# Patient Record
Sex: MALE | URBAN - METROPOLITAN AREA
[De-identification: names, ages, dates, MRNs, and addresses within clinical notes are randomized per-mention and may not be internally consistent; named-entity substitution may affect disease eponyms.]

---

## 2020-04-10 ENCOUNTER — EMERGENCY (EMERGENCY)
Facility: HOSPITAL | Age: 70
LOS: 1 days | Discharge: HOME | End: 2020-04-10
Attending: EMERGENCY MEDICINE
Payer: MEDICARE

## 2020-04-10 VITALS
TEMPERATURE: 100 F | HEART RATE: 115 BPM | OXYGEN SATURATION: 100 % | RESPIRATION RATE: 18 BRPM | SYSTOLIC BLOOD PRESSURE: 134 MMHG | DIASTOLIC BLOOD PRESSURE: 89 MMHG

## 2020-04-10 VITALS
DIASTOLIC BLOOD PRESSURE: 82 MMHG | HEART RATE: 81 BPM | RESPIRATION RATE: 18 BRPM | SYSTOLIC BLOOD PRESSURE: 122 MMHG | OXYGEN SATURATION: 99 % | WEIGHT: 201.94 LBS | HEIGHT: 69 IN | TEMPERATURE: 98 F

## 2020-04-10 LAB
ALBUMIN SERPL ELPH-MCNC: 4.2 G/DL — SIGNIFICANT CHANGE UP (ref 3.3–5)
ALP SERPL-CCNC: 119 U/L — SIGNIFICANT CHANGE UP (ref 40–120)
ALT FLD-CCNC: 70 U/L — HIGH (ref 10–45)
ANION GAP SERPL CALC-SCNC: 17 MMOL/L — SIGNIFICANT CHANGE UP (ref 5–17)
APPEARANCE UR: CLEAR — SIGNIFICANT CHANGE UP
APTT BLD: 35.1 SEC — SIGNIFICANT CHANGE UP (ref 27.5–36.3)
AST SERPL-CCNC: 78 U/L — HIGH (ref 10–40)
BACTERIA # UR AUTO: NEGATIVE — SIGNIFICANT CHANGE UP
BASOPHILS # BLD AUTO: 0.05 K/UL — SIGNIFICANT CHANGE UP (ref 0–0.2)
BASOPHILS NFR BLD AUTO: 0.3 % — SIGNIFICANT CHANGE UP (ref 0–2)
BILIRUB SERPL-MCNC: 0.6 MG/DL — SIGNIFICANT CHANGE UP (ref 0.2–1.2)
BILIRUB UR-MCNC: NEGATIVE — SIGNIFICANT CHANGE UP
BUN SERPL-MCNC: 36 MG/DL — HIGH (ref 7–23)
CALCIUM SERPL-MCNC: 9.4 MG/DL — SIGNIFICANT CHANGE UP (ref 8.4–10.5)
CHLORIDE SERPL-SCNC: 95 MMOL/L — LOW (ref 96–108)
CO2 SERPL-SCNC: 24 MMOL/L — SIGNIFICANT CHANGE UP (ref 22–31)
COLOR SPEC: SIGNIFICANT CHANGE UP
CREAT SERPL-MCNC: 1.5 MG/DL — HIGH (ref 0.5–1.3)
DIFF PNL FLD: ABNORMAL
EOSINOPHIL # BLD AUTO: 0.02 K/UL — SIGNIFICANT CHANGE UP (ref 0–0.5)
EOSINOPHIL NFR BLD AUTO: 0.1 % — SIGNIFICANT CHANGE UP (ref 0–6)
EPI CELLS # UR: 0 /HPF — SIGNIFICANT CHANGE UP
GLUCOSE SERPL-MCNC: 112 MG/DL — HIGH (ref 70–99)
GLUCOSE UR QL: NEGATIVE — SIGNIFICANT CHANGE UP
HCT VFR BLD CALC: 44.7 % — SIGNIFICANT CHANGE UP (ref 39–50)
HGB BLD-MCNC: 15.1 G/DL — SIGNIFICANT CHANGE UP (ref 13–17)
HYALINE CASTS # UR AUTO: 2 /LPF — SIGNIFICANT CHANGE UP (ref 0–2)
IMM GRANULOCYTES NFR BLD AUTO: 0.5 % — SIGNIFICANT CHANGE UP (ref 0–1.5)
INR BLD: 1.18 RATIO — HIGH (ref 0.88–1.16)
KETONES UR-MCNC: SIGNIFICANT CHANGE UP
LEUKOCYTE ESTERASE UR-ACNC: NEGATIVE — SIGNIFICANT CHANGE UP
LYMPHOCYTES # BLD AUTO: 1.13 K/UL — SIGNIFICANT CHANGE UP (ref 1–3.3)
LYMPHOCYTES # BLD AUTO: 5.7 % — LOW (ref 13–44)
MCHC RBC-ENTMCNC: 29 PG — SIGNIFICANT CHANGE UP (ref 27–34)
MCHC RBC-ENTMCNC: 33.8 GM/DL — SIGNIFICANT CHANGE UP (ref 32–36)
MCV RBC AUTO: 86 FL — SIGNIFICANT CHANGE UP (ref 80–100)
MONOCYTES # BLD AUTO: 0.98 K/UL — HIGH (ref 0–0.9)
MONOCYTES NFR BLD AUTO: 5 % — SIGNIFICANT CHANGE UP (ref 2–14)
NEUTROPHILS # BLD AUTO: 17.52 K/UL — HIGH (ref 1.8–7.4)
NEUTROPHILS NFR BLD AUTO: 88.4 % — HIGH (ref 43–77)
NITRITE UR-MCNC: NEGATIVE — SIGNIFICANT CHANGE UP
NRBC # BLD: 0 /100 WBCS — SIGNIFICANT CHANGE UP (ref 0–0)
PH UR: 6 — SIGNIFICANT CHANGE UP (ref 5–8)
PLATELET # BLD AUTO: 552 K/UL — HIGH (ref 150–400)
POTASSIUM SERPL-MCNC: 3.2 MMOL/L — LOW (ref 3.5–5.3)
POTASSIUM SERPL-SCNC: 3.2 MMOL/L — LOW (ref 3.5–5.3)
PROT SERPL-MCNC: 7.6 G/DL — SIGNIFICANT CHANGE UP (ref 6–8.3)
PROT UR-MCNC: ABNORMAL
PROTHROM AB SERPL-ACNC: 13.6 SEC — HIGH (ref 10–12.9)
RBC # BLD: 5.2 M/UL — SIGNIFICANT CHANGE UP (ref 4.2–5.8)
RBC # FLD: 13.5 % — SIGNIFICANT CHANGE UP (ref 10.3–14.5)
RBC CASTS # UR COMP ASSIST: 4 /HPF — SIGNIFICANT CHANGE UP (ref 0–4)
SODIUM SERPL-SCNC: 136 MMOL/L — SIGNIFICANT CHANGE UP (ref 135–145)
SP GR SPEC: 1.02 — SIGNIFICANT CHANGE UP (ref 1.01–1.02)
UROBILINOGEN FLD QL: NEGATIVE — SIGNIFICANT CHANGE UP
WBC # BLD: 19.79 K/UL — HIGH (ref 3.8–10.5)
WBC # FLD AUTO: 19.79 K/UL — HIGH (ref 3.8–10.5)
WBC UR QL: 1 /HPF — SIGNIFICANT CHANGE UP (ref 0–5)

## 2020-04-10 PROCEDURE — 93971 EXTREMITY STUDY: CPT | Mod: 26,RT

## 2020-04-10 PROCEDURE — 85730 THROMBOPLASTIN TIME PARTIAL: CPT

## 2020-04-10 PROCEDURE — 80053 COMPREHEN METABOLIC PANEL: CPT

## 2020-04-10 PROCEDURE — 73630 X-RAY EXAM OF FOOT: CPT

## 2020-04-10 PROCEDURE — 71045 X-RAY EXAM CHEST 1 VIEW: CPT

## 2020-04-10 PROCEDURE — 71045 X-RAY EXAM CHEST 1 VIEW: CPT | Mod: 26

## 2020-04-10 PROCEDURE — 81001 URINALYSIS AUTO W/SCOPE: CPT

## 2020-04-10 PROCEDURE — 85027 COMPLETE CBC AUTOMATED: CPT

## 2020-04-10 PROCEDURE — 85610 PROTHROMBIN TIME: CPT

## 2020-04-10 PROCEDURE — 73630 X-RAY EXAM OF FOOT: CPT | Mod: 26,RT

## 2020-04-10 PROCEDURE — 93971 EXTREMITY STUDY: CPT

## 2020-04-10 PROCEDURE — 99283 EMERGENCY DEPT VISIT LOW MDM: CPT | Mod: 25

## 2020-04-10 PROCEDURE — 99284 EMERGENCY DEPT VISIT MOD MDM: CPT

## 2020-04-10 RX ORDER — SODIUM CHLORIDE 9 MG/ML
500 INJECTION INTRAMUSCULAR; INTRAVENOUS; SUBCUTANEOUS ONCE
Refills: 0 | Status: COMPLETED | OUTPATIENT
Start: 2020-04-10 | End: 2020-04-10

## 2020-04-10 RX ORDER — POTASSIUM CHLORIDE 20 MEQ
40 PACKET (EA) ORAL ONCE
Refills: 0 | Status: COMPLETED | OUTPATIENT
Start: 2020-04-10 | End: 2020-04-10

## 2020-04-10 RX ORDER — ACETAMINOPHEN 500 MG
650 TABLET ORAL ONCE
Refills: 0 | Status: COMPLETED | OUTPATIENT
Start: 2020-04-10 | End: 2020-04-10

## 2020-04-10 RX ADMIN — SODIUM CHLORIDE 500 MILLILITER(S): 9 INJECTION INTRAMUSCULAR; INTRAVENOUS; SUBCUTANEOUS at 20:41

## 2020-04-10 RX ADMIN — Medication 40 MILLIEQUIVALENT(S): at 20:41

## 2020-04-10 RX ADMIN — Medication 650 MILLIGRAM(S): at 22:13

## 2020-04-10 NOTE — ED ADULT NURSE NOTE - OBJECTIVE STATEMENT
69y M w/ PMHx  HTN p/w R foot numbness that started this morning after walking down the stairs. Endorses he had severe pain in both feet so he called EMS, who said he had nml vitals. Endorses he still had pain, so pt went to  and had D-dimer elevated to 10, and found blood clots in leg. Currently only has numbness in the located in R toes. Denies pain at this time. Pt has full ROM in all extremities and able to bear weight on the affected foot.

## 2020-04-10 NOTE — ED PROVIDER NOTE - OBJECTIVE STATEMENT
69y M w/ PMHx of costochondritises (Diclofenac) and HTN p/w R foot numbness that started this morning after walking down the stairs. Endorses he had severe pain in both feet so he called EMS, who said he had nml vitals. Endorses he still had pain, so pt went to  and had D-dimer elevated to 10, and found blood clots in leg. Currently only has numbness in the located in R toes. Pt had CP a week ago and was Rx Diclofenac, although the CP has since resolved. Normally does not have any issues walking and does not have numbness in any other part of the body. Denies any injury or fall, CP, SOB. Has not had any previous clots or recent surgery. No cough, fever, N/V/D, back pain and no Hx of immobilization.     ID: [id number] 69y M PMHx HTN p/w R foot numbness that started this morning after walking down the stairs. Endorses he had severe pain in both feet so he called EMS, who said he had nml vitals. Endorses he still had pain, so pt went to  and had D-dimer elevated to 10, and was told to come to ED to eval for DVT. Currently only has numbness in the located in R toes. Pt had CP a week ago and was Rx Diclofenac, although the CP has since resolved. Normally does not have any issues walking and does not have numbness in any other part of the body. Denies any injury or fall, CP, SOB. Has not had any previous clots or recent surgery. No cough, fever, N/V/D, back pain and no Hx of immobilization.     ID: [id number]

## 2020-04-10 NOTE — ED ADULT NURSE NOTE - CHPI ED NUR SYMPTOMS NEG
right foot/no abrasion/no bruising/no difficulty bearing weight/no tingling/no fever/no deformity/no stiffness/no weakness

## 2020-04-10 NOTE — ED PROVIDER NOTE - CLINICAL SUMMARY MEDICAL DECISION MAKING FREE TEXT BOX
Leeroy Lazaro MD. 68 yo M presents for R leg numbness/pain since this AM. pt went to  and found to have elevated d-dimer and thus sent to ED here. pt neurologically intact except for subjective decreased sensation to his distal right toes vs left. low suspicion for acute neurologic pathology. will obtain labs, US to eval for DVT, and xray of foot. will reassess. Leeroy Lazaro MD. 70 yo M presents for R leg numbness/pain since this AM. pt went to  and found to have elevated d-dimer and thus sent to ED here. pt neurologically intact except for subjective decreased sensation to his distal right toes vs left. low suspicion for acute neurologic pathology. will obtain labs, US to eval for DVT, and xray of foot. will reassess.    ARGELIA Mcdaniel MD: Pt is a 70 y/o male with a PMH of costochondritis and HTN who p/w c/o R toe numbness (1-3) that started this morning after walking down the stairs. Endorses he had severe pain in both feet so he called EMS, who said he had nml vitals. Endorses he still had pain, so pt went to  and had D-dimer elevated (reportedly to 10), and found blood clots in leg. Currently only has numbness in the located in R toes. Pt had CP a week ago and was Rx Diclofenac, although the CP has since resolved. Normally does not have any issues walking and does not have numbness in any other part of the body. Denies any injury or fall, CP, SOB. Has not had any previous clots or recent surgery. No cough, fever, N/V/D, back pain and no Hx of immobilization. Leeroy Lazaro MD. 70 yo M presents for R leg numbness/pain since this AM. pt went to  and found to have elevated d-dimer and thus sent to ED here. pt neurologically intact except for subjective decreased sensation to his distal right toes vs left. low suspicion for acute neurologic pathology. will obtain labs, US to eval for DVT, and xray of foot. will reassess.    ARGELIA Mcdaniel MD: Pt is a 68 y/o male with a PMH of costochondritis and HTN who p/w c/o R toe numbness (1-3) that started this morning after walking down the stairs. Endorses he had severe pain in both feet so he called EMS, who evaluated him, performed VS, FS and felt pt did not need to go to ED. PT still had pain, so pt went to  who performed D-dimer which was elevated (reportedly to 10) and sent pt to ED for w/u for possible blood clot. Pt denies leg/calf pain/swelling. Pt denies: chest pain, SOB, cough, fevers, chills, pleuritic chest pain, abdominal pain, n/v/d, back pain, neck pain, HA, neck stiffness, focal numbness or weakness, visual changes, dizziness, lightheadedness, leg pain/swelling, recent travel, recent trauma, recent immobilization, dysuria, hematuria, rash. Pt treated last week with nsaids for costocondritis. Exam: A & O x 3, NAD, HEENT WNL and no facial asymmetry; lungs CTAB, cor +S1/S2, RRR, no murmurs; abdomen soft NTND; extremities with no edema; skin with no rashes, neuro exam non focal with no motor or sensory deficits. DP pulses intact to feet b/l, feet warm b/l, no cyanosis, cap refill <2 sec. Ddx includes, however, is not limited to: msk d/o, neuropathy. Less likely DVT given that pt has paresthesias/pain to the toes of his R foot with no inciting factors or risk factors for DVT. Plan: basic labs, LE doppler, reassess for possible outpt f/u

## 2020-04-10 NOTE — ED PROVIDER NOTE - PROGRESS NOTE DETAILS
Leeroy Lazaro MD. explained blood work and xrays and US findings to patient. advised to f/u with PMD regarding elevated WBC. strict return precautions provided. all questions answered. pt to be discharged. ARGELIA Mcdaniel MD: Pt appeared to have elevated WBC, Cr 1.5, elevated LFTs and we have no previous labs to compare to. Given elevated WBC, CXR and u/a performed without obvious abnormality or signs of infection. Here pt with low-grade temperature, also noted to be tachycardic. Tylenol given. Suspect viral syndrome or covid infection. Will continue to follow. No obvious signs/sx of serious bacterial infection at this time and pt appears to feel well and want to go home. Pt will require outpt f/u with PCP with strict return precautions.

## 2020-04-10 NOTE — ED PROVIDER NOTE - PHYSICAL EXAMINATION
GEN: NAD, awake, eyes open spontaneously  HEENT: NCAT, MMM, Trachea midline, normal conjunctiva, perrl  CHEST/LUNGS: Non-tachypneic, CTAB, bilateral breath sounds  CARDIAC: Non-tachycardic, normal perfusion, palpable DP/PT pulses bilat  ABDOMEN: Soft, NTND, No rebound/guarding  MSK: No edema, no gross deformity of extremities  SKIN: No rashes, no petechiae, no vesicles  NEURO: Subjective decrease sensation to distal aspect of R toes vs L, o/w motor funtion nml  PSYCH: Alert, appropriate, cooperative, with capacity and insight GEN: NAD, awake, eyes open spontaneously  HEENT: NCAT, MMM, Trachea midline, normal conjunctiva, perrl  CHEST/LUNGS: Non-tachypneic, CTAB, bilateral breath sounds  CARDIAC: Non-tachycardic, normal perfusion, palpable DP/PT pulses bilat  ABDOMEN: Soft, NTND, No rebound/guarding  MSK: No edema, no gross deformity of extremities; no tenerness to calves  SKIN: No rashes, no petechiae, no vesicles  NEURO: Subjective decrease sensation to distal aspect of R toes vs L, b/l motor funtion nml  PSYCH: Alert, appropriate, cooperative, with capacity and insight

## 2020-04-10 NOTE — ED PROVIDER NOTE - ATTENDING CONTRIBUTION TO CARE
I saw and evaluated patient with resident. I discussed H+P and MDM with resident. I agree with the statements made by the resident unless otherwise noted.    The care of this patient was in support of the [facility] countermeasures to Covid-19.

## 2020-04-10 NOTE — ED PROVIDER NOTE - NSFOLLOWUPINSTRUCTIONS_ED_ALL_ED_FT
Por favor tome acetaminofén sin receta médica. Use según las indicaciones y mamadou las advertencias de medicamentos.  Por favor tome Ibuprofeno sin receta médica. Use según las indicaciones y mamadou las advertencias de medicamentos.  Alejo un seguimiento con guevara médico de atención primaria dentro de 2-3 días con respecto a guevara recuento elevado de glóbulos blancos.  Por favor traiga tanya copia de juan resultados con usted.  Regrese al departamento de emergencias para empeorar juan síntomas.

## 2020-06-08 PROBLEM — I10 ESSENTIAL (PRIMARY) HYPERTENSION: Chronic | Status: ACTIVE | Noted: 2020-04-10

## 2020-06-08 PROBLEM — M94.0 CHONDROCOSTAL JUNCTION SYNDROME [TIETZE]: Chronic | Status: ACTIVE | Noted: 2020-04-10

## 2020-06-17 ENCOUNTER — INPATIENT (INPATIENT)
Facility: HOSPITAL | Age: 70
LOS: 4 days | Discharge: HOME | DRG: 246 | End: 2020-06-22
Attending: INTERNAL MEDICINE | Admitting: INTERNAL MEDICINE
Payer: MEDICARE

## 2020-06-17 ENCOUNTER — APPOINTMENT (OUTPATIENT)
Dept: HEART AND VASCULAR | Facility: CLINIC | Age: 70
End: 2020-06-17
Payer: MEDICARE

## 2020-06-17 VITALS
BODY MASS INDEX: 29.52 KG/M2 | DIASTOLIC BLOOD PRESSURE: 86 MMHG | HEART RATE: 95 BPM | SYSTOLIC BLOOD PRESSURE: 126 MMHG | TEMPERATURE: 98.1 F | WEIGHT: 197 LBS | OXYGEN SATURATION: 97 % | HEIGHT: 68.5 IN | RESPIRATION RATE: 14 BRPM

## 2020-06-17 VITALS
WEIGHT: 197.98 LBS | HEART RATE: 95 BPM | TEMPERATURE: 98 F | RESPIRATION RATE: 20 BRPM | HEIGHT: 69 IN | SYSTOLIC BLOOD PRESSURE: 130 MMHG | OXYGEN SATURATION: 98 % | DIASTOLIC BLOOD PRESSURE: 84 MMHG

## 2020-06-17 DIAGNOSIS — I21.4 NON-ST ELEVATION (NSTEMI) MYOCARDIAL INFARCTION: ICD-10-CM

## 2020-06-17 DIAGNOSIS — Z82.49 FAMILY HISTORY OF ISCHEMIC HEART DISEASE AND OTHER DISEASES OF THE CIRCULATORY SYSTEM: ICD-10-CM

## 2020-06-17 DIAGNOSIS — I10 ESSENTIAL (PRIMARY) HYPERTENSION: ICD-10-CM

## 2020-06-17 DIAGNOSIS — I50.9 HEART FAILURE, UNSPECIFIED: ICD-10-CM

## 2020-06-17 DIAGNOSIS — D72.829 ELEVATED WHITE BLOOD CELL COUNT, UNSPECIFIED: ICD-10-CM

## 2020-06-17 DIAGNOSIS — I51.3 INTRACARDIAC THROMBOSIS, NOT ELSEWHERE CLASSIFIED: ICD-10-CM

## 2020-06-17 LAB
ALBUMIN SERPL ELPH-MCNC: 4.1 G/DL — SIGNIFICANT CHANGE UP (ref 3.3–5)
ALP SERPL-CCNC: 67 U/L — SIGNIFICANT CHANGE UP (ref 40–120)
ALT FLD-CCNC: 24 U/L — SIGNIFICANT CHANGE UP (ref 10–45)
ANION GAP SERPL CALC-SCNC: 15 MMOL/L — SIGNIFICANT CHANGE UP (ref 5–17)
APTT BLD: 32.1 SEC — SIGNIFICANT CHANGE UP (ref 27.5–36.3)
AST SERPL-CCNC: 20 U/L — SIGNIFICANT CHANGE UP (ref 10–40)
BASOPHILS # BLD AUTO: 0.1 K/UL — SIGNIFICANT CHANGE UP (ref 0–0.2)
BASOPHILS NFR BLD AUTO: 0.6 % — SIGNIFICANT CHANGE UP (ref 0–2)
BILIRUB SERPL-MCNC: 0.3 MG/DL — SIGNIFICANT CHANGE UP (ref 0.2–1.2)
BUN SERPL-MCNC: 28 MG/DL — HIGH (ref 7–23)
CALCIUM SERPL-MCNC: 9.3 MG/DL — SIGNIFICANT CHANGE UP (ref 8.4–10.5)
CHLORIDE SERPL-SCNC: 102 MMOL/L — SIGNIFICANT CHANGE UP (ref 96–108)
CK MB CFR SERPL CALC: 5.9 NG/ML — SIGNIFICANT CHANGE UP (ref 0–6.7)
CK SERPL-CCNC: 101 U/L — SIGNIFICANT CHANGE UP (ref 30–200)
CO2 SERPL-SCNC: 24 MMOL/L — SIGNIFICANT CHANGE UP (ref 22–31)
CREAT SERPL-MCNC: 1.27 MG/DL — SIGNIFICANT CHANGE UP (ref 0.5–1.3)
EOSINOPHIL # BLD AUTO: 0.35 K/UL — SIGNIFICANT CHANGE UP (ref 0–0.5)
EOSINOPHIL NFR BLD AUTO: 2.3 % — SIGNIFICANT CHANGE UP (ref 0–6)
GLUCOSE SERPL-MCNC: 86 MG/DL — SIGNIFICANT CHANGE UP (ref 70–99)
HCT VFR BLD CALC: 44 % — SIGNIFICANT CHANGE UP (ref 39–50)
HGB BLD-MCNC: 14.6 G/DL — SIGNIFICANT CHANGE UP (ref 13–17)
IMM GRANULOCYTES NFR BLD AUTO: 0.6 % — SIGNIFICANT CHANGE UP (ref 0–1.5)
INR BLD: 1.07 — SIGNIFICANT CHANGE UP (ref 0.88–1.16)
LIDOCAIN IGE QN: 38 U/L — SIGNIFICANT CHANGE UP (ref 7–60)
LYMPHOCYTES # BLD AUTO: 16.7 % — SIGNIFICANT CHANGE UP (ref 13–44)
LYMPHOCYTES # BLD AUTO: 2.58 K/UL — SIGNIFICANT CHANGE UP (ref 1–3.3)
MAGNESIUM SERPL-MCNC: 2.1 MG/DL — SIGNIFICANT CHANGE UP (ref 1.6–2.6)
MCHC RBC-ENTMCNC: 29.6 PG — SIGNIFICANT CHANGE UP (ref 27–34)
MCHC RBC-ENTMCNC: 33.2 GM/DL — SIGNIFICANT CHANGE UP (ref 32–36)
MCV RBC AUTO: 89.1 FL — SIGNIFICANT CHANGE UP (ref 80–100)
MONOCYTES # BLD AUTO: 1.36 K/UL — HIGH (ref 0–0.9)
MONOCYTES NFR BLD AUTO: 8.8 % — SIGNIFICANT CHANGE UP (ref 2–14)
NEUTROPHILS # BLD AUTO: 10.95 K/UL — HIGH (ref 1.8–7.4)
NEUTROPHILS NFR BLD AUTO: 71 % — SIGNIFICANT CHANGE UP (ref 43–77)
NRBC # BLD: 0 /100 WBCS — SIGNIFICANT CHANGE UP (ref 0–0)
NT-PROBNP SERPL-SCNC: 2928 PG/ML — HIGH (ref 0–300)
PLATELET # BLD AUTO: 635 K/UL — HIGH (ref 150–400)
POTASSIUM SERPL-MCNC: 4.3 MMOL/L — SIGNIFICANT CHANGE UP (ref 3.5–5.3)
POTASSIUM SERPL-SCNC: 4.3 MMOL/L — SIGNIFICANT CHANGE UP (ref 3.5–5.3)
PROT SERPL-MCNC: 7.1 G/DL — SIGNIFICANT CHANGE UP (ref 6–8.3)
PROTHROM AB SERPL-ACNC: 12.2 SEC — SIGNIFICANT CHANGE UP (ref 10–12.9)
RBC # BLD: 4.94 M/UL — SIGNIFICANT CHANGE UP (ref 4.2–5.8)
RBC # FLD: 14.6 % — HIGH (ref 10.3–14.5)
SARS-COV-2 RNA SPEC QL NAA+PROBE: SIGNIFICANT CHANGE UP
SODIUM SERPL-SCNC: 141 MMOL/L — SIGNIFICANT CHANGE UP (ref 135–145)
TROPONIN T SERPL-MCNC: 0.06 NG/ML — CRITICAL HIGH (ref 0–0.01)
TROPONIN T SERPL-MCNC: 0.1 NG/ML — CRITICAL HIGH (ref 0–0.01)
WBC # BLD: 15.43 K/UL — HIGH (ref 3.8–10.5)
WBC # FLD AUTO: 15.43 K/UL — HIGH (ref 3.8–10.5)

## 2020-06-17 PROCEDURE — 99222 1ST HOSP IP/OBS MODERATE 55: CPT

## 2020-06-17 PROCEDURE — 93000 ELECTROCARDIOGRAM COMPLETE: CPT

## 2020-06-17 PROCEDURE — 99291 CRITICAL CARE FIRST HOUR: CPT

## 2020-06-17 PROCEDURE — 71045 X-RAY EXAM CHEST 1 VIEW: CPT | Mod: 26

## 2020-06-17 PROCEDURE — 99204 OFFICE O/P NEW MOD 45 MIN: CPT | Mod: 25

## 2020-06-17 PROCEDURE — 93306 TTE W/DOPPLER COMPLETE: CPT

## 2020-06-17 PROCEDURE — 93010 ELECTROCARDIOGRAM REPORT: CPT

## 2020-06-17 RX ORDER — ASPIRIN/CALCIUM CARB/MAGNESIUM 324 MG
81 TABLET ORAL DAILY
Refills: 0 | Status: DISCONTINUED | OUTPATIENT
Start: 2020-06-18 | End: 2020-06-22

## 2020-06-17 RX ORDER — METOPROLOL TARTRATE 50 MG
12.5 TABLET ORAL
Refills: 0 | Status: DISCONTINUED | OUTPATIENT
Start: 2020-06-17 | End: 2020-06-19

## 2020-06-17 RX ORDER — MONTELUKAST 4 MG/1
1 TABLET, CHEWABLE ORAL
Qty: 0 | Refills: 0 | DISCHARGE

## 2020-06-17 RX ORDER — CLOPIDOGREL BISULFATE 75 MG/1
75 TABLET, FILM COATED ORAL DAILY
Refills: 0 | Status: DISCONTINUED | OUTPATIENT
Start: 2020-06-18 | End: 2020-06-22

## 2020-06-17 RX ORDER — TRAZODONE HCL 50 MG
1 TABLET ORAL
Qty: 0 | Refills: 0 | DISCHARGE

## 2020-06-17 RX ORDER — LOSARTAN/HYDROCHLOROTHIAZIDE 100MG-25MG
1 TABLET ORAL
Qty: 0 | Refills: 0 | DISCHARGE

## 2020-06-17 RX ORDER — HEPARIN SODIUM 5000 [USP'U]/ML
5000 INJECTION INTRAVENOUS; SUBCUTANEOUS ONCE
Refills: 0 | Status: COMPLETED | OUTPATIENT
Start: 2020-06-17 | End: 2020-06-17

## 2020-06-17 RX ORDER — ATORVASTATIN CALCIUM 80 MG/1
80 TABLET, FILM COATED ORAL AT BEDTIME
Refills: 0 | Status: DISCONTINUED | OUTPATIENT
Start: 2020-06-17 | End: 2020-06-22

## 2020-06-17 RX ORDER — HEPARIN SODIUM 5000 [USP'U]/ML
5300 INJECTION INTRAVENOUS; SUBCUTANEOUS EVERY 6 HOURS
Refills: 0 | Status: DISCONTINUED | OUTPATIENT
Start: 2020-06-17 | End: 2020-06-18

## 2020-06-17 RX ORDER — CLOPIDOGREL BISULFATE 75 MG/1
600 TABLET, FILM COATED ORAL ONCE
Refills: 0 | Status: COMPLETED | OUTPATIENT
Start: 2020-06-17 | End: 2020-06-17

## 2020-06-17 RX ORDER — HEPARIN SODIUM 5000 [USP'U]/ML
INJECTION INTRAVENOUS; SUBCUTANEOUS
Qty: 25000 | Refills: 0 | Status: DISCONTINUED | OUTPATIENT
Start: 2020-06-17 | End: 2020-06-18

## 2020-06-17 RX ADMIN — CLOPIDOGREL BISULFATE 600 MILLIGRAM(S): 75 TABLET, FILM COATED ORAL at 19:51

## 2020-06-17 RX ADMIN — ATORVASTATIN CALCIUM 80 MILLIGRAM(S): 80 TABLET, FILM COATED ORAL at 23:53

## 2020-06-17 RX ADMIN — HEPARIN SODIUM 5000 UNIT(S): 5000 INJECTION INTRAVENOUS; SUBCUTANEOUS at 19:52

## 2020-06-17 RX ADMIN — Medication 12.5 MILLIGRAM(S): at 23:53

## 2020-06-17 RX ADMIN — HEPARIN SODIUM 1000 UNIT(S)/HR: 5000 INJECTION INTRAVENOUS; SUBCUTANEOUS at 19:52

## 2020-06-17 NOTE — H&P ADULT - PROBLEM SELECTOR PLAN 3
EF:35-40% by outpatient Echo 6/17/20.  --clinically euvolemic, continue ARB, strict I/Os and daily weights. EF:35-40% by outpatient Echo 6/17/20.  --clinically euvolemic, continue strict I/Os and daily weights.  --holding ARB in anticipation of cardiac catheterization.

## 2020-06-17 NOTE — H&P ADULT - NECK DETAILS
TRANSFER - IN REPORT:    Verbal report received from Nathan Dukes, RN(name) on Agustin Harada  being received from Emergency Room(unit) for routine progression of care      Report consisted of patients Situation, Background, Assessment and   Recommendations(SBAR). Information from the following report(s) SBAR, Kardex, ED Summary, MAR, Accordion, Recent Results and Cardiac Rhythm normal sinus rhythm was reviewed with the receiving nurse. Opportunity for questions and clarification was provided. Assessment completed upon patients arrival to unit and care assumed. 0200 Patient arrives to the unit. In no acute distress. Denies pain. Fiancee at bedside. Patient states more comfortable sitting at side of bed. Bed converted to recliner. 0720 Bedside and Verbal shift change report given to Estela Crespo (oncoming nurse) by Lam Priest (offgoing nurse). Report included the following information SBAR, Kardex, ED Summary, Intake/Output, MAR, Accordion, Recent Results and Cardiac Rhythm normal sinus rhythm. normal/supple/no JVD

## 2020-06-17 NOTE — ED PROVIDER NOTE - OBJECTIVE STATEMENT
69 y/o M pt with PMHx of HTN on Losartan, and no significant PSHx presents to ED from Dr. Woods's office (cardiology) for admission for abnormal EKG and echo. Pt complains of intermittent chest pain since April (about 3 months), but did not seek any medical care due to the covid pandemic. Pt's pain increases with activity, such as walking, has marked dyspnea on exertion when climbing a flight of stairs, and has associated dizziness and palpitations. Pt states he's seen a PCP over this crisis, was diagnosed with costochondritis, and was prescribed diclofenac for 2 weeks without relief. Pt also has gone to [facility] where he was found with elevated d-dimer and had a venous doppler study of his legs that were negative for DVT. Today, pt relates visiting Dr. Woods's office where he had an EKG done which was concerning for old resolving MI, and an echo done which was suspicious for anterior apical MI, large mobile apical thrombus, and LVEF reduced to 35-40%, along with mild pulmonary HTN and mild MR. Pt was then referred to ED for admission and possible cardiac cath. Pt reports having intermittent bilateral calf pain, but only when walking. Pt denies any current leg pain, leg swelling, fever, chills, cough, shortness of breath, abdominal pain, nausea, vomiting, diarrhea, or any other acute complaints. 71 y/o M pt with PMHx of HTN on Losartan, and no significant PSHx presents to ED from Dr. Woods's office (cardiology) for admission for abnormal EKG and outpatient cardiac echo. Pt complains of intermittent chest pain since April (about 3 months), but did not seek any medical care due to the covid 19 pandemic. Pt's pain increases with activity, such as walking, has marked dyspnea on exertion when climbing a flight of stairs, and has associated dizziness and palpitations. Pt states he's seen a PCP over this crisis, was diagnosed with costochondritis, and was prescribed diclofenac for 2 weeks without relief. Pt also has gone to [facility] where he was found with elevated d-dimer and had a venous doppler study of his legs that were negative for DVT. Today, pt relates visiting Dr. Woods's office where he had an EKG done which was concerning for acute resolving MI, and an echo which was suspicious for anterior apical MI with wall motion abnormalities, large mobile apical thrombus, and LVEF reduced to 35-40%, along with mild pulmonary HTN and mild MR. Pt was then referred to ED for admission and cardiac cath. Pt reports having intermittent bilateral calf pain, but only when walking. Pt denies any current leg pain, leg swelling, fever, chills, cough, shortness of breath, abdominal pain, nausea, vomiting, diarrhea, or any other acute complaints. No current CP in ED.

## 2020-06-17 NOTE — CHART NOTE - NSCHARTNOTEFT_GEN_A_CORE
Cardiology Fellow On-Call Chart Note    Cardiology fellow on-call paged at ~19:30 by Ed for admission to Cardiac Telemetry unit.    Hx reviewed as per ED documentation. EKG reviewed during admission sign out, and found to have pathologic Q waves and ALEX in anteroseptal precordium. Given lack of active CP, and history of CP for several months w/ an o/p TTE demonstrating anterior and apical hypo/akinesis (reported by ED) with mobile LV thrombus, this was deemed indicative of a non-acute MI w/o revascularization; and Cardiology fellow recommended initiation of Hep gtt (both fro ACS and LV thrombus), and ASA/Clopidogrel load. Still, case reviewed with interventionalist on call, who agreed that this was likely a non-acute MI that will need admission and further ischemic assessment; but given lack of cardiac sx and hemodynamic stability, will not need urgent revascularization unless sx or dynamic EKG changes present.    Case and plan discussed with admitting Cardiac Telemetry PA.

## 2020-06-17 NOTE — ED PROVIDER NOTE - CARE PLAN
Principal Discharge DX:	Unstable angina  Secondary Diagnosis:	NSTEMI (non-ST elevation myocardial infarction)

## 2020-06-17 NOTE — HISTORY OF PRESENT ILLNESS
[FreeTextEntry1] : EKG today shows NSR 90 bpm with QS pattern V1-V4 and coving ST segments c/w recent infarct possible LV aneurysm . No ectopy. Normal axis and intervals  and no LVH .  There are inverted T waves throughout the precordium and lead I and AVL suggestive of a mLAD stenosis \par \par he has not been taking aspirin\par \par Denies DM, never smoked\par Does not know his lipid status \par \par His mother  of acute MI age 57

## 2020-06-17 NOTE — ED ADULT TRIAGE NOTE - NS ED TRIAGE CLINICAL UPGRADE PROVIDER FT
1. Have you been to the ER, urgent care clinic since your last visit? Hospitalized since your last visit? 2 weeks s/p surgery [facility]    2. Have you seen or consulted any other health care providers outside of the [address] [place] [address] since your last visit? Include any pap smears or colon screening.    no Franki

## 2020-06-17 NOTE — ED ADULT TRIAGE NOTE - CHIEF COMPLAINT QUOTE
pt referred to ED by MD Woods for admission to CCU, pt had and abnormal EKG and echo done this morning concerned for acute MI, given 325 mg of aspirin.

## 2020-06-17 NOTE — ED ADULT NURSE NOTE - NSIMPLEMENTINTERV_GEN_ALL_ED
Implemented All Universal Safety Interventions:  [place] to call system. Call bell, personal items and telephone within reach. Instruct patient to call for assistance. Room bathroom lighting operational. Non-slip footwear when patient is off stretcher. Physically safe environment: no spills, clutter or unnecessary equipment. Stretcher in lowest position, wheels locked, appropriate side rails in place.

## 2020-06-17 NOTE — H&P ADULT - SPO2 (%)
In the past he admitted to intermittently taking too much carbadopa/levadopa or not taking it at all when he was at home. At his PCP appointment 4 days prior to admission he reported confusion about his med list and this was addressed at that appointment.   98

## 2020-06-17 NOTE — H&P ADULT - ASSESSMENT
70 yr old M with PMHx of HTN presents to [facility] 6/17/20 with c/o intermittent exertional midsternal chest pressure, associated with SOB/dizziness/palpitations x 3 months. Outpatient Echo suspicious for anterior apical MI, large mobile apical thrombus, and LVEF reduced to 35-40%, EKG NSR@94BPM with TWI lateral leads, pathologic Q waves and ALEX in anteroseptal precordium.    Patient currently CP free, admitted to cardiac telemetry for management of NSTEMI/apical thrombus while awaiting further ischemic evaluation.

## 2020-06-17 NOTE — H&P ADULT - HISTORY OF PRESENT ILLNESS
70 yr old M with PMHx of HTN who was referred to [facility] ED on 6/17/20 by cardiologist Dr. Woods for evaluation of abnormal EKG and Echo done this morning.  Patient reports he has been experiencing intermittent chest pain for ~3 months, however but did not seek any medical care due to the COVID pandemic.  Patient describes it as being exertional nonradiating midsternal chest pressure, 6/10 in severity, associated with SOB, dizziness and palpitations. Symptoms brought on by walking >2 city blocks or climbing >1 flight of stairs. Pt states he's seen a PCP via telehealth over this crisis, was diagnosed with costochondritis, and was prescribed diclofenac for 2 weeks without relief.   Pt also has gone to [facility] for evaluation of LE pain, found with elevated d-dimer and had a venous doppler study of his legs that were negative for DVT.   Patient was seen his cardiologist Dr. Woods's office today where he had an EKG done which was concerning for old resolving MI, and an echo done which was suspicious for anterior apical MI, large mobile apical thrombus, and LVEF reduced to 35-40%, along with mild pulmonary HTN and mild MR.  Patient denies any fever, chills, cough, abdominal pain, N/V, diaphoresis, orthopnea, LE edema, recent travel or sick contacts.  In ED, BP: 130/84, HR:95, RR:20, Temp: 98.2F, O2 sat: 98% RA.  EKG reviewed by CCU fellow revealed NSR with pathologic Q waves and ALEX in anteroseptal precodium. CXR unremarkable.  Labs notable for: WBC 15.43, Troponin T 0.06, BNP 2928, COVID PCR negative.  Case reviewed with interventionalist on call, given lack of active CP, outpatient Echo findings, indicative of a non-acute MI requiring further ischemic assessment.   Patient given ASA 325mg PO x 1 dose, Plavix 600mg PO x 1 dose and Heparin gtt with bolus for anticoagulation. 70 yr old M with PMHx of HTN who was referred to [facility] ED on 6/17/20 by cardiologist Dr. Woods for evaluation of abnormal EKG and Echo performed today. Patient reports he has been experiencing intermittent chest pain for ~3 months, however but did not seek any medical care due to the COVID pandemic.  Patient describes it as being exertional nonradiating midsternal chest pressure, 6/10 in severity, associated with SOB, dizziness and palpitations. Symptoms brought on by walking >2 city blocks or climbing >1 flight of stairs. Pt states he's seen a PCP via telehealth over this crisis, diagnosed with costochondritis and was prescribed diclofenac for 2 weeks without relief.   Pt also has gone to [facility] for evaluation of LE pain, found with elevated d-dimer and had a venous doppler study of his legs that were negative for DVT.   Patient was seen his cardiologist Dr. Woods's office today where he had an EKG done which was concerning for old resolving MI, and an echo done which was suspicious for anterior apical MI, large mobile apical thrombus, and LVEF reduced to 35-40%, along with mild pulmonary HTN and mild MR.  Patient denies any fever, chills, cough, abdominal pain, N/V, diaphoresis, orthopnea, LE edema, recent travel or sick contacts. Patient currently chest pain free.  In ED, BP: 130/84, HR:95, RR:20, Temp: 98.2F, O2 sat: 98% RA.  EKG reviewed by CCU fellow revealed NSR@94BPM with TWI lateral leads, pathologic Q waves and ALEX in anteroseptal precordium. CXR unremarkable.  Labs notable for: WBC 15.43, Troponin T 0.06, BNP 2928, COVID PCR negative.  Case reviewed with interventionalist on call, given lack of active CP, outpatient Echo findings, indicative of a non-acute MI requiring further ischemic assessment.   Patient given ASA 325mg PO x 1 dose, Plavix 600mg PO x 1 dose and Heparin gtt with bolus for anticoagulation.   Patient currently stable, admitted to cardiac telemetry for management of NSTEMI/apical thrombus while awaiting further ischemic evaluation. 70 yr old Lithuanian speaking M with PMHx of HTN who was referred to [facility] ED on 6/17/20 by cardiologist Dr. Woods for evaluation of abnormal EKG and Echo performed today. Patient reports he has been experiencing intermittent chest pain for ~3 months, however but did not seek any medical care due to the COVID pandemic.  Patient describes it as being exertional nonradiating midsternal chest pressure, 6/10 in severity, associated with SOB, dizziness and palpitations. Symptoms brought on by walking >2 city blocks or climbing >1 flight of stairs. Pt states he's seen a PCP via telehealth over this crisis, diagnosed with costochondritis and was prescribed diclofenac for 2 weeks without relief.   Pt also has gone to [facility] for evaluation of LE pain, found with elevated d-dimer and had a venous doppler study of his legs that were negative for DVT.   Patient was seen his cardiologist Dr. Woods's office today where he had an EKG done which was concerning for old resolving MI, and an echo done which was suspicious for anterior apical MI, large mobile apical thrombus, and LVEF reduced to 35-40%, along with mild pulmonary HTN and mild MR.  Patient denies any fever, chills, cough, abdominal pain, N/V, diaphoresis, orthopnea, LE edema, recent travel or sick contacts. Patient currently chest pain free.  In ED, BP: 130/84, HR:95, RR:20, Temp: 98.2F, O2 sat: 98% RA.  EKG reviewed by CCU fellow revealed NSR@94BPM with TWI lateral leads, pathologic Q waves and ALEX in anteroseptal precordium. CXR unremarkable.  Labs notable for: WBC 15.43, Troponin T 0.06, BNP 2928, COVID PCR negative.  Case reviewed with interventionalist on call, given lack of active CP, outpatient Echo findings, indicative of a non-acute MI requiring further ischemic assessment.   Patient given ASA 325mg PO x 1 dose, Plavix 600mg PO x 1 dose and Heparin gtt with bolus for anticoagulation.   Patient currently stable, admitted to cardiac telemetry for management of NSTEMI/apical thrombus while awaiting further ischemic evaluation.

## 2020-06-17 NOTE — H&P ADULT - ATTENDING COMMENTS
I have participated in the care of this patient. I have reviewed all pertinent clinical information, including history, physical exam, plan, and the PA's note and agree with the above.

## 2020-06-17 NOTE — ED PROVIDER NOTE - NS ED ATTENDING STATEMENT MOD
Attending Only Tazorac Pregnancy And Lactation Text: This medication is not safe during pregnancy. It is unknown if this medication is excreted in breast milk.

## 2020-06-17 NOTE — REVIEW OF SYSTEMS
[Dyspnea on exertion] : dyspnea during exertion [Feeling Fatigued] : feeling fatigued [Palpitations] : palpitations [Negative] : Heme/Lymph

## 2020-06-17 NOTE — REASON FOR VISIT
[Initial Evaluation] : an initial evaluation of [Abnormal ECG] : an abnormal ECG [Chest Pain] : chest pain [Family Member] : family member [Prior Myocardial Infarction] : a prior myocardial infarction [FreeTextEntry1] : 70 year old Richard male with hypertension  is   referred by a friend (also my patient)  for evaluation of chest pain. \par \par Back in April he was experiencing chest pain. He could not get in to see a doctor because of Covid 19 shutdown .  He called a physician who diagnosed  him with costochondritis and told him to stop aspirin and take diclofenac  which he did for two weeks.   He was exhausted at the time.  \par \par Now he has marked dyspnea on exertion climbing a flight of stairs . He has palpitations and intermittent dizziness. No chest pain, syncope, orthopnea. \par \par Went to a [facility] MD and was found to have elevated D-dimer and [facility] did venous duplex scan which was negative but there was no repeat of D-dimers nor CTA of chest performed to rule out pulmonary embolism . \par \par

## 2020-06-17 NOTE — PHYSICAL EXAM
[General Appearance - Well Developed] : well developed [Normal Appearance] : normal appearance [Well Groomed] : well groomed [General Appearance - Well Nourished] : well nourished [No Deformities] : no deformities [Normal Conjunctiva] : the conjunctiva exhibited no abnormalities [General Appearance - In No Acute Distress] : no acute distress [Eyelids - No Xanthelasma] : the eyelids demonstrated no xanthelasmas [No Oral Cyanosis] : no oral cyanosis [Normal Jugular Venous A Waves Present] : normal jugular venous A waves present [Normal Jugular Venous V Waves Present] : normal jugular venous V waves present [No Jugular Venous Knight A Waves] : no jugular venous knight A waves [Heart Rate And Rhythm] : heart rate and rhythm were normal [Heart Sounds] : normal S1 and S2 [Murmurs] : no murmurs present [Edema] : no peripheral edema present [Respiration, Rhythm And Depth] : normal respiratory rhythm and effort [Exaggerated Use Of Accessory Muscles For Inspiration] : no accessory muscle use [Bowel Sounds] : normal bowel sounds [Auscultation Breath Sounds / Voice Sounds] : lungs were clear to auscultation bilaterally [Abdomen Tenderness] : non-tender [Abdomen Soft] : soft [Abdomen Mass (___ Cm)] : no abdominal mass palpated [Gait - Sufficient For Exercise Testing] : the gait was sufficient for exercise testing [Nail Clubbing] : no clubbing of the fingernails [Abnormal Walk] : normal gait [Petechial Hemorrhages (___cm)] : no petechial hemorrhages [Cyanosis, Localized] : no localized cyanosis [Skin Turgor] : normal skin turgor [Skin Color & Pigmentation] : normal skin color and pigmentation [No Venous Stasis] : no venous stasis [] : no rash [Skin Lesions] : no skin lesions [No Xanthoma] : no  xanthoma was observed [No Skin Ulcers] : no skin ulcer [Oriented To Time, Place, And Person] : oriented to person, place, and time [Impaired Insight] : insight and judgment were intact [Mood] : the mood was normal [No Anxiety] : not feeling anxious [Affect] : the affect was normal [FreeTextEntry1] : protuberant abdomen

## 2020-06-17 NOTE — H&P ADULT - PROBLEM SELECTOR PLAN 5
WBC 15.43, afebrile, CXR unremarkable, COVID PCR negative.  --will F/U UA and blood cultures.    VTE PPx: on Heparin gtt

## 2020-06-17 NOTE — DISCUSSION/SUMMARY
[Stable] : stable [Acute Myocardial Infarction] : acute myocardial infarction [Outpatient Evaluation] : outpatient evaluation [Echocardiogram] : echocardiogram [Cardiac Catheterization] : cardiac catheterization [FreeTextEntry1] : Patient given 325mg aspirin \par \par echocardiogram verifies my suspicion of anterior apical MI. There is large mobile apical thrombus and LVEF reduced ~35-40%, mild pulm HTN, mild MR \par \par He is instructed to go to [facility] ER immediately for admission and iv heparin , cardiac catheterization (without lv gram)  \par \par Admit to CCU

## 2020-06-17 NOTE — ED PROVIDER NOTE - CONSTITUTIONAL, MLM
normal... Overweight appearing, awake, alert, oriented to person, place, time/situation and in no apparent distress. Overweight appearing, awake, alert, oriented and in no apparent distress.

## 2020-06-17 NOTE — H&P ADULT - PROBLEM SELECTOR PLAN 4
stable, continue Losartan 100mg PO daily and Metoprolol 12.5mg PO BID stable, started Metoprolol 12.5mg PO BID. Holding Losartan in anticipation of cardiac cath.

## 2020-06-17 NOTE — ED PROVIDER NOTE - MUSCULOSKELETAL, MLM
Spine appears normal, range of motion is not limited, no muscle or joint tenderness, no calf tenderness, no pedal edema. Spine appears normal, range of motion is not limited, no leg pain or swelling or calf TTP.

## 2020-06-17 NOTE — H&P ADULT - PROBLEM SELECTOR PLAN 1
currently chest pain free, EKG NSR@94BPM with TWI lateral leads, pathologic Q waves and ALEX in anteroseptal precordium. Initial Troponin T 0.06.  --outpatient Echo 6/17 suspicious for anterior apical MI, large mobile apical thrombus, and LVEF reduced to 35-40%.  --loaded with ASA 325mg PO x 1 dose/Plavix 600mg x 1 dose and started on Heparin gtt in ED.  --will continue ASA/Plavix, initiate BB/Statin.  --will F/U cardiac enzymes@9PM and 5AM.  --obtain official Echo and discuss further ischemic eval in AM.

## 2020-06-18 ENCOUNTER — TRANSCRIPTION ENCOUNTER (OUTPATIENT)
Age: 70
End: 2020-06-18

## 2020-06-18 LAB
A1C WITH ESTIMATED AVERAGE GLUCOSE RESULT: 5.8 % — HIGH (ref 4–5.6)
ANION GAP SERPL CALC-SCNC: 11 MMOL/L — SIGNIFICANT CHANGE UP (ref 5–17)
APTT BLD: 58.2 SEC — HIGH (ref 27.5–36.3)
BASOPHILS # BLD AUTO: 0.1 K/UL — SIGNIFICANT CHANGE UP (ref 0–0.2)
BASOPHILS NFR BLD AUTO: 0.8 % — SIGNIFICANT CHANGE UP (ref 0–2)
BUN SERPL-MCNC: 29 MG/DL — HIGH (ref 7–23)
CALCIUM SERPL-MCNC: 9.4 MG/DL — SIGNIFICANT CHANGE UP (ref 8.4–10.5)
CHLORIDE SERPL-SCNC: 103 MMOL/L — SIGNIFICANT CHANGE UP (ref 96–108)
CHOLEST SERPL-MCNC: 149 MG/DL — SIGNIFICANT CHANGE UP (ref 10–199)
CK MB CFR SERPL CALC: 6.8 NG/ML — HIGH (ref 0–6.7)
CK SERPL-CCNC: 104 U/L — SIGNIFICANT CHANGE UP (ref 30–200)
CO2 SERPL-SCNC: 27 MMOL/L — SIGNIFICANT CHANGE UP (ref 22–31)
CREAT SERPL-MCNC: 1.29 MG/DL — SIGNIFICANT CHANGE UP (ref 0.5–1.3)
EOSINOPHIL # BLD AUTO: 0.24 K/UL — SIGNIFICANT CHANGE UP (ref 0–0.5)
EOSINOPHIL NFR BLD AUTO: 1.9 % — SIGNIFICANT CHANGE UP (ref 0–6)
ESTIMATED AVERAGE GLUCOSE: 120 MG/DL — HIGH (ref 68–114)
GLUCOSE SERPL-MCNC: 104 MG/DL — HIGH (ref 70–99)
HCT VFR BLD CALC: 42.6 % — SIGNIFICANT CHANGE UP (ref 39–50)
HCT VFR BLD CALC: 44.2 % — SIGNIFICANT CHANGE UP (ref 39–50)
HCV AB S/CO SERPL IA: 0.1 S/CO — SIGNIFICANT CHANGE UP
HCV AB SERPL-IMP: SIGNIFICANT CHANGE UP
HDLC SERPL-MCNC: 31 MG/DL — LOW
HGB BLD-MCNC: 14 G/DL — SIGNIFICANT CHANGE UP (ref 13–17)
HGB BLD-MCNC: 14.3 G/DL — SIGNIFICANT CHANGE UP (ref 13–17)
IMM GRANULOCYTES NFR BLD AUTO: 0.6 % — SIGNIFICANT CHANGE UP (ref 0–1.5)
LIPID PNL WITH DIRECT LDL SERPL: 80 MG/DL — SIGNIFICANT CHANGE UP
LYMPHOCYTES # BLD AUTO: 16.4 % — SIGNIFICANT CHANGE UP (ref 13–44)
LYMPHOCYTES # BLD AUTO: 2.06 K/UL — SIGNIFICANT CHANGE UP (ref 1–3.3)
MAGNESIUM SERPL-MCNC: 2.2 MG/DL — SIGNIFICANT CHANGE UP (ref 1.6–2.6)
MCHC RBC-ENTMCNC: 28.7 PG — SIGNIFICANT CHANGE UP (ref 27–34)
MCHC RBC-ENTMCNC: 29.2 PG — SIGNIFICANT CHANGE UP (ref 27–34)
MCHC RBC-ENTMCNC: 32.4 GM/DL — SIGNIFICANT CHANGE UP (ref 32–36)
MCHC RBC-ENTMCNC: 32.9 GM/DL — SIGNIFICANT CHANGE UP (ref 32–36)
MCV RBC AUTO: 87.5 FL — SIGNIFICANT CHANGE UP (ref 80–100)
MCV RBC AUTO: 90.4 FL — SIGNIFICANT CHANGE UP (ref 80–100)
MONOCYTES # BLD AUTO: 0.93 K/UL — HIGH (ref 0–0.9)
MONOCYTES NFR BLD AUTO: 7.4 % — SIGNIFICANT CHANGE UP (ref 2–14)
NEUTROPHILS # BLD AUTO: 9.16 K/UL — HIGH (ref 1.8–7.4)
NEUTROPHILS NFR BLD AUTO: 72.9 % — SIGNIFICANT CHANGE UP (ref 43–77)
NRBC # BLD: 0 /100 WBCS — SIGNIFICANT CHANGE UP (ref 0–0)
NRBC # BLD: 0 /100 WBCS — SIGNIFICANT CHANGE UP (ref 0–0)
PLATELET # BLD AUTO: 602 K/UL — HIGH (ref 150–400)
PLATELET # BLD AUTO: 653 K/UL — HIGH (ref 150–400)
POTASSIUM SERPL-MCNC: 4.4 MMOL/L — SIGNIFICANT CHANGE UP (ref 3.5–5.3)
POTASSIUM SERPL-SCNC: 4.4 MMOL/L — SIGNIFICANT CHANGE UP (ref 3.5–5.3)
RBC # BLD: 4.87 M/UL — SIGNIFICANT CHANGE UP (ref 4.2–5.8)
RBC # BLD: 4.89 M/UL — SIGNIFICANT CHANGE UP (ref 4.2–5.8)
RBC # FLD: 14.9 % — HIGH (ref 10.3–14.5)
RBC # FLD: 15 % — HIGH (ref 10.3–14.5)
SODIUM SERPL-SCNC: 141 MMOL/L — SIGNIFICANT CHANGE UP (ref 135–145)
TOTAL CHOLESTEROL/HDL RATIO MEASUREMENT: 4.8 RATIO — SIGNIFICANT CHANGE UP (ref 3.4–9.6)
TRIGL SERPL-MCNC: 190 MG/DL — HIGH (ref 10–149)
TROPONIN T SERPL-MCNC: 0.15 NG/ML — CRITICAL HIGH (ref 0–0.01)
WBC # BLD: 12.17 K/UL — HIGH (ref 3.8–10.5)
WBC # BLD: 14.18 K/UL — HIGH (ref 3.8–10.5)
WBC # FLD AUTO: 12.17 K/UL — HIGH (ref 3.8–10.5)
WBC # FLD AUTO: 14.18 K/UL — HIGH (ref 3.8–10.5)

## 2020-06-18 PROCEDURE — 93454 CORONARY ARTERY ANGIO S&I: CPT | Mod: 26,59

## 2020-06-18 PROCEDURE — 92928 PRQ TCAT PLMT NTRAC ST 1 LES: CPT | Mod: RC

## 2020-06-18 PROCEDURE — 93306 TTE W/DOPPLER COMPLETE: CPT | Mod: 26

## 2020-06-18 PROCEDURE — 99232 SBSQ HOSP IP/OBS MODERATE 35: CPT

## 2020-06-18 RX ORDER — WARFARIN SODIUM 2.5 MG/1
5 TABLET ORAL ONCE
Refills: 0 | Status: COMPLETED | OUTPATIENT
Start: 2020-06-18 | End: 2020-06-18

## 2020-06-18 RX ORDER — HEPARIN SODIUM 5000 [USP'U]/ML
7000 INJECTION INTRAVENOUS; SUBCUTANEOUS EVERY 6 HOURS
Refills: 0 | Status: DISCONTINUED | OUTPATIENT
Start: 2020-06-18 | End: 2020-06-20

## 2020-06-18 RX ORDER — HEPARIN SODIUM 5000 [USP'U]/ML
3500 INJECTION INTRAVENOUS; SUBCUTANEOUS EVERY 6 HOURS
Refills: 0 | Status: DISCONTINUED | OUTPATIENT
Start: 2020-06-18 | End: 2020-06-20

## 2020-06-18 RX ORDER — HEPARIN SODIUM 5000 [USP'U]/ML
1000 INJECTION INTRAVENOUS; SUBCUTANEOUS
Qty: 25000 | Refills: 0 | Status: DISCONTINUED | OUTPATIENT
Start: 2020-06-19 | End: 2020-06-20

## 2020-06-18 RX ORDER — SODIUM CHLORIDE 9 MG/ML
500 INJECTION INTRAMUSCULAR; INTRAVENOUS; SUBCUTANEOUS
Refills: 0 | Status: DISCONTINUED | OUTPATIENT
Start: 2020-06-18 | End: 2020-06-22

## 2020-06-18 RX ORDER — SODIUM CHLORIDE 9 MG/ML
1000 INJECTION INTRAMUSCULAR; INTRAVENOUS; SUBCUTANEOUS
Refills: 0 | Status: DISCONTINUED | OUTPATIENT
Start: 2020-06-18 | End: 2020-06-18

## 2020-06-18 RX ADMIN — Medication 12.5 MILLIGRAM(S): at 18:26

## 2020-06-18 RX ADMIN — WARFARIN SODIUM 5 MILLIGRAM(S): 2.5 TABLET ORAL at 22:29

## 2020-06-18 RX ADMIN — ATORVASTATIN CALCIUM 80 MILLIGRAM(S): 80 TABLET, FILM COATED ORAL at 22:29

## 2020-06-18 RX ADMIN — Medication 81 MILLIGRAM(S): at 10:26

## 2020-06-18 RX ADMIN — Medication 12.5 MILLIGRAM(S): at 06:54

## 2020-06-18 RX ADMIN — SODIUM CHLORIDE 50 MILLILITER(S): 9 INJECTION INTRAMUSCULAR; INTRAVENOUS; SUBCUTANEOUS at 14:11

## 2020-06-18 RX ADMIN — CLOPIDOGREL BISULFATE 75 MILLIGRAM(S): 75 TABLET, FILM COATED ORAL at 10:26

## 2020-06-18 NOTE — PROGRESS NOTE ADULT - PROBLEM SELECTOR PLAN 3
EF:35-40% by outpatient Echo 6/17/20.  --clinically euvolemic, continue strict I/Os and daily weights.  --holding ARB in anticipation of cardiac catheterization. EF:35-40% by outpatient Echo 6/17/20.  -clinically euvolemic, continue strict I/Os and daily weights.  -holding ARB for cardiac catheterization.

## 2020-06-18 NOTE — PROVIDER CONTACT NOTE (CRITICAL VALUE NOTIFICATION) - SITUATION
patient complain of pain in his arm where cath is done. per PA another hour the band has to be on for and cannot be released

## 2020-06-18 NOTE — DISCHARGE NOTE PROVIDER - NSDCCPCAREPLAN_GEN_ALL_CORE_FT
PRINCIPAL DISCHARGE DIAGNOSIS  Diagnosis: NSTEMI (non-ST elevated myocardial infarction)  Assessment and Plan of Treatment: You were found to be having a mild heart attack for which you underwent a cardiac catheterization received a stent to your coronary artery.  You have been started on Aspirin 81mg daily and Plavix (Clopidogrel) 75mg daily. The procedure was done through the wrist. Please avoid any heavy lifting  (no more than 3 to 5 lbs) or strenuous activity for five days. If you develop any swelling, bleeding, hardening of the skin (hematoma formation), acute pain, numbness/tingling  in your arm  please contact your doctor immediately or call our 24/7 line: [phone].   NEVER MISS A DOSE OF ASPIRIN OR PLAVIX; IF YOU DO, YOU ARE AT RISK OF YOUR STENT CLOSING AND HAVING A HEART ATTACK. DO NOT STOP THESE TWO MEDICATIONS UNLESS INSTRUCTED TO DO SO BY YOUR CARDIOLOGIST.    PLEASE STOP ASPIRIN 81 MG DAILY AFTER 30 DAYS.  Please make a follow up appointment with your cardiologist within 1-2 weeks of your discharge. All of your prescriptions have been sent electronically to your pharmacy.        SECONDARY DISCHARGE DIAGNOSES  Diagnosis: Hyperlipidemia  Assessment and Plan of Treatment: Please continue atorvastatin 80 mg daily    Diagnosis: Hypertension  Assessment and Plan of Treatment: Please continue losartan/hydrochlorothiazide 100-25 mg daily. Please start metoprolol tartarte 12.5 mg twice daily.    Diagnosis: Left ventricular thrombus  Assessment and Plan of Treatment: You have a blot clot attached to one of the walls of your heart. You were started on the blood thinning medication coumadin (warfarin)    Diagnosis: NSTEMI (non-ST elevation myocardial infarction)  Assessment and Plan of Treatment: PRINCIPAL DISCHARGE DIAGNOSIS  Diagnosis: NSTEMI (non-ST elevated myocardial infarction)  Assessment and Plan of Treatment: You were found to be having a mild heart attack for which you underwent a cardiac catheterization received a stent to your coronary artery.  You have been started on Aspirin 81mg daily and Plavix (Clopidogrel) 75mg daily. The procedure was done through the wrist. Please avoid any heavy lifting  (no more than 3 to 5 lbs) or strenuous activity for five days. If you develop any swelling, bleeding, hardening of the skin (hematoma formation), acute pain, numbness/tingling  in your arm  please contact your doctor immediately or call our 24/7 line: [phone].   NEVER MISS A DOSE OF ASPIRIN OR PLAVIX; IF YOU DO, YOU ARE AT RISK OF YOUR STENT CLOSING AND HAVING A HEART ATTACK. DO NOT STOP THESE TWO MEDICATIONS UNLESS INSTRUCTED TO DO SO BY YOUR CARDIOLOGIST.    PLEASE STOP ASPIRIN 81 MG DAILY ON 6/25/20.   Please make a follow up appointment with your cardiologist Dr Woods on 7/6/2020 @10:40am. All of your prescriptions have been sent electronically to your pharmacy.        SECONDARY DISCHARGE DIAGNOSES  Diagnosis: New onset of congestive heart failure  Assessment and Plan of Treatment: You have a history of weakened heart muscle called heart failure  in the setting of your recent heart attack. Please take lasix 20 mg oral daily AS NEEDED if you experience 2 pounds of weight gain in 1 day OR 5 pounds of weight gain in 1 week.  You should weigh yourself daily and if you notice a weight gain of more than 2-3 pounds in 2 days or 5 pounds in 1 week contact your doctor immediately as you may be retaining water weight. In addition, restrict your salt intake to less than 2 grams a day. If you develop worsening shortness of breath, leg swelling, fatigue, chest pain, difficulty sleeping at night due to shortness of breath, contact your cardiologist immediately.  Please follow up with Dr Woods on 7/6/2020 @10:40 am      Diagnosis: Left ventricular thrombus  Assessment and Plan of Treatment: You have a blot clot attached to one of the walls of your heart. You were started on the blood thinning medication coumadin (warfarin) to break up the blood clot. Please continue coumadin 5 mg once at bedtime. You need to get the levels (INR) routinely checked. You have an appointment with your PCP Dr Seth Woods to check your level on 6/29/20 @ noon.    Diagnosis: Hyperlipidemia  Assessment and Plan of Treatment: Please continue atorvastatin 80 mg daily    Diagnosis: Hypertension  Assessment and Plan of Treatment: Please STOP losartan/hydrochlorothiazide 100-25 mg daily. Please start metoprolol succinate 25 mg daily and lisinopril 2.5 mg daily. PRINCIPAL DISCHARGE DIAGNOSIS  Diagnosis: NSTEMI (non-ST elevated myocardial infarction)  Assessment and Plan of Treatment: You were found to be having a mild heart attack for which you underwent a cardiac catheterization received a stent to your coronary artery.  You have been started on Aspirin 81mg daily and Plavix (Clopidogrel) 75mg daily. The procedure was done through the wrist.  If you develop any swelling, bleeding, hardening of the skin (hematoma formation), acute pain, numbness/tingling  in your arm  please contact your doctor immediately or call our 24/7 line: [phone].   NEVER MISS A DOSE OF ASPIRIN OR PLAVIX; IF YOU DO, YOU ARE AT RISK OF YOUR STENT CLOSING AND HAVING A HEART ATTACK. DO NOT STOP THESE TWO MEDICATIONS UNLESS INSTRUCTED TO DO SO BY YOUR CARDIOLOGIST.    PLEASE STOP ASPIRIN 81 MG DAILY ON 6/25/20.   Please make a follow up appointment with your cardiologist Dr Woods on 7/6/2020 @10:40am. All of your prescriptions have been sent electronically to your pharmacy.        SECONDARY DISCHARGE DIAGNOSES  Diagnosis: New onset of congestive heart failure  Assessment and Plan of Treatment: You have a history of weakened heart muscle called heart failure  in the setting of your recent heart attack. You should weigh yourself daily and if you notice a weight gain of more than 2 or more pounds in 1 day or 5 or more pounds in 1 week contact your doctor immediately as you may be retaining water weight. Please take Lasix (furosemide) 20 mg oral daily AS NEEDED if you experience 2 or more pounds of weight gain in 1 day OR 5 or more pounds of weight gain in 1 week.  In addition, restrict your salt intake to less than 2 grams a day. If you develop worsening shortness of breath, leg swelling, fatigue, chest pain, difficulty sleeping at night due to shortness of breath, contact your cardiologist immediately.  Please follow up with Dr Woods on 7/6/2020 @10:40 am      Diagnosis: Left ventricular thrombus  Assessment and Plan of Treatment: You have a blot clot attached to one of the walls of your heart. You were started on the blood thinning medication coumadin (warfarin) to break up the blood clot. Please continue coumadin 5 mg once at bedtime. You need to get the levels (INR) routinely checked. Please avoid eating leafy green vegetables since they are high in vitamin K which can alter your coumadin levels. You have an appointment with your PCP Dr Ann to check your INR level on 6/29/20 @ noon.    Diagnosis: Hyperlipidemia  Assessment and Plan of Treatment: Please continue atorvastatin 80 mg daily    Diagnosis: Hypertension  Assessment and Plan of Treatment: Please STOP losartan/hydrochlorothiazide 100-25 mg daily. Please start metoprolol succinate 25 mg daily and lisinopril 2.5 mg daily.

## 2020-06-18 NOTE — PROGRESS NOTE ADULT - ASSESSMENT
70 yr old M with PMHx of HTN presents to [facility] 6/17/20 with c/o intermittent exertional midsternal chest pressure, associated with SOB/dizziness/palpitations x 3 months. Outpatient Echo suspicious for anterior apical MI, large mobile apical thrombus, and LVEF reduced to 35-40%, EKG NSR@94BPM with TWI lateral leads, pathologic Q waves and ALEX in anteroseptal precordium. Patient currently CP free, admitted to cardiac telemetry for management of NSTEMI/apical thrombus with plan for cardiac catheterization today 6/18 with Dr Lazaro           Risks & benefits of procedure and alternative therapy have been explained to the patient including but not limited to: allergic reaction, bleeding w/possible need for blood transfusion, infection, renal and vascular compromise, limb damage, arrhythmia, stroke, vessel dissection/perforation, Myocardial infarction, emergent CABG. Informed consent obtained and in chart.

## 2020-06-18 NOTE — PROGRESS NOTE ADULT - PROBLEM SELECTOR PLAN 4
stable, started Metoprolol 12.5mg PO BID. Holding Losartan in anticipation of cardiac cath. stable, started Metoprolol 12.5mg PO BID.  - Holding Losartan for cardiac cath.

## 2020-06-18 NOTE — PROGRESS NOTE ADULT - SUBJECTIVE AND OBJECTIVE BOX
Interventional Cardiology PA Adult Progress Note    Subjective Assessment: pt seen and examined at bedside this am. Pt denies CP, SOB, N/V, dizziness.    ROS negative except for subjective and HPI   	  MEDICATIONS:  metoprolol tartrate 12.5 milliGRAM(s) Oral two times a day            atorvastatin 80 milliGRAM(s) Oral at bedtime    aspirin enteric coated 81 milliGRAM(s) Oral daily  clopidogrel Tablet 75 milliGRAM(s) Oral daily  heparin   Injectable 5300 Unit(s) IV Push every 6 hours PRN  heparin  Infusion.  Unit(s)/Hr IV Continuous <Continuous>  sodium chloride 0.9%. 1000 milliLiter(s) IV Continuous <Continuous>      	    [PHYSICAL EXAM:  TELEMETRY:  T(C): 36.4 (06-18-20 @ 10:24), Max: 37.2 (06-17-20 @ 22:36)  HR: 86 (06-18-20 @ 09:15) (78 - 95)  BP: 120/80 (06-18-20 @ 09:15) (109/71 - 142/92)  RR: 18 (06-18-20 @ 09:15) (18 - 20)  SpO2: 97% (06-18-20 @ 09:15) (95% - 98%)  Wt(kg): --  I&O's Summary    17 Jun 2020 07:01  -  18 Jun 2020 07:00  --------------------------------------------------------  IN: 0 mL / OUT: 150 mL / NET: -150 mL    18 Jun 2020 07:01  -  18 Jun 2020 11:15  --------------------------------------------------------  IN: 40 mL / OUT: 0 mL / NET: 40 mL      Height (cm): 175.3 (06-17 @ 22:36)  Weight (kg): 88.9 (06-17 @ 22:36)  BMI (kg/m2): 28.9 (06-17 @ 22:36)  BSA (m2): 2.05 (06-17 @ 22:36)  Larson:  Central/PICC/Mid Line:                                         Appearance: Normal	  HEENT:   Normal oral mucosa, PERRL, EOMI	  Neck: Supple,  - JVD  Cardiovascular: Normal S1 S2, No JVD, No murmurs  Respiratory: Lungs clear to auscultation, No Rales, Rhonchi, Wheezing	  Gastrointestinal:  Soft, Non-tender, + BS	  Skin: No rashes, No ecchymoses, No cyanosis  Extremities: Normal range of motion, No clubbing, cyanosis or edema  Vascular: Peripheral pulses palpable 2+ bilaterally  Neurologic: Non-focal  Psychiatry: A & O x 3, Mood & affect appropriate      	    ECG:  	  RADIOLOGY:   DIAGNOSTIC TESTING:  [ ] Echocardiogram:  [ ]  Catheterization:  [ ] Stress Test:    [ ] KEVIN  OTHER: 	    LABS:	 	  CARDIAC MARKERS:                                  14.3   12.17 )-----------( 653      ( 18 Jun 2020 06:27 )             44.2     06-18    141  |  103  |  29<H>  ----------------------------<  104<H>  4.4   |  27  |  1.29    Ca    9.4      18 Jun 2020 06:27  Mg     2.2     06-18    TPro  7.1  /  Alb  4.1  /  TBili  0.3  /  DBili  x   /  AST  20  /  ALT  24  /  AlkPhos  67  06-17    proBNP: Serum Pro-Brain Natriuretic Peptide: 2928 pg/mL (06-17 @ 17:08)    Lipid Profile:   HgA1c:   TSH:   PT/INR - ( 17 Jun 2020 17:07 )   PT: 12.2 sec;   INR: 1.07          PTT - ( 18 Jun 2020 02:06 )  PTT:58.2 sec    ASSESSMENT/PLAN: 	        DVT ppx:  Dispo: Interventional Cardiology PA Adult Progress Note    Subjective Assessment: pt seen and examined at bedside this am. Pt denies CP, SOB, N/V, dizziness. Pt consented using [facility]  [place] ID [id number]    ROS negative except for subjective and HPI   	  MEDICATIONS:  metoprolol tartrate 12.5 milliGRAM(s) Oral two times a day            atorvastatin 80 milliGRAM(s) Oral at bedtime    aspirin enteric coated 81 milliGRAM(s) Oral daily  clopidogrel Tablet 75 milliGRAM(s) Oral daily  heparin   Injectable 5300 Unit(s) IV Push every 6 hours PRN  heparin  Infusion.  Unit(s)/Hr IV Continuous <Continuous>  sodium chloride 0.9%. 1000 milliLiter(s) IV Continuous <Continuous>      	    [PHYSICAL EXAM:  TELEMETRY:  T(C): 36.4 (06-18-20 @ 10:24), Max: 37.2 (06-17-20 @ 22:36)  HR: 86 (06-18-20 @ 09:15) (78 - 95)  BP: 120/80 (06-18-20 @ 09:15) (109/71 - 142/92)  RR: 18 (06-18-20 @ 09:15) (18 - 20)  SpO2: 97% (06-18-20 @ 09:15) (95% - 98%)  Wt(kg): --  I&O's Summary    17 Jun 2020 07:01  -  18 Jun 2020 07:00  --------------------------------------------------------  IN: 0 mL / OUT: 150 mL / NET: -150 mL    18 Jun 2020 07:01  -  18 Jun 2020 11:15  --------------------------------------------------------  IN: 40 mL / OUT: 0 mL / NET: 40 mL      Height (cm): 175.3 (06-17 @ 22:36)  Weight (kg): 88.9 (06-17 @ 22:36)  BMI (kg/m2): 28.9 (06-17 @ 22:36)  BSA (m2): 2.05 (06-17 @ 22:36)  Larson:  Central/PICC/Mid Line:                                         Appearance: Normal	  HEENT:   Normal oral mucosa, PERRL, EOMI	  Neck: Supple,  - JVD  Cardiovascular: Normal S1 S2, No JVD, No murmurs  Respiratory: Lungs clear to auscultation, No Rales, Rhonchi, Wheezing	  Gastrointestinal:  Soft, Non-tender, + BS	  Skin: No rashes, No ecchymoses, No cyanosis  Extremities: Normal range of motion, No clubbing, cyanosis or edema  Vascular: Peripheral pulses palpable 2+ bilaterally  Neurologic: Non-focal  Psychiatry: A & O x 3, Mood & affect appropriate      	    ECG:  	  RADIOLOGY:   DIAGNOSTIC TESTING:  [ ] Echocardiogram:  [ ]  Catheterization:  [ ] Stress Test:    [ ] KEVIN  OTHER: 	    LABS:	 	  CARDIAC MARKERS:                                  14.3   12.17 )-----------( 653      ( 18 Jun 2020 06:27 )             44.2     06-18    141  |  103  |  29<H>  ----------------------------<  104<H>  4.4   |  27  |  1.29    Ca    9.4      18 Jun 2020 06:27  Mg     2.2     06-18    TPro  7.1  /  Alb  4.1  /  TBili  0.3  /  DBili  x   /  AST  20  /  ALT  24  /  AlkPhos  67  06-17    proBNP: Serum Pro-Brain Natriuretic Peptide: 2928 pg/mL (06-17 @ 17:08)    Lipid Profile:   HgA1c:   TSH:   PT/INR - ( 17 Jun 2020 17:07 )   PT: 12.2 sec;   INR: 1.07          PTT - ( 18 Jun 2020 02:06 )  PTT:58.2 sec    ASSESSMENT/PLAN: 	        DVT ppx:  Dispo:

## 2020-06-18 NOTE — DISCHARGE NOTE PROVIDER - PROVIDER TOKENS
PROVIDER:[TOKEN:[[id number]],FOLLOWUP:[1 week]] PROVIDER:[TOKEN:[[id number]],SCHEDULEDAPPT:[07/06/2020],SCHEDULEDAPPTTIME:[10:40 AM],ESTABLISHEDPATIENT:[T]],PROVIDER:[TOKEN:[[id number]],SCHEDULEDAPPT:[06/29/2020],SCHEDULEDAPPTTIME:[12:00 PM],ESTABLISHEDPATIENT:[T]]

## 2020-06-18 NOTE — PROGRESS NOTE ADULT - PROBLEM SELECTOR PLAN 2
f/u offical echo  -continue Heparin gtt for anticoagulation f/u official echo  -continue Heparin gtt for anticoagulation  -will likely need to be a/c with coumadin 5 mg post cath if thrombus confirmed  -will order PT/PTT/INR post cath

## 2020-06-18 NOTE — PROGRESS NOTE ADULT - PROBLEM SELECTOR PLAN 5
WBC 15.43, afebrile, CXR unremarkable, COVID PCR negative.  --will F/U UA and blood cultures.    VTE PPx: on Heparin gtt WBC 15.43 on admit, downtrended to 13 without L shift, afebrile, CXR unremarkable, COVID PCR negative.  -UA negative  -f/u blood cultures    VTE PPx: on Heparin gtt  Dispo: pending cath WBC 15.43 on admit, downtrended to 13 without L shift, afebrile, CXR unremarkable, COVID PCR negative.  -UA negative  -f/u blood cultures    VTE PPx: on Heparin gtt with plan to start coumadin tonight 6/18  Dispo: pending cath

## 2020-06-18 NOTE — DISCHARGE NOTE PROVIDER - HOSPITAL COURSE
INCOMPLETE         70 yr old Polish speaking M with PMHx of HTN who was referred to [facility] ED on 6/17/20 by cardiologist Dr. Woods for evaluation of abnormal EKG and Echo performed today. Patient reports he has been experiencing intermittent chest pain for ~3 months, however but did not seek any medical care due to the COVID pandemic. Patient describes it as being exertional nonradiating midsternal chest pressure, 6/10 in severity, associated with SOB, dizziness and palpitations. Symptoms brought on by walking >2 city blocks or climbing >1 flight of stairs. Pt states he's seen a PCP via telehealth over this crisis, diagnosed with costochondritis and was prescribed diclofenac for 2 weeks without relief. Pt also has gone to [facility] for evaluation of LE pain, found with elevated d-dimer and had a venous doppler study of his legs that were negative for DVT.  Patient was seen his cardiologist Dr. Woods's office today where he had an EKG done which was concerning for old resolving MI, and an echo done which was suspicious for anterior apical MI, large mobile apical thrombus, and LVEF reduced to 35-40%, along with mild pulmonary HTN and mild MR. Patient denies any fever, chills, cough, abdominal pain, N/V, diaphoresis, orthopnea, LE edema, recent travel or sick contacts. Patient currently chest pain free. In ED, BP: 130/84, HR:95, RR:20, Temp: 98.2F, O2 sat: 98% RA.  EKG reviewed by CCU fellow revealed NSR@94BPM with TWI lateral leads, pathologic Q waves and ALEX in anteroseptal precordium. CXR unremarkable. Labs notable for: WBC 15.43, Troponin T 0.06, BNP 2928, COVID PCR negative. Case reviewed with interventionalist on call, given lack of active CP, outpatient Echo findings, indicative of a non-acute MI requiring further ischemic assessment.  Patient given ASA 325mg PO x 1 dose, Plavix 600mg PO x 1 dose and Heparin gtt with bolus for anticoagulation.  Patient currently stable, admitted to cardiac telemetry for management of NSTEMI/apical thrombus. Pt s/p cardiac cath 6/18/20 with VENESSA to proxLAD (99% heavily Calicified), VENESSA to proxRCA (70%), VENESSA to midRCA (80%). distalLAD 50%, D2 ostial 60%, LCx OM1 40%, RCA dominant. Right radial access. Pt will continue with triple therapy (ASA, Plavix, coumadin) with AC x 1 month, then discontinue Aspirin. Echo 6/18 No significant valvular disease. RV size and function normal. Mild LVH, EF 30-35% with regional wall motion abnormalities. There is mid anteroseptal, mid inferoseptal, apical septal, apical lateral, apical inferior and apical anterior wall akinesis. There is a large 3.9 x 2 cm echogenic density adherent to the mid septal, apical septal and the apical wall. In the setting of regional wall motion this likely represents a thrombus. Apical portion of the thrombus appears mobile. No pericardial effusion. Pt was heparin to coumadin bridged inpatient with INR of _________ on day of discharge. 70 yr old [place] citizen/English speaking M with PMHx of HTN who was referred to [facility] ED on 6/17/20 by cardiologist Dr. Woods for evaluation of abnormal EKG and Echo performed today. Patient reports he has been experiencing intermittent chest pain for ~3 months, however but did not seek any medical care due to the COVID pandemic. Patient describes it as being exertional nonradiating midsternal chest pressure, 6/10 in severity, associated with SOB, dizziness and palpitations. Symptoms brought on by walking >2 city blocks or climbing >1 flight of stairs. Pt states he's seen a PCP via telehealth over this crisis, diagnosed with costochondritis and was prescribed diclofenac for 2 weeks without relief. Pt also has gone to [facility] for evaluation of LE pain, found with elevated d-dimer and had a venous doppler study of his legs that were negative for DVT.  Patient was seen his cardiologist Dr. Woods's office today where he had an EKG done which was concerning for old resolving MI, and an echo done which was suspicious for anterior apical MI, large mobile apical thrombus, and LVEF reduced to 35-40%, along with mild pulmonary HTN and mild MR. Patient denies any fever, chills, cough, abdominal pain, N/V, diaphoresis, orthopnea, LE edema, recent travel or sick contacts. Patient currently chest pain free. In ED, BP: 130/84, HR:95, RR:20, Temp: 98.2F, O2 sat: 98% RA.  EKG reviewed by CCU fellow revealed NSR@94BPM with TWI lateral leads, pathologic Q waves and ALEX in anteroseptal precordium. CXR unremarkable. Labs notable for: WBC 15.43, Troponin T 0.06, BNP 2928, COVID PCR negative. Case reviewed with interventionalist on call, given lack of active CP, outpatient Echo findings, indicative of a non-acute MI requiring further ischemic assessment.  Patient given ASA 325mg PO x 1 dose, Plavix 600mg PO x 1 dose and Heparin gtt with bolus for anticoagulation.  Patient currently stable, admitted to cardiac telemetry for management of NSTEMI/apical thrombus. Pt s/p cardiac cath 6/18/20 with VENESSA to proxLAD (99% heavily Calicified), VENESSA to proxRCA (70%), VENESSA to midRCA (80%). distalLAD 50%, D2 ostial 60%, LCx OM1 40%, RCA dominant. Right radial access. Pt will continue with triple therapy (ASA, Plavix, coumadin) with AC x 1 month, then discontinue Aspirin on 6/25/20 . Echo 6/18 No significant valvular disease. RV size and function normal. Mild LVH, EF 30-35% with regional wall motion abnormalities. There is mid anteroseptal, mid inferoseptal, apical septal, apical lateral, apical inferior and apical anterior wall akinesis. There is a large 3.9 x 2 cm echogenic density adherent to the mid septal, apical septal and the apical wall. In the setting of regional wall motion this likely represents a thrombus. Apical portion of the thrombus appears mobile. No pericardial effusion. Pt was heparin to coumadin bridged inpatient with INR of 2.3 on day of discharge.  Pt discharged on coumadin 5 mg, atorvastatin 80 mg daily, and lisinopril 2.5 mg daily, protonix 40 mg daily, and lasix 20 mg PO QD PRN for 2 lb weight gain in 1 day and 5 lb weight gain in 1 week. Home losartan-HCTZ discontinued. Pt also given script for cardiac rehabilitation. Pt deemed stable for discharge per Dr Medina and will follow up with Dr Seth Gerard for an INR check on 6/29/20 @12 pm and with Dr Woods on 7/6/20 @10:40am.

## 2020-06-18 NOTE — DISCHARGE NOTE PROVIDER - NSDCMRMEDTOKEN_GEN_ALL_CORE_FT
Aspirin Enteric Coated 81 mg oral delayed release tablet: 1 tab(s) orally once a day  losartan-hydrochlorothiazide 100 mg-25 mg oral tablet: 1 tab(s) orally once a day Aspirin Enteric Coated 81 mg oral delayed release tablet: 1 tab(s) orally once a day  atorvastatin 80 mg oral tablet: 1 tab(s) orally once a day (at bedtime)  cardiac rehabiltation : please bring to your cardiologist for a referral for cardiac rehabilitation   clopidogrel 75 mg oral tablet: 1 tab(s) orally once a day  Coumadin 5 mg oral tablet: 1 tab(s) orally once a day   furosemide 20 mg oral tablet: 1 tab(s) orally prn. PLEASE TAKE AS NEEDED, 1 TAB DAILY FOR WEIGHT GAIN OF 2 OR MORE POUNDS IN 1 DAY OR 5 OR MORE POUNDS IN 1 WEEK.   lisinopril 2.5 mg oral tablet: 1 tab(s) orally once a day  metoprolol succinate 25 mg oral tablet, extended release: 1 tab(s) orally once a day  pantoprazole 40 mg oral delayed release tablet: 1 tab(s) orally once a day (before a meal)

## 2020-06-18 NOTE — DISCHARGE NOTE PROVIDER - CARE PROVIDERS DIRECT ADDRESSES
,[email or web address]

## 2020-06-18 NOTE — PROGRESS NOTE ADULT - PROBLEM SELECTOR PLAN 1
currently chest pain free, EKG NSR@94BPM with TWI lateral leads, pathologic Q waves and ALEX in anteroseptal precordium.   -Troponin T 0.06->0.10->0.15  -chest pain free  -outpatient Echo 6/17 @Dr Woods suspicious for anterior apical MI, large mobile apical thrombus, and LVEF reduced to 35-40%.  - F/U repeat ECHO   -loaded with ASA 325mg PO x 1 dose/Plavix 600mg x 1 dose and started on Heparin gtt in ED.  -will continue ASA/Plavix  -initiated lopressor 12.5 mg BID and atorvastatin 80 mg   -precath/consented for cath w/ Dr Lazaro today 6/18 currently chest pain free, EKG NSR@94BPM with TWI lateral leads, pathologic Q waves and ALEX in anteroseptal precordium.   -Troponin T 0.06->0.10->0.15  -chest pain free  -outpatient Echo 6/17 @Dr Woods suspicious for anterior apical MI, large mobile apical thrombus, and LVEF reduced to 35-40%.  - F/U repeat ECHO   -loaded with ASA 325mg PO x 1 dose/Plavix 600mg x 1 dose and started on Heparin gtt in ED.  -will continue ASA/Plavix  -initiated lopressor 12.5 mg BID and atorvastatin 80 mg   -s/p cardiac cath 6/18 VENESSA to proxLAD (99% heavily Calicified), VENESSA to proxRCA (70%), VENESSA to midRCA (80%). distalLAD 50%, D2 ostial 60%, LCx OM1 40%, RCA dominant. R radial access  -Triple therapy (ASA, Plavix, coumadin) with AC x 1 month, then discontinue Aspirin.  can start coumadin tonight

## 2020-06-18 NOTE — DISCHARGE NOTE PROVIDER - CARE PROVIDER_API CALL
Tyler Woods  CARDIOVASCULAR DISEASE  [address] [place] [address]  Phone: [phone]  Fax: [phone]  Follow Up Time: 1 week Tyler Woods  CARDIOVASCULAR DISEASE  [address] [place] [id number]  Phone: [phone]  Fax: [phone]  Established Patient  Scheduled Appointment: 07/06/2020 10:40 AM    Moustapha Ann  INTERNAL MEDICINE  [address] [place] [address]  Phone: [phone]  Fax: [phone]  Established Patient  Scheduled Appointment: 06/29/2020 12:00 PM

## 2020-06-19 LAB
ANION GAP SERPL CALC-SCNC: 11 MMOL/L — SIGNIFICANT CHANGE UP (ref 5–17)
APTT BLD: 48.2 SEC — HIGH (ref 27.5–36.3)
APTT BLD: 55.1 SEC — HIGH (ref 27.5–36.3)
APTT BLD: 61.3 SEC — HIGH (ref 27.5–36.3)
BUN SERPL-MCNC: 22 MG/DL — SIGNIFICANT CHANGE UP (ref 7–23)
CALCIUM SERPL-MCNC: 8.8 MG/DL — SIGNIFICANT CHANGE UP (ref 8.4–10.5)
CHLORIDE SERPL-SCNC: 103 MMOL/L — SIGNIFICANT CHANGE UP (ref 96–108)
CO2 SERPL-SCNC: 26 MMOL/L — SIGNIFICANT CHANGE UP (ref 22–31)
CREAT SERPL-MCNC: 1.15 MG/DL — SIGNIFICANT CHANGE UP (ref 0.5–1.3)
GLUCOSE SERPL-MCNC: 107 MG/DL — HIGH (ref 70–99)
HCT VFR BLD CALC: 43.8 % — SIGNIFICANT CHANGE UP (ref 39–50)
HGB BLD-MCNC: 14 G/DL — SIGNIFICANT CHANGE UP (ref 13–17)
INR BLD: 1.09 — SIGNIFICANT CHANGE UP (ref 0.88–1.16)
INR BLD: 1.12 — SIGNIFICANT CHANGE UP (ref 0.88–1.16)
MAGNESIUM SERPL-MCNC: 2 MG/DL — SIGNIFICANT CHANGE UP (ref 1.6–2.6)
MCHC RBC-ENTMCNC: 28.7 PG — SIGNIFICANT CHANGE UP (ref 27–34)
MCHC RBC-ENTMCNC: 32 GM/DL — SIGNIFICANT CHANGE UP (ref 32–36)
MCV RBC AUTO: 89.9 FL — SIGNIFICANT CHANGE UP (ref 80–100)
NRBC # BLD: 0 /100 WBCS — SIGNIFICANT CHANGE UP (ref 0–0)
PLATELET # BLD AUTO: 697 K/UL — HIGH (ref 150–400)
POTASSIUM SERPL-MCNC: 4.2 MMOL/L — SIGNIFICANT CHANGE UP (ref 3.5–5.3)
POTASSIUM SERPL-SCNC: 4.2 MMOL/L — SIGNIFICANT CHANGE UP (ref 3.5–5.3)
PROTHROM AB SERPL-ACNC: 12.5 SEC — SIGNIFICANT CHANGE UP (ref 10–12.9)
PROTHROM AB SERPL-ACNC: 12.8 SEC — SIGNIFICANT CHANGE UP (ref 10–12.9)
RBC # BLD: 4.87 M/UL — SIGNIFICANT CHANGE UP (ref 4.2–5.8)
RBC # FLD: 14.8 % — HIGH (ref 10.3–14.5)
SODIUM SERPL-SCNC: 140 MMOL/L — SIGNIFICANT CHANGE UP (ref 135–145)
WBC # BLD: 13.57 K/UL — HIGH (ref 3.8–10.5)
WBC # FLD AUTO: 13.57 K/UL — HIGH (ref 3.8–10.5)

## 2020-06-19 PROCEDURE — 99232 SBSQ HOSP IP/OBS MODERATE 35: CPT

## 2020-06-19 RX ORDER — WARFARIN SODIUM 2.5 MG/1
5 TABLET ORAL ONCE
Refills: 0 | Status: COMPLETED | OUTPATIENT
Start: 2020-06-19 | End: 2020-06-19

## 2020-06-19 RX ORDER — METOPROLOL TARTRATE 50 MG
25 TABLET ORAL DAILY
Refills: 0 | Status: DISCONTINUED | OUTPATIENT
Start: 2020-06-20 | End: 2020-06-22

## 2020-06-19 RX ORDER — METOPROLOL TARTRATE 50 MG
12.5 TABLET ORAL ONCE
Refills: 0 | Status: COMPLETED | OUTPATIENT
Start: 2020-06-19 | End: 2020-06-19

## 2020-06-19 RX ADMIN — ATORVASTATIN CALCIUM 80 MILLIGRAM(S): 80 TABLET, FILM COATED ORAL at 21:10

## 2020-06-19 RX ADMIN — Medication 12.5 MILLIGRAM(S): at 18:19

## 2020-06-19 RX ADMIN — Medication 81 MILLIGRAM(S): at 11:47

## 2020-06-19 RX ADMIN — HEPARIN SODIUM 1000 UNIT(S)/HR: 5000 INJECTION INTRAVENOUS; SUBCUTANEOUS at 00:01

## 2020-06-19 RX ADMIN — WARFARIN SODIUM 5 MILLIGRAM(S): 2.5 TABLET ORAL at 21:10

## 2020-06-19 RX ADMIN — HEPARIN SODIUM 3500 UNIT(S): 5000 INJECTION INTRAVENOUS; SUBCUTANEOUS at 07:00

## 2020-06-19 RX ADMIN — Medication 12.5 MILLIGRAM(S): at 07:47

## 2020-06-19 RX ADMIN — CLOPIDOGREL BISULFATE 75 MILLIGRAM(S): 75 TABLET, FILM COATED ORAL at 11:48

## 2020-06-19 RX ADMIN — HEPARIN SODIUM 3500 UNIT(S): 5000 INJECTION INTRAVENOUS; SUBCUTANEOUS at 15:32

## 2020-06-19 NOTE — PROGRESS NOTE ADULT - ASSESSMENT
70 yr old M with PMHx of HTN presents to [facility] 6/17/20 with c/o intermittent exertional midsternal chest pressure, associated with SOB/dizziness/palpitations x 3 months. Outpatient Echo suspicious for anterior apical MI, large mobile apical thrombus, and LVEF reduced to 35-40%, EKG NSR@94BPM with TWI lateral leads, pathologic Q waves and ALEX in anteroseptal precordium. Pt admitted to cardiac telemetry for management of NSTEMI/apical thrombus. Patient now s/p cardiac cath on 6/18/20 VENESSA to proxLAD (99% heavily Calicified), VENESSA to proxRCA (70%), VENESSA to midRCA (80%). Patient had echo revealing large 3.9 x 2 cm echogenic density consistent with thrombus in the setting of wall motion abnormalities, EF 30-35%

## 2020-06-19 NOTE — PROGRESS NOTE ADULT - PROBLEM SELECTOR PLAN 4
stable, started Metoprolol 12.5mg PO BID.  - Holding Losartan for cardiac cath. - blood pressures on lower side, 90s-100s  - Lopressor 12.5mg BID transitioned to Toprol XL 25mg in AM  - Holding home losartan/HCTZ 100/25- ADD Losartan as blood pressure tolerates

## 2020-06-19 NOTE — PROGRESS NOTE ADULT - PROBLEM SELECTOR PLAN 1
currently chest pain free, EKG NSR@94BPM with TWI lateral leads, pathologic Q waves and ALEX in anteroseptal precordium.   -Troponin T 0.06->0.10->0.15  -Echos as below  -loaded with ASA 325mg PO x 1 dose/Plavix 600mg x 1 dose and started on Heparin gtt in ED.  -will continue ASA/Plavix  -initiated lopressor 12.5 mg BID and atorvastatin 80 mg   -s/p cardiac cath 6/18 VENESSA to proxLAD (99% heavily Calicified), VENESSA to proxRCA (70%), VENESSA to midRCA (80%). distalLAD 50%, D2 ostial 60%, LCx OM1 40%, RCA dominant. R radial access  -Triple therapy (ASA, Plavix, coumadin) with AC x 1 Week + ensure INR >2.5, then discontinue Aspirin.  -INR 1.09 Continue Coumadin 5mg, until therapeutic currently chest pain free, EKG NSR@94BPM with TWI lateral leads, pathologic Q waves and ALEX in anteroseptal precordium.   -Troponin T 0.06->0.10->0.15  -Echos as below  -loaded with ASA 325mg PO x 1 dose/Plavix 600mg x 1 dose and started on Heparin gtt in ED.  -will continue ASA/Plavix  -initiated lopressor 12.5 mg BID and atorvastatin 80 mg   -s/p cardiac cath 6/18 VENESSA to proxLAD (99% heavily Calicified), VENESSA to proxRCA (70%), VENESSA to midRCA (80%). distalLAD 50%, D2 ostial 60%, LCx OM1 40%, RCA dominant. R radial access  -Triple therapy (ASA, Plavix, coumadin) with AC x 1 Week + ensure INR >2.5, then discontinue Aspirin.  -continue Heparin, INR 1.09 Continue Coumadin 5mg, until therapeutic currently chest pain free, EKG NSR@94BPM with TWI lateral leads, pathologic Q waves and ALEX in anteroseptal precordium.   -Troponin T 0.06->0.10->0.15  -Echos as below  -loaded with ASA 325mg PO x 1 dose/Plavix 600mg x 1 dose and started on Heparin gtt in ED.   atorvastatin 80 mg   -s/p cardiac cath 6/18 VENESSA to proxLAD (99% heavily Calicified), VENESSA to proxRCA (70%), VENESSA to midRCA (80%). distalLAD 50%, D2 ostial 60%, LCx OM1 40%, RCA dominant. R radial access  -Triple therapy (Aspirin 81mg, Plavix 75mg, and Coumadin)x 1 Week + ensure INR >2.5, then discontinue Aspirin.  -continue Heparin, INR 1.09 Continue Coumadin 5mg, until therapeutic  -continue Atorvastatin 80mg daily

## 2020-06-19 NOTE — PROGRESS NOTE ADULT - SUBJECTIVE AND OBJECTIVE BOX
Interventional Cardiology PA Adult Progress Note    Subjective Assessment: Patient seen and examined at bedside. right Wrist site stable, +2 pulses. Feels great denies chest pain, shortness of breath. Patient endorsing her would like to go home. Discussed need to wait for INR to be therapeutic prior to leaving.     	  MEDICATIONS:  metoprolol tartrate 12.5 milliGRAM(s) Oral two times a day  atorvastatin 80 milliGRAM(s) Oral at bedtime  aspirin enteric coated 81 milliGRAM(s) Oral daily  clopidogrel Tablet 75 milliGRAM(s) Oral daily  heparin   Injectable 7000 Unit(s) IV Push every 6 hours PRN  heparin   Injectable 3500 Unit(s) IV Push every 6 hours PRN  heparin  Infusion. 1000 Unit(s)/Hr IV Continuous <Continuous>  sodium chloride 0.9%. 500 milliLiter(s) IV Continuous <Continuous>  [PHYSICAL EXAM:  TELEMETRY:  T(C): 36.7 (06-19-20 @ 10:01), Max: 37 (06-18-20 @ 17:51)  HR: 88 (06-19-20 @ 09:33) (82 - 100)  BP: 93/74 (06-19-20 @ 09:33) (93/74 - 134/89)  RR: 18 (06-19-20 @ 09:33) (17 - 18)  SpO2: 95% (06-19-20 @ 09:33) (94% - 98%)  Wt(kg): --  I&O's Summary    18 Jun 2020 07:01  -  19 Jun 2020 07:00  --------------------------------------------------------  IN: 520 mL / OUT: 800 mL / NET: -280 mL    19 Jun 2020 07:01  -  19 Jun 2020 12:27  --------------------------------------------------------  IN: 10 mL / OUT: 200 mL / NET: -190 mL       Appearance: Normal	  HEENT:   Normal oral mucosa, PERRL, EOMI	  Cardiovascular: Normal S1 S2, No murmurs,   Respiratory: Lungs clear to auscultation bilaterally, respirations non-labor  Skin: No rashes, No ecchymoses, No cyanosis  Extremities: Normal range of motion, No clubbing, cyanosis No pedal edema  Neurologic: Non-focal  Psychiatry: A & O x 3, Mood & affect appropriate                          14.0   13.57 )-----------( 697      ( 19 Jun 2020 06:37 )             43.8     06-19    140  |  103  |  22  ----------------------------<  107<H>  4.2   |  26  |  1.15    Ca    8.8      19 Jun 2020 06:37  Mg     2.0     06-19    TPro  7.1  /  Alb  4.1  /  TBili  0.3  /  DBili  x   /  AST  20  /  ALT  24  /  AlkPhos  67  06-17    proBNP:   Lipid Profile:   HgA1c:   TSH:   PT/INR - ( 19 Jun 2020 06:37 )   PT: 12.5 sec;   INR: 1.09          PTT - ( 19 Jun 2020 06:37 )  PTT:48.2 sec

## 2020-06-19 NOTE — PROGRESS NOTE ADULT - PROBLEM SELECTOR PLAN 5
WBC 15.43 on admit, downtrended to 13 without L shift, afebrile, CXR unremarkable, COVID PCR negative.  -UA negative  -f/u blood cultures    VTE PPx: on Heparin gtt with plan to start coumadin tonight 6/18  Dispo: pending cath WBC 15.43 on admit, downtrended to 13 without L shift, afebrile, CXR unremarkable, COVID PCR negative.  -UA negative  -f/u blood cultures    VTE PPx: heparin to coumadin bridge  Dispo: Pending therapeutic INR

## 2020-06-19 NOTE — PROGRESS NOTE ADULT - PROBLEM SELECTOR PLAN 2
f/u official echo  -continue Heparin gtt for anticoagulation  -will likely need to be a/c with coumadin 5 mg post cath if thrombus confirmed  -will order PT/PTT/INR post cath - Echo on 6/18/20 revealed. There is a large 3.9 x 2 cm echogenic density adherent to the mid septal, apical septal and the apical wall. In the setting of regional wall motion this likely represents a thrombus. Apical portion of the thrombus appears mobile.  -continue heparin to Coumadin bridge, Continue Coumadin 5mg daily untill INR >2.5  -continue to monitor

## 2020-06-19 NOTE — PROGRESS NOTE ADULT - PROBLEM SELECTOR PLAN 3
- Echo on 6/18/20 revealed No significant valvular disease There is mild concentric left ventricular hypertrophy. Left ventricular systolic function is moderately-to-severely reduced with a calculated ejection fraction of 30-35% with regional wall motion abnormalities. There is mid anteroseptal, mid inferoseptal, apical septal, apical lateral, apical inferior and apical anterior wall akinesis. There is a large 3.9 x 2 cm echogenic density adherent to the mid septal, apical septal and the apical wall. In the setting of regional wall motion this likely represents a thrombus. Apical portion of the thrombus appears mobile.  -clinically euvolemic, continue strict I/Os and daily weights.  -holding ARB for cardiac catheterization.  -transition metoprolol tartrate 12.5mg BID to Toprol XL 25mg - Echo on 6/18/20 revealed No significant valvular disease There is mild concentric left ventricular hypertrophy. Left ventricular systolic function is moderately-to-severely reduced with a calculated ejection fraction of 30-35% with regional wall motion abnormalities. There is mid anteroseptal, mid inferoseptal, apical septal, apical lateral, apical inferior and apical anterior wall akinesis. There is a large 3.9 x 2 cm echogenic density adherent to the mid septal, apical septal and the apical wall. In the setting of regional wall motion this likely represents a thrombus. Apical portion of the thrombus appears mobile.  -clinically euvolemic, continue strict I/Os and daily weights.  -holding ARB for cardiac catheterization.  -transition metoprolol tartrate 12.5mg BID to Toprol XL 25mg  -Holding ACE/ARB bp 90s-100s/70s

## 2020-06-20 LAB
ANION GAP SERPL CALC-SCNC: 12 MMOL/L — SIGNIFICANT CHANGE UP (ref 5–17)
APTT BLD: 45.9 SEC — HIGH (ref 27.5–36.3)
APTT BLD: 49.9 SEC — HIGH (ref 27.5–36.3)
APTT BLD: 76.6 SEC — HIGH (ref 27.5–36.3)
BUN SERPL-MCNC: 21 MG/DL — SIGNIFICANT CHANGE UP (ref 7–23)
CALCIUM SERPL-MCNC: 9 MG/DL — SIGNIFICANT CHANGE UP (ref 8.4–10.5)
CHLORIDE SERPL-SCNC: 104 MMOL/L — SIGNIFICANT CHANGE UP (ref 96–108)
CO2 SERPL-SCNC: 23 MMOL/L — SIGNIFICANT CHANGE UP (ref 22–31)
CREAT SERPL-MCNC: 1.16 MG/DL — SIGNIFICANT CHANGE UP (ref 0.5–1.3)
GLUCOSE SERPL-MCNC: 104 MG/DL — HIGH (ref 70–99)
HCT VFR BLD CALC: 42.9 % — SIGNIFICANT CHANGE UP (ref 39–50)
HGB BLD-MCNC: 13.8 G/DL — SIGNIFICANT CHANGE UP (ref 13–17)
INR BLD: 1.16 — SIGNIFICANT CHANGE UP (ref 0.88–1.16)
INR BLD: 1.2 — HIGH (ref 0.88–1.16)
MAGNESIUM SERPL-MCNC: 2.3 MG/DL — SIGNIFICANT CHANGE UP (ref 1.6–2.6)
MCHC RBC-ENTMCNC: 29.1 PG — SIGNIFICANT CHANGE UP (ref 27–34)
MCHC RBC-ENTMCNC: 32.2 GM/DL — SIGNIFICANT CHANGE UP (ref 32–36)
MCV RBC AUTO: 90.3 FL — SIGNIFICANT CHANGE UP (ref 80–100)
NRBC # BLD: 0 /100 WBCS — SIGNIFICANT CHANGE UP (ref 0–0)
PLATELET # BLD AUTO: 737 K/UL — HIGH (ref 150–400)
POTASSIUM SERPL-MCNC: 4.3 MMOL/L — SIGNIFICANT CHANGE UP (ref 3.5–5.3)
POTASSIUM SERPL-SCNC: 4.3 MMOL/L — SIGNIFICANT CHANGE UP (ref 3.5–5.3)
PROTHROM AB SERPL-ACNC: 13.3 SEC — HIGH (ref 10–12.9)
PROTHROM AB SERPL-ACNC: 13.8 SEC — HIGH (ref 10–12.9)
RBC # BLD: 4.75 M/UL — SIGNIFICANT CHANGE UP (ref 4.2–5.8)
RBC # FLD: 14.7 % — HIGH (ref 10.3–14.5)
SODIUM SERPL-SCNC: 139 MMOL/L — SIGNIFICANT CHANGE UP (ref 135–145)
WBC # BLD: 12.41 K/UL — HIGH (ref 3.8–10.5)
WBC # FLD AUTO: 12.41 K/UL — HIGH (ref 3.8–10.5)

## 2020-06-20 PROCEDURE — 99232 SBSQ HOSP IP/OBS MODERATE 35: CPT

## 2020-06-20 RX ORDER — HEPARIN SODIUM 5000 [USP'U]/ML
1200 INJECTION INTRAVENOUS; SUBCUTANEOUS
Qty: 25000 | Refills: 0 | Status: DISCONTINUED | OUTPATIENT
Start: 2020-06-20 | End: 2020-06-21

## 2020-06-20 RX ORDER — WARFARIN SODIUM 2.5 MG/1
8 TABLET ORAL ONCE
Refills: 0 | Status: COMPLETED | OUTPATIENT
Start: 2020-06-20 | End: 2020-06-20

## 2020-06-20 RX ORDER — LISINOPRIL 2.5 MG/1
2.5 TABLET ORAL DAILY
Refills: 0 | Status: DISCONTINUED | OUTPATIENT
Start: 2020-06-20 | End: 2020-06-22

## 2020-06-20 RX ADMIN — Medication 81 MILLIGRAM(S): at 12:08

## 2020-06-20 RX ADMIN — WARFARIN SODIUM 8 MILLIGRAM(S): 2.5 TABLET ORAL at 21:57

## 2020-06-20 RX ADMIN — CLOPIDOGREL BISULFATE 75 MILLIGRAM(S): 75 TABLET, FILM COATED ORAL at 12:08

## 2020-06-20 RX ADMIN — Medication 25 MILLIGRAM(S): at 05:41

## 2020-06-20 RX ADMIN — HEPARIN SODIUM 12 UNIT(S)/HR: 5000 INJECTION INTRAVENOUS; SUBCUTANEOUS at 23:23

## 2020-06-20 RX ADMIN — HEPARIN SODIUM 3500 UNIT(S): 5000 INJECTION INTRAVENOUS; SUBCUTANEOUS at 08:59

## 2020-06-20 RX ADMIN — LISINOPRIL 2.5 MILLIGRAM(S): 2.5 TABLET ORAL at 13:56

## 2020-06-20 RX ADMIN — ATORVASTATIN CALCIUM 80 MILLIGRAM(S): 80 TABLET, FILM COATED ORAL at 21:57

## 2020-06-20 NOTE — PROGRESS NOTE ADULT - SUBJECTIVE AND OBJECTIVE BOX
Interventional Cardiology PA Adult Progress Note    Subjective Assessment:  	  MEDICATIONS:  metoprolol succinate ER 25 milliGRAM(s) Oral daily            atorvastatin 80 milliGRAM(s) Oral at bedtime    aspirin enteric coated 81 milliGRAM(s) Oral daily  clopidogrel Tablet 75 milliGRAM(s) Oral daily  heparin   Injectable 7000 Unit(s) IV Push every 6 hours PRN  heparin   Injectable 3500 Unit(s) IV Push every 6 hours PRN  heparin  Infusion. 1000 Unit(s)/Hr IV Continuous <Continuous>  sodium chloride 0.9%. 500 milliLiter(s) IV Continuous <Continuous>      	    [PHYSICAL EXAM:  TELEMETRY:  T(C): 36.6 (06-20-20 @ 09:47), Max: 36.9 (06-19-20 @ 14:00)  HR: 94 (06-20-20 @ 09:10) (89 - 100)  BP: 112/65 (06-20-20 @ 09:10) (106/63 - 122/75)  RR: 19 (06-20-20 @ 09:10) (17 - 19)  SpO2: 96% (06-20-20 @ 09:10) (95% - 96%)  Wt(kg): --  I&O's Summary    19 Jun 2020 07:01  -  20 Jun 2020 07:00  --------------------------------------------------------  IN: 840 mL / OUT: 1400 mL / NET: -560 mL    20 Jun 2020 07:01  -  20 Jun 2020 10:10  --------------------------------------------------------  IN: 0 mL / OUT: 200 mL / NET: -200 mL        Larson:  Central/PICC/Mid Line:                                         Appearance: Normal	  Cardiovascular: Normal S1 S2 No murmurs,   Respiratory: Lungs clear to auscultation, No Rales, Rhonchi, Wheezing	  Extremities: Normal range of motion, No clubbing, cyanosis or edema  Vascular: Peripheral pulses palpable 2+ bilaterally  Neurologic: Non-focal, CN grossly intact  Psychiatry: A & O x 3, Mood & affect appropriate                        13.8   12.41 )-----------( 737      ( 20 Jun 2020 06:21 )             42.9     06-20    139  |  104  |  21  ----------------------------<  104<H>  4.3   |  23  |  1.16    Ca    9.0      20 Jun 2020 06:21  Mg     2.3     06-20      proBNP:   Lipid Profile:   HgA1c:   TSH:   PT/INR - ( 20 Jun 2020 06:21 )   PT: 13.3 sec;   INR: 1.16          PTT - ( 20 Jun 2020 06:21 )  PTT:49.9 sec    ASSESSMENT/PLAN: 	        DVT ppx:  Dispo: Interventional Cardiology PA Adult Progress Note    Subjective Assessment:  	  MEDICATIONS:  metoprolol succinate ER 25 milliGRAM(s) Oral daily            atorvastatin 80 milliGRAM(s) Oral at bedtime    aspirin enteric coated 81 milliGRAM(s) Oral daily  clopidogrel Tablet 75 milliGRAM(s) Oral daily  heparin   Injectable 7000 Unit(s) IV Push every 6 hours PRN  heparin   Injectable 3500 Unit(s) IV Push every 6 hours PRN  heparin  Infusion. 1000 Unit(s)/Hr IV Continuous <Continuous>  sodium chloride 0.9%. 500 milliLiter(s) IV Continuous <Continuous>      	    [PHYSICAL EXAM:  TELEMETRY:  T(C): 36.6 (06-20-20 @ 09:47), Max: 36.9 (06-19-20 @ 14:00)  HR: 94 (06-20-20 @ 09:10) (89 - 100)  BP: 112/65 (06-20-20 @ 09:10) (106/63 - 122/75)  RR: 19 (06-20-20 @ 09:10) (17 - 19)  SpO2: 96% (06-20-20 @ 09:10) (95% - 96%)  Wt(kg): --  I&O's Summary    19 Jun 2020 07:01  -  20 Jun 2020 07:00  --------------------------------------------------------  IN: 840 mL / OUT: 1400 mL / NET: -560 mL    20 Jun 2020 07:01  -  20 Jun 2020 10:10  --------------------------------------------------------  IN: 0 mL / OUT: 200 mL / NET: -200 mL        Larson:  Central/PICC/Mid Line:                                         Appearance: Normal	  Cardiovascular: Normal S1 S2 No murmurs,   Respiratory: Lungs clear to auscultation, No Rales, Rhonchi, Wheezing	  Extremities: Normal range of motion, No clubbing, cyanosis or edema  Vascular: Peripheral pulses palpable 2+ bilaterally  Neurologic: Non-focal, CN grossly intact  Psychiatry: A & O x 3, Mood & affect appropriate                        13.8   12.41 )-----------( 737      ( 20 Jun 2020 06:21 )             42.9     06-20    139  |  104  |  21  ----------------------------<  104<H>  4.3   |  23  |  1.16    Ca    9.0      20 Jun 2020 06:21  Mg     2.3     06-20        PT/INR - ( 20 Jun 2020 06:21 )   PT: 13.3 sec;   INR: 1.16          PTT - ( 20 Jun 2020 06:21 )  PTT:49.9 sec    ASSESSMENT/PLAN: 	        DVT ppx:  Dispo:

## 2020-06-20 NOTE — PROGRESS NOTE ADULT - PROBLEM SELECTOR PLAN 3
- Echo on 6/18/20 revealed No significant valvular disease There is mild concentric left ventricular hypertrophy. Left ventricular systolic function is moderately-to-severely reduced with a calculated ejection fraction of 30-35% with regional wall motion abnormalities. There is mid anteroseptal, mid inferoseptal, apical septal, apical lateral, apical inferior and apical anterior wall akinesis. There is a large 3.9 x 2 cm echogenic density adherent to the mid septal, apical septal and the apical wall. In the setting of regional wall motion this likely represents a thrombus. Apical portion of the thrombus appears mobile.  -clinically euvolemic, continue strict I/Os and daily weights.  -holding ARB for cardiac catheterization.  -transition metoprolol tartrate 12.5mg BID to Toprol XL 25mg  -Holding ACE/ARB bp 90s-100s/70s - Echo on 6/18/20 revealed No significant valvular disease There is mild concentric left ventricular hypertrophy. Left ventricular systolic function is moderately-to-severely reduced with a calculated ejection fraction of 30-35% with regional wall motion abnormalities. There is mid anteroseptal, mid inferoseptal, apical septal, apical lateral, apical inferior and apical anterior wall akinesis. There is a large 3.9 x 2 cm echogenic density adherent to the mid septal, apical septal and the apical wall. In the setting of regional wall motion this likely represents a thrombus. Apical portion of the thrombus appears mobile.  -clinically euvolemic, continue strict I/Os and daily weights.  -transition metoprolol tartrate 12.5mg BID to Toprol XL 25mg  -Added lisinopril 2.5mg daily

## 2020-06-20 NOTE — PROGRESS NOTE ADULT - PROBLEM SELECTOR PLAN 1
currently chest pain free, EKG NSR@94BPM with TWI lateral leads, pathologic Q waves and ALEX in anteroseptal precordium.   -Troponin T 0.06->0.10->0.15  -Echos as below  -loaded with ASA 325mg PO x 1 dose/Plavix 600mg x 1 dose and started on Heparin gtt in ED.  - continue atorvastatin 80 mg   -s/p cardiac cath 6/18 VENESSA to proxLAD (99% heavily Calicified), VENESSA to proxRCA (70%), VENESSA to midRCA (80%). distalLAD 50%, D2 ostial 60%, LCx OM1 40%, RCA dominant. R radial access  -Triple therapy (Aspirin 81mg, Plavix 75mg, and Coumadin)x 1 Week + ensure INR >2.5, then discontinue Aspirin.  -continue Heparin, INR 1.09 Continue Coumadin 5mg, until therapeutic  -continue Atorvastatin 80mg daily

## 2020-06-20 NOTE — PROGRESS NOTE ADULT - PROBLEM SELECTOR PLAN 5
WBC 15.43 on admit, downtrended to 13 without L shift, afebrile, CXR unremarkable, COVID PCR negative.  -UA negative  -blood culture ngtd  VTE PPx: heparin to coumadin bridge  Dispo: Pending therapeutic INR

## 2020-06-20 NOTE — PROGRESS NOTE ADULT - PROBLEM SELECTOR PLAN 4
- blood pressures on lower side, 90s-100s  - Lopressor 12.5mg BID transitioned to Toprol XL 25mg in AM  - Holding home losartan/HCTZ 100/25- ADD Losartan as blood pressure tolerates - blood pressures on lower side, 90s-100s  - Lopressor 12.5mg BID transitioned to Toprol XL 25mg in AM  - Started Lisinopril 2.5mg

## 2020-06-20 NOTE — PROGRESS NOTE ADULT - PROBLEM SELECTOR PLAN 2
- Echo on 6/18/20 revealed. There is a large 3.9 x 2 cm echogenic density adherent to the mid septal, apical septal and the apical wall. In the setting of regional wall motion this likely represents a thrombus. Apical portion of the thrombus appears mobile.  -continue heparin to Coumadin bridge, Continue Coumadin 5mg daily untill INR >2.5  - INR today 1.16, continue to monitor - Echo on 6/18/20 revealed. There is a large 3.9 x 2 cm echogenic density adherent to the mid septal, apical septal and the apical wall. In the setting of regional wall motion this likely represents a thrombus. Apical portion of the thrombus appears mobile.  -continue heparin to Coumadin bridge until INR >2.5, increased coumadin to 8mg 6/20/20  - INR today 1.16, continue to monitor

## 2020-06-20 NOTE — PROGRESS NOTE ADULT - ASSESSMENT
70 yr old M with PMHx of HTN presents to [facility] 6/17/20 with c/o intermittent exertional midsternal chest pressure, associated with SOB/dizziness/palpitations x 3 months. Outpatient Echo suspicious for anterior apical MI, large mobile apical thrombus, and LVEF reduced to 35-40%, EKG NSR@94BPM with TWI lateral leads, pathologic Q waves and ALEX in anteroseptal precordium. Pt admitted to cardiac telemetry for management of NSTEMI/apical thrombus. Patient now s/p cardiac cath on 6/18/20 VENESSA to proxLAD (99% heavily Calicified), VENESSA to proxRCA (70%), VENESSA to midRCA (80%). Patient had echo revealing large 3.9 x 2 cm echogenic density consistent with thrombus in the setting of wall motion abnormalities, EF 30-35%. Patient is now bridging from Heparin to coumadin with INR goal of 2.5 prior to discharge

## 2020-06-21 LAB
ALBUMIN SERPL ELPH-MCNC: 3.7 G/DL — SIGNIFICANT CHANGE UP (ref 3.3–5)
ALP SERPL-CCNC: 91 U/L — SIGNIFICANT CHANGE UP (ref 40–120)
ALT FLD-CCNC: 34 U/L — SIGNIFICANT CHANGE UP (ref 10–45)
ANION GAP SERPL CALC-SCNC: 10 MMOL/L — SIGNIFICANT CHANGE UP (ref 5–17)
APTT BLD: 51.4 SEC — HIGH (ref 27.5–36.3)
APTT BLD: 83 SEC — HIGH (ref 27.5–36.3)
APTT BLD: 94.6 SEC — HIGH (ref 27.5–36.3)
APTT BLD: >200 SEC — CRITICAL HIGH (ref 27.5–36.3)
AST SERPL-CCNC: 24 U/L — SIGNIFICANT CHANGE UP (ref 10–40)
BILIRUB SERPL-MCNC: 0.4 MG/DL — SIGNIFICANT CHANGE UP (ref 0.2–1.2)
BUN SERPL-MCNC: 19 MG/DL — SIGNIFICANT CHANGE UP (ref 7–23)
CALCIUM SERPL-MCNC: 9.1 MG/DL — SIGNIFICANT CHANGE UP (ref 8.4–10.5)
CHLORIDE SERPL-SCNC: 102 MMOL/L — SIGNIFICANT CHANGE UP (ref 96–108)
CO2 SERPL-SCNC: 24 MMOL/L — SIGNIFICANT CHANGE UP (ref 22–31)
CREAT SERPL-MCNC: 1.05 MG/DL — SIGNIFICANT CHANGE UP (ref 0.5–1.3)
GLUCOSE SERPL-MCNC: 96 MG/DL — SIGNIFICANT CHANGE UP (ref 70–99)
HCT VFR BLD CALC: 43.2 % — SIGNIFICANT CHANGE UP (ref 39–50)
HGB BLD-MCNC: 13.8 G/DL — SIGNIFICANT CHANGE UP (ref 13–17)
INR BLD: 1.27 — HIGH (ref 0.88–1.16)
MAGNESIUM SERPL-MCNC: 2.1 MG/DL — SIGNIFICANT CHANGE UP (ref 1.6–2.6)
MCHC RBC-ENTMCNC: 29.2 PG — SIGNIFICANT CHANGE UP (ref 27–34)
MCHC RBC-ENTMCNC: 31.9 GM/DL — LOW (ref 32–36)
MCV RBC AUTO: 91.5 FL — SIGNIFICANT CHANGE UP (ref 80–100)
NRBC # BLD: 0 /100 WBCS — SIGNIFICANT CHANGE UP (ref 0–0)
PLATELET # BLD AUTO: 787 K/UL — HIGH (ref 150–400)
POTASSIUM SERPL-MCNC: 4.2 MMOL/L — SIGNIFICANT CHANGE UP (ref 3.5–5.3)
POTASSIUM SERPL-SCNC: 4.2 MMOL/L — SIGNIFICANT CHANGE UP (ref 3.5–5.3)
PROT SERPL-MCNC: 6.7 G/DL — SIGNIFICANT CHANGE UP (ref 6–8.3)
PROTHROM AB SERPL-ACNC: 14.6 SEC — HIGH (ref 10–12.9)
RBC # BLD: 4.72 M/UL — SIGNIFICANT CHANGE UP (ref 4.2–5.8)
RBC # FLD: 14.7 % — HIGH (ref 10.3–14.5)
SODIUM SERPL-SCNC: 136 MMOL/L — SIGNIFICANT CHANGE UP (ref 135–145)
WBC # BLD: 11.83 K/UL — HIGH (ref 3.8–10.5)
WBC # FLD AUTO: 11.83 K/UL — HIGH (ref 3.8–10.5)

## 2020-06-21 PROCEDURE — 99232 SBSQ HOSP IP/OBS MODERATE 35: CPT

## 2020-06-21 RX ORDER — WARFARIN SODIUM 2.5 MG/1
10 TABLET ORAL ONCE
Refills: 0 | Status: COMPLETED | OUTPATIENT
Start: 2020-06-21 | End: 2020-06-21

## 2020-06-21 RX ORDER — HEPARIN SODIUM 5000 [USP'U]/ML
1400 INJECTION INTRAVENOUS; SUBCUTANEOUS
Qty: 25000 | Refills: 0 | Status: DISCONTINUED | OUTPATIENT
Start: 2020-06-21 | End: 2020-06-22

## 2020-06-21 RX ADMIN — HEPARIN SODIUM 14 UNIT(S)/HR: 5000 INJECTION INTRAVENOUS; SUBCUTANEOUS at 10:33

## 2020-06-21 RX ADMIN — WARFARIN SODIUM 10 MILLIGRAM(S): 2.5 TABLET ORAL at 21:54

## 2020-06-21 RX ADMIN — Medication 25 MILLIGRAM(S): at 06:53

## 2020-06-21 RX ADMIN — Medication 81 MILLIGRAM(S): at 12:29

## 2020-06-21 RX ADMIN — CLOPIDOGREL BISULFATE 75 MILLIGRAM(S): 75 TABLET, FILM COATED ORAL at 12:29

## 2020-06-21 RX ADMIN — ATORVASTATIN CALCIUM 80 MILLIGRAM(S): 80 TABLET, FILM COATED ORAL at 21:54

## 2020-06-21 NOTE — PROGRESS NOTE ADULT - PROBLEM SELECTOR PLAN 5
WBC 15.43 on admit, afebrile, CXR unremarkable, COVID PCR negative.  -continuing to downtrend 11.83 6/21  -UA negative  -blood culture ngtd    VTE PPx: heparin to coumadin bridge  Dispo: Pending therapeutic INR

## 2020-06-21 NOTE — PROGRESS NOTE ADULT - PROBLEM SELECTOR PLAN 4
- blood pressures on lower side, 90s-100s  - Lopressor 12.5mg BID transitioned to Toprol XL 25mg in AM  - Started Lisinopril 2.5mg 6/20

## 2020-06-21 NOTE — PROGRESS NOTE ADULT - PROBLEM SELECTOR PLAN 3
- Echo on 6/18/20 revealed No significant valvular disease There is mild concentric left ventricular hypertrophy. Left ventricular systolic function is moderately-to-severely reduced with a calculated ejection fraction of 30-35% with regional wall motion abnormalities. There is mid anteroseptal, mid inferoseptal, apical septal, apical lateral, apical inferior and apical anterior wall akinesis. There is a large 3.9 x 2 cm echogenic density adherent to the mid septal, apical septal and the apical wall. In the setting of regional wall motion this likely represents a thrombus. Apical portion of the thrombus appears mobile.  -clinically euvolemic, continue strict I/Os and daily weights.  -transition metoprolol tartrate 12.5mg BID to Toprol XL 25mg  -started lisinopril 2.5mg daily 6/20

## 2020-06-21 NOTE — PROGRESS NOTE ADULT - PROBLEM SELECTOR PLAN 1
currently chest pain free, EKG NSR@94BPM with TWI lateral leads, pathologic Q waves and ALEX in anteroseptal precordium.   -Troponin T 0.06->0.10->0.15  -Echos as below  -loaded with ASA 325mg PO x 1 dose/Plavix 600mg x 1 dose and started on Heparin gtt in ED.  - continue atorvastatin 80 mg   -s/p cardiac cath 6/18 VENESSA to proxLAD (99% heavily Calicified), VENESSA to proxRCA (70%), VENESSA to midRCA (80%). distalLAD 50%, D2 ostial 60%, LCx OM1 40%, RCA dominant. R radial access  -Triple therapy (Aspirin 81mg, Plavix 75mg, and Coumadin)x 1 Week + ensure INR >2.5, then discontinue Aspirin.  -continue Heparin bridging to Coumadin   -continue Atorvastatin 80mg daily

## 2020-06-21 NOTE — PROGRESS NOTE ADULT - SUBJECTIVE AND OBJECTIVE BOX
Interventional Cardiology PA Adult Progress Note    Subjective Assessment: pt seen and examined at bedside this am. No complaints at this time. Denies CP, SOB, N/V/D, hematuria or melena.    ROS negative except for subjective and HPI   	  MEDICATIONS:  lisinopril 2.5 milliGRAM(s) Oral daily  metoprolol succinate ER 25 milliGRAM(s) Oral daily            atorvastatin 80 milliGRAM(s) Oral at bedtime    aspirin enteric coated 81 milliGRAM(s) Oral daily  clopidogrel Tablet 75 milliGRAM(s) Oral daily  heparin  Infusion 1400 Unit(s)/Hr IV Continuous <Continuous>  sodium chloride 0.9%. 500 milliLiter(s) IV Continuous <Continuous>      	    [PHYSICAL EXAM:  TELEMETRY:  T(C): 35.8 (06-21-20 @ 09:00), Max: 36.6 (06-21-20 @ 01:15)  HR: 90 (06-21-20 @ 09:24) (78 - 100)  BP: 104/70 (06-21-20 @ 09:24) (103/71 - 125/71)  RR: 18 (06-21-20 @ 09:24) (16 - 18)  SpO2: 95% (06-21-20 @ 09:24) (93% - 98%)  Wt(kg): --  I&O's Summary    20 Jun 2020 07:01  -  21 Jun 2020 07:00  --------------------------------------------------------  IN: 42 mL / OUT: 1800 mL / NET: -1758 mL    21 Jun 2020 07:01  -  21 Jun 2020 10:23  --------------------------------------------------------  IN: 38 mL / OUT: 0 mL / NET: 38 mL        Larson:  Central/PICC/Mid Line:                                         Appearance: Normal	  HEENT:   Normal oral mucosa, PERRL, EOMI	  Neck: Supple,  - JVD  Cardiovascular: Normal S1 S2, No JVD, No murmurs  Respiratory: Lungs clear to auscultation, No Rales, Rhonchi, Wheezing	  Gastrointestinal:  Soft, Non-tender, + BS	  Skin: No rashes, No ecchymoses, No cyanosis  Extremities: Normal range of motion, No clubbing, cyanosis or edema  Vascular: Peripheral pulses palpable 2+ bilaterally, Right radial site stable without hematoma and distal pulse +2  Neurologic: Non-focal  Psychiatry: A & O x 3, Mood & affect appropriate      	    ECG:  	  RADIOLOGY:   DIAGNOSTIC TESTING:  [ ] Echocardiogram:  [ ]  Catheterization:  [ ] Stress Test:    [ ] KEVIN  OTHER: 	    LABS:	 	  CARDIAC MARKERS:                                  13.8   11.83 )-----------( 787      ( 21 Jun 2020 05:59 )             43.2     06-21    136  |  102  |  19  ----------------------------<  96  4.2   |  24  |  1.05    Ca    9.1      21 Jun 2020 05:59  Mg     2.1     06-21    TPro  6.7  /  Alb  3.7  /  TBili  0.4  /  DBili  x   /  AST  24  /  ALT  34  /  AlkPhos  91  06-21    proBNP:   Lipid Profile:   HgA1c:   TSH:   PT/INR - ( 21 Jun 2020 05:59 )   PT: 14.6 sec;   INR: 1.27          PTT - ( 21 Jun 2020 05:59 )  PTT:51.4 sec    ASSESSMENT/PLAN: 	        DVT ppx:  Dispo:

## 2020-06-21 NOTE — PROGRESS NOTE ADULT - PROBLEM SELECTOR PLAN 2
- Echo on 6/18/20 revealed. There is a large 3.9 x 2 cm echogenic density adherent to the mid septal, apical septal and the apical wall. In the setting of regional wall motion this likely represents a thrombus. Apical portion of the thrombus appears mobile.  -continue heparin to Coumadin bridge until INR >2.5, increased coumadin to 8mg 6/20/20  - INR today 6/21 1.27 continue to monitor - Echo on 6/18/20 revealed. There is a large 3.9 x 2 cm echogenic density adherent to the mid septal, apical septal and the apical wall. In the setting of regional wall motion this likely represents a thrombus. Apical portion of the thrombus appears mobile.  -continue heparin to Coumadin bridge until INR >2.5, increased coumadin to 10mg 6/21/20  - INR today 6/21 1.27 continue to monitor

## 2020-06-21 NOTE — PROGRESS NOTE ADULT - ASSESSMENT
70 yr old M with PMHx of HTN presents to [facility] 6/17/20 with c/o intermittent exertional midsternal chest pressure, associated with SOB/dizziness/palpitations x 3 months. Outpatient Echo suspicious for anterior apical MI, large mobile apical thrombus, and LVEF reduced to 35-40%, EKG NSR@94BPM with TWI lateral leads, pathologic Q waves and ALEX in anteroseptal precordium. Pt admitted to cardiac telemetry for management of NSTEMI/apical thrombus. Patient now s/p cardiac cath on 6/18/20 VENESSA to proxLAD (99% heavily Calicified), VENESSA to proxRCA (70%), VENESSA to midRCA (80%). Patient had echo revealing large 3.9 x 2 cm echogenic density consistent with thrombus in the setting of wall motion abnormalities, EF 30-35%. Patient is now bridging from Heparin to coumadin with INR goal of 2.5 prior to discharge

## 2020-06-22 ENCOUNTER — TRANSCRIPTION ENCOUNTER (OUTPATIENT)
Age: 70
End: 2020-06-22

## 2020-06-22 VITALS — TEMPERATURE: 97 F

## 2020-06-22 PROBLEM — I10 ESSENTIAL (PRIMARY) HYPERTENSION: Chronic | Status: ACTIVE | Noted: 2020-06-19

## 2020-06-22 PROBLEM — I10 ESSENTIAL (PRIMARY) HYPERTENSION: Chronic | Status: ACTIVE | Noted: 2020-06-17

## 2020-06-22 LAB
ANION GAP SERPL CALC-SCNC: 14 MMOL/L — SIGNIFICANT CHANGE UP (ref 5–17)
APTT BLD: 160 SEC — CRITICAL HIGH (ref 27.5–36.3)
BUN SERPL-MCNC: 20 MG/DL — SIGNIFICANT CHANGE UP (ref 7–23)
CALCIUM SERPL-MCNC: 9.1 MG/DL — SIGNIFICANT CHANGE UP (ref 8.4–10.5)
CHLORIDE SERPL-SCNC: 106 MMOL/L — SIGNIFICANT CHANGE UP (ref 96–108)
CO2 SERPL-SCNC: 20 MMOL/L — LOW (ref 22–31)
CREAT SERPL-MCNC: 1.06 MG/DL — SIGNIFICANT CHANGE UP (ref 0.5–1.3)
CULTURE RESULTS: SIGNIFICANT CHANGE UP
CULTURE RESULTS: SIGNIFICANT CHANGE UP
GLUCOSE SERPL-MCNC: 95 MG/DL — SIGNIFICANT CHANGE UP (ref 70–99)
HCT VFR BLD CALC: 44.1 % — SIGNIFICANT CHANGE UP (ref 39–50)
HGB BLD-MCNC: 14.6 G/DL — SIGNIFICANT CHANGE UP (ref 13–17)
INR BLD: 1.89 — HIGH (ref 0.88–1.16)
INR BLD: 2.36 — HIGH (ref 0.88–1.16)
MAGNESIUM SERPL-MCNC: 2.1 MG/DL — SIGNIFICANT CHANGE UP (ref 1.6–2.6)
MCHC RBC-ENTMCNC: 29.3 PG — SIGNIFICANT CHANGE UP (ref 27–34)
MCHC RBC-ENTMCNC: 33.1 GM/DL — SIGNIFICANT CHANGE UP (ref 32–36)
MCV RBC AUTO: 88.4 FL — SIGNIFICANT CHANGE UP (ref 80–100)
NRBC # BLD: 0 /100 WBCS — SIGNIFICANT CHANGE UP (ref 0–0)
PLATELET # BLD AUTO: 881 K/UL — HIGH (ref 150–400)
POTASSIUM SERPL-MCNC: 4.5 MMOL/L — SIGNIFICANT CHANGE UP (ref 3.5–5.3)
POTASSIUM SERPL-SCNC: 4.5 MMOL/L — SIGNIFICANT CHANGE UP (ref 3.5–5.3)
PROTHROM AB SERPL-ACNC: 22 SEC — HIGH (ref 10–12.9)
PROTHROM AB SERPL-ACNC: 27.6 SEC — HIGH (ref 10–12.9)
RBC # BLD: 4.99 M/UL — SIGNIFICANT CHANGE UP (ref 4.2–5.8)
RBC # FLD: 14.7 % — HIGH (ref 10.3–14.5)
SODIUM SERPL-SCNC: 140 MMOL/L — SIGNIFICANT CHANGE UP (ref 135–145)
SPECIMEN SOURCE: SIGNIFICANT CHANGE UP
SPECIMEN SOURCE: SIGNIFICANT CHANGE UP
WBC # BLD: 12.57 K/UL — HIGH (ref 3.8–10.5)
WBC # FLD AUTO: 12.57 K/UL — HIGH (ref 3.8–10.5)

## 2020-06-22 PROCEDURE — 83690 ASSAY OF LIPASE: CPT

## 2020-06-22 PROCEDURE — 99239 HOSP IP/OBS DSCHRG MGMT >30: CPT

## 2020-06-22 PROCEDURE — 85027 COMPLETE CBC AUTOMATED: CPT

## 2020-06-22 PROCEDURE — 96374 THER/PROPH/DIAG INJ IV PUSH: CPT

## 2020-06-22 PROCEDURE — 85730 THROMBOPLASTIN TIME PARTIAL: CPT

## 2020-06-22 PROCEDURE — 93005 ELECTROCARDIOGRAM TRACING: CPT

## 2020-06-22 PROCEDURE — 85025 COMPLETE CBC W/AUTO DIFF WBC: CPT

## 2020-06-22 PROCEDURE — C8929: CPT

## 2020-06-22 PROCEDURE — 86803 HEPATITIS C AB TEST: CPT

## 2020-06-22 PROCEDURE — C1725: CPT

## 2020-06-22 PROCEDURE — 82553 CREATINE MB FRACTION: CPT

## 2020-06-22 PROCEDURE — C1894: CPT

## 2020-06-22 PROCEDURE — 83880 ASSAY OF NATRIURETIC PEPTIDE: CPT

## 2020-06-22 PROCEDURE — 83735 ASSAY OF MAGNESIUM: CPT

## 2020-06-22 PROCEDURE — C1724: CPT

## 2020-06-22 PROCEDURE — 87635 SARS-COV-2 COVID-19 AMP PRB: CPT

## 2020-06-22 PROCEDURE — 36415 COLL VENOUS BLD VENIPUNCTURE: CPT

## 2020-06-22 PROCEDURE — 87040 BLOOD CULTURE FOR BACTERIA: CPT

## 2020-06-22 PROCEDURE — 99291 CRITICAL CARE FIRST HOUR: CPT | Mod: 25

## 2020-06-22 PROCEDURE — 85610 PROTHROMBIN TIME: CPT

## 2020-06-22 PROCEDURE — 80053 COMPREHEN METABOLIC PANEL: CPT

## 2020-06-22 PROCEDURE — C1874: CPT

## 2020-06-22 PROCEDURE — 80061 LIPID PANEL: CPT

## 2020-06-22 PROCEDURE — 82550 ASSAY OF CK (CPK): CPT

## 2020-06-22 PROCEDURE — 84484 ASSAY OF TROPONIN QUANT: CPT

## 2020-06-22 PROCEDURE — C1769: CPT

## 2020-06-22 PROCEDURE — 80048 BASIC METABOLIC PNL TOTAL CA: CPT

## 2020-06-22 PROCEDURE — 71045 X-RAY EXAM CHEST 1 VIEW: CPT

## 2020-06-22 PROCEDURE — C1887: CPT

## 2020-06-22 PROCEDURE — 83036 HEMOGLOBIN GLYCOSYLATED A1C: CPT

## 2020-06-22 RX ORDER — HEPARIN SODIUM 5000 [USP'U]/ML
1100 INJECTION INTRAVENOUS; SUBCUTANEOUS
Qty: 25000 | Refills: 0 | Status: DISCONTINUED | OUTPATIENT
Start: 2020-06-22 | End: 2020-06-22

## 2020-06-22 RX ORDER — PANTOPRAZOLE SODIUM 20 MG/1
1 TABLET, DELAYED RELEASE ORAL
Qty: 30 | Refills: 3
Start: 2020-06-22 | End: 2020-10-19

## 2020-06-22 RX ORDER — ATORVASTATIN CALCIUM 80 MG/1
1 TABLET, FILM COATED ORAL
Qty: 30 | Refills: 3
Start: 2020-06-22 | End: 2020-10-19

## 2020-06-22 RX ORDER — LOSARTAN/HYDROCHLOROTHIAZIDE 100MG-25MG
1 TABLET ORAL
Qty: 0 | Refills: 0 | DISCHARGE

## 2020-06-22 RX ORDER — ASPIRIN/CALCIUM CARB/MAGNESIUM 324 MG
1 TABLET ORAL
Qty: 0 | Refills: 0 | DISCHARGE

## 2020-06-22 RX ORDER — CLOPIDOGREL BISULFATE 75 MG/1
1 TABLET, FILM COATED ORAL
Qty: 30 | Refills: 11
Start: 2020-06-22 | End: 2021-06-16

## 2020-06-22 RX ORDER — LISINOPRIL 2.5 MG/1
1 TABLET ORAL
Qty: 30 | Refills: 3
Start: 2020-06-22 | End: 2020-10-19

## 2020-06-22 RX ORDER — METOPROLOL TARTRATE 50 MG
1 TABLET ORAL
Qty: 30 | Refills: 3
Start: 2020-06-22 | End: 2020-10-19

## 2020-06-22 RX ORDER — FUROSEMIDE 40 MG
1 TABLET ORAL
Qty: 30 | Refills: 0
Start: 2020-06-22 | End: 2020-07-21

## 2020-06-22 RX ORDER — ASPIRIN/CALCIUM CARB/MAGNESIUM 324 MG
1 TABLET ORAL
Qty: 3 | Refills: 0
Start: 2020-06-22 | End: 2020-06-24

## 2020-06-22 RX ORDER — PANTOPRAZOLE SODIUM 20 MG/1
40 TABLET, DELAYED RELEASE ORAL
Refills: 0 | Status: DISCONTINUED | OUTPATIENT
Start: 2020-06-22 | End: 2020-06-22

## 2020-06-22 RX ORDER — WARFARIN SODIUM 2.5 MG/1
1 TABLET ORAL
Qty: 30 | Refills: 3
Start: 2020-06-22 | End: 2020-10-19

## 2020-06-22 RX ORDER — HEPARIN SODIUM 5000 [USP'U]/ML
1200 INJECTION INTRAVENOUS; SUBCUTANEOUS
Qty: 25000 | Refills: 0 | Status: DISCONTINUED | OUTPATIENT
Start: 2020-06-22 | End: 2020-06-22

## 2020-06-22 RX ADMIN — Medication 81 MILLIGRAM(S): at 11:43

## 2020-06-22 RX ADMIN — Medication 25 MILLIGRAM(S): at 05:14

## 2020-06-22 RX ADMIN — CLOPIDOGREL BISULFATE 75 MILLIGRAM(S): 75 TABLET, FILM COATED ORAL at 11:43

## 2020-06-22 RX ADMIN — PANTOPRAZOLE SODIUM 40 MILLIGRAM(S): 20 TABLET, DELAYED RELEASE ORAL at 14:21

## 2020-06-22 RX ADMIN — HEPARIN SODIUM 11 UNIT(S)/HR: 5000 INJECTION INTRAVENOUS; SUBCUTANEOUS at 07:30

## 2020-06-22 NOTE — PROGRESS NOTE ADULT - ATTENDING COMMENTS
See PA note written above, for details. I reviewed the PA documentation.  I have personally seen and examined this patient. I reviewed vitals, labs, medications, cardiac studies and additional imaging.  I agree with the PA's findings and plans as written above with the following additions/amendments:  70M with Essential HTN presented 6/17/20 with unstable angina. Outpatient Echo suspicious for anterior apical MI, large mobile apical thrombus, and LVEF reduced to 35-40%, EKG NSR@94BPM with TWI lateral leads, pathologic Q waves and ALEX in anteroseptal precordium. LHC 6/18/20 notable for obstructive LAD and RCA disease now s/p VENESSA to proxLAD (99% heavily Calicified), VENESSA to proxRCA (70%), VENESSA to midRCA (80%). TTE inpatient revealed large 3.9 x 2 cm echogenic density consistent with thrombus in the setting of wall motion abnormalities, EF 30-35%. Patient undergoing Heparin to coumadin bridge with INR goal of 2-3 prior to discharge    Physical Exam notable for: elderly fe/male laying in bed in NAD, flat neck veins, RRR, no MGR detected, clear lungs, overly nourished abdomen, NTTP, no fluid wave detected, +BS, 2+ peripheral pulses, no pretibial pitting edema, no ankle edema, skin WWP throughout, A&Ox3, independently ambulatory with no assistance    Patient achieved therapeutic INR 2.36 today  DC heparin  Coumadin 5mg qHS, INR check with PCP   DAPT with ASA Plavix through 6/25 then drop ASA per interventional instructions  Protonix added to regimen for GI protection  Atorva 80qHS  Toprol 25 XL daily  Lisinopril 2.5mg daily  Patient to discharge home 6/22 in care of family with PCP and Cardiology follow up scheduled within 1-2 weeks.  CAROLE Hadley.  Cardiology Attending
I have personally seen, examined, and participated in the care of this patient. I have reviewed all pertinent clinical information, including history, physical exam, plan, laboratory, imaging and the PA's note and agree with the above.    -s/p CATH:  99 plad VENESSA, 90 p/mrca. On asa/plavix/metoprolol/statin.   -EF 30 with massive LV thrombus-  started coumadin- 5 mg last night  with goal INR 2.5, Cont IV heparin until therapeutic INR  -Transition to toprol XL 50 mg qd, Can add ACE if BP tolerates 6/20  -Patient to stay in house until inr therapeutic due to high CVA risk with LV thrombus  -Plan for DAPT + AC x 1 week, then plavix and coumadin  -Discussed with outpatient cardiologist - Dr Woods
I have personally seen, examined, and participated in the care of this patient. I have reviewed all pertinent clinical information, including history, physical exam, plan, laboratory, imaging and the PA's note and agree with the above.    -s/p CATH:  99 plad VENESSA, 90 p/mrca. On asa/plavix/metoprolol/statin  -EF 30 with massive LV thrombus- start coumadin tonight 5 mg with goal INR 2.5, Cont IV heparin until therapeutic INR  -Add ACE in am 6/19  -Dispo pending therapeutic INR

## 2020-06-22 NOTE — DISCHARGE NOTE NURSING/CASE MANAGEMENT/SOCIAL WORK - PATIENT PORTAL LINK FT
You can access the FollowMyHealth Patient Portal offered by [facility] by registering at the following website: [email or web address]. By joining [employer]’s FollowMyHealth portal, you will also be able to view your health information using other applications (apps) compatible with our system.

## 2020-06-22 NOTE — PROGRESS NOTE ADULT - PROBLEM SELECTOR PLAN 2
- Echo on 6/18/20 revealed. There is a large 3.9 x 2 cm echogenic density adherent to the mid septal, apical septal and the apical wall. In the setting of regional wall motion this likely represents a thrombus. Apical portion of the thrombus appears mobile.  -continue heparin to Coumadin bridge until INR >2, increased coumadin to 10mg 6/21/20  - INR today 6/22 AM 1.89  - f/u repeat INR @ 1pm and if >2 can be d/c home today and If <2, coumadin 10 mg X1 tonight 6/22

## 2020-06-22 NOTE — DIETITIAN INITIAL EVALUATION ADULT. - PROBLEM SELECTOR PLAN 3
EF:35-40% by outpatient Echo 6/17/20.  --clinically euvolemic, continue strict I/Os and daily weights.  --holding ARB in anticipation of cardiac catheterization.

## 2020-06-22 NOTE — PROGRESS NOTE ADULT - PROBLEM SELECTOR PLAN 5
WBC 15.43 on admit, afebrile, CXR unremarkable, COVID PCR negative.  -continuing to downtrend   -UA negative  -blood culture ngtd    VTE PPx: heparin to coumadin bridge  Dispo: Pending therapeutic INR

## 2020-06-22 NOTE — PROGRESS NOTE ADULT - PROBLEM SELECTOR PLAN 3
- Echo on 6/18/20 revealed No significant valvular disease There is mild concentric left ventricular hypertrophy. Left ventricular systolic function is moderately-to-severely reduced with a calculated ejection fraction of 30-35% with regional wall motion abnormalities. There is mid anteroseptal, mid inferoseptal, apical septal, apical lateral, apical inferior and apical anterior wall akinesis. There is a large 3.9 x 2 cm echogenic density adherent to the mid septal, apical septal and the apical wall. In the setting of regional wall motion this likely represents a thrombus. Apical portion of the thrombus appears mobile.  -clinically euvolemic, continue strict I/Os and daily weights.  -c/w Toprol 25 mg and lisinopril 2.5 mg

## 2020-06-22 NOTE — DIETITIAN INITIAL EVALUATION ADULT. - OTHER INFO
70M with PMHx of HTN presents to [facility] 6/17/20 with c/o intermittent exertional midsternal chest pressure, associated with SOB/dizziness/palpitations x 3 months. Outpatient Echo suspicious for anterior apical MI, large mobile apical thrombus. Pt admitted to cardiac telemetry for management of NSTEMI/apical thrombus. Patient now s/p cardiac cath on 6/18/20 VENESSA to proxLAD (99% heavily Calicified), VENESSA to proxRCA (70%), VENESSA to midRCA (80%). Patient is now bridging from Heparin to coumadin with INR goal of 2 prior to discharge, INR currently 2.36. DC note in place for this afternoon. No noted n/v/d/c, chewing/ swallowing issues or pain impacting intake, skin is intact, jigar score 21. NKFA or changes in wt. Discussed DASH diet and purpose of FR. Will follow per protocol if remains admitted.

## 2020-06-22 NOTE — DIETITIAN INITIAL EVALUATION ADULT. - ENERGY NEEDS
Ideal body weight used for calculations as pt >120% of IBW.   ABW 88.9kg, IBW 72kg, 123% IBW, ht 69", BMI 28.9   Nutrient needs based on [facility] standards of care for maintenance in adults, adjusted for age

## 2020-06-22 NOTE — PROGRESS NOTE ADULT - PROBLEM SELECTOR PLAN 1
currently chest pain free, EKG NSR@94BPM with TWI lateral leads, pathologic Q waves and ALEX in anteroseptal precordium.   -Troponin T 0.06->0.10->0.15  -Echos as below  -loaded with ASA 325mg PO x 1 dose/Plavix 600mg x 1 dose and started on Heparin gtt in ED.  - continue atorvastatin 80 mg   -s/p cardiac cath 6/18 VENESSA to proxLAD (99% heavily Calicified), VENESSA to proxRCA (70%), VENESSA to midRCA (80%). distalLAD 50%, D2 ostial 60%, LCx OM1 40%, RCA dominant. R radial access  -Triple therapy (Aspirin 81mg, Plavix 75mg, and Coumadin)x 1 Week + ensure INR >2, then discontinue Aspirin.  -continue Heparin bridging to Coumadin   -continue Atorvastatin 80mg daily

## 2020-06-22 NOTE — PROGRESS NOTE ADULT - SUBJECTIVE AND OBJECTIVE BOX
Interventional Cardiology PA Adult Progress Note    Subjective Assessment: pt seen and examined at bedside this am. No complaints at this time. Denies CP, SOB, N/V/D, dizziness    ROS negative except for subjective and HPI  	  MEDICATIONS:  lisinopril 2.5 milliGRAM(s) Oral daily  metoprolol succinate ER 25 milliGRAM(s) Oral daily            atorvastatin 80 milliGRAM(s) Oral at bedtime    aspirin enteric coated 81 milliGRAM(s) Oral daily  clopidogrel Tablet 75 milliGRAM(s) Oral daily  heparin  Infusion 1100 Unit(s)/Hr IV Continuous <Continuous>  sodium chloride 0.9%. 500 milliLiter(s) IV Continuous <Continuous>      	    [PHYSICAL EXAM:  TELEMETRY:  T(C): 36.8 (06-22-20 @ 09:05), Max: 37.2 (06-22-20 @ 01:01)  HR: 84 (06-22-20 @ 06:28) (84 - 94)  BP: 105/69 (06-22-20 @ 06:28) (105/69 - 122/83)  RR: 15 (06-22-20 @ 06:28) (15 - 19)  SpO2: 96% (06-22-20 @ 06:28) (95% - 97%)  Wt(kg): --  I&O's Summary    21 Jun 2020 07:01  -  22 Jun 2020 07:00  --------------------------------------------------------  IN: 624 mL / OUT: 1300 mL / NET: -676 mL        Larson:  Central/PICC/Mid Line:                                         Appearance: Normal	  HEENT:   Normal oral mucosa, PERRL, EOMI	  Neck: Supple,  - JVD  Cardiovascular: Normal S1 S2, No JVD, No murmurs   Respiratory: Lungs clear to auscultation, No Rales, Rhonchi, Wheezing	  Gastrointestinal:  Soft, Non-tender, + BS	  Skin: No rashes, No ecchymoses, No cyanosis  Extremities: Normal range of motion, No clubbing, cyanosis or edema, Right radial access site stable without hematoma  Vascular: Peripheral pulses palpable 2+ bilaterally  Neurologic: Non-focal  Psychiatry: A & O x 3, Mood & affect appropriate      	    ECG:  	  RADIOLOGY:   DIAGNOSTIC TESTING:  [ ] Echocardiogram:  [ ]  Catheterization:  [ ] Stress Test:    [ ] KEVIN  OTHER: 	    LABS:	 	  CARDIAC MARKERS:                                  14.6   12.57 )-----------( 881      ( 22 Jun 2020 05:45 )             44.1     06-22    140  |  106  |  20  ----------------------------<  95  4.5   |  20<L>  |  1.06    Ca    9.1      22 Jun 2020 05:45  Mg     2.1     06-22    TPro  6.7  /  Alb  3.7  /  TBili  0.4  /  DBili  x   /  AST  24  /  ALT  34  /  AlkPhos  91  06-21    proBNP:   Lipid Profile:   HgA1c:   TSH:   PT/INR - ( 22 Jun 2020 05:45 )   PT: 22.0 sec;   INR: 1.89          PTT - ( 22 Jun 2020 05:45 )  PTT:160.0 sec    ASSESSMENT/PLAN: 	        DVT ppx:  Dispo:

## 2020-06-22 NOTE — PROGRESS NOTE ADULT - ASSESSMENT
70 yr old M with PMHx of HTN presents to [facility] 6/17/20 with c/o intermittent exertional midsternal chest pressure, associated with SOB/dizziness/palpitations x 3 months. Outpatient Echo suspicious for anterior apical MI, large mobile apical thrombus, and LVEF reduced to 35-40%, EKG NSR@94BPM with TWI lateral leads, pathologic Q waves and ALEX in anteroseptal precordium. Pt admitted to cardiac telemetry for management of NSTEMI/apical thrombus. Patient now s/p cardiac cath on 6/18/20 VENESSA to proxLAD (99% heavily Calicified), VENESSA to proxRCA (70%), VENESSA to midRCA (80%). Patient had echo revealing large 3.9 x 2 cm echogenic density consistent with thrombus in the setting of wall motion abnormalities, EF 30-35%. Patient is now bridging from Heparin to coumadin with INR goal of 2 prior to discharge

## 2020-06-26 ENCOUNTER — APPOINTMENT (OUTPATIENT)
Dept: HEART AND VASCULAR | Facility: CLINIC | Age: 70
End: 2020-06-26
Payer: MEDICARE

## 2020-06-26 VITALS
TEMPERATURE: 98.2 F | OXYGEN SATURATION: 97 % | WEIGHT: 191 LBS | BODY MASS INDEX: 28.62 KG/M2 | RESPIRATION RATE: 14 BRPM | HEIGHT: 68.5 IN | SYSTOLIC BLOOD PRESSURE: 130 MMHG | HEART RATE: 92 BPM | DIASTOLIC BLOOD PRESSURE: 80 MMHG

## 2020-06-26 DIAGNOSIS — Z78.9 OTHER SPECIFIED HEALTH STATUS: ICD-10-CM

## 2020-06-26 PROCEDURE — 99214 OFFICE O/P EST MOD 30 MIN: CPT

## 2020-06-26 RX ORDER — ASPIRIN 81 MG
81 TABLET, DELAYED RELEASE (ENTERIC COATED) ORAL
Refills: 0 | Status: ACTIVE | COMMUNITY

## 2020-06-26 RX ORDER — LOSARTAN POTASSIUM 100 MG/1
TABLET, FILM COATED ORAL DAILY
Refills: 0 | Status: DISCONTINUED | COMMUNITY
End: 2020-06-26

## 2020-06-26 NOTE — REVIEW OF SYSTEMS
[Dyspnea on exertion] : dyspnea during exertion [Under Stress] : under stress [Anxiety] : anxiety [Negative] : Heme/Lymph

## 2020-06-26 NOTE — REASON FOR VISIT
[Follow-Up - From Hospitalization] : follow-up of a recent hospitalization for [Coronary Artery Disease] : coronary artery disease [Hyperlipidemia] : hyperlipidemia [FreeTextEntry2] : s/p 2 vessel PTCA/VENESSA, large LV thrombus post  recent MI  [Prior Myocardial Infarction] : a prior myocardial infarction [FreeTextEntry1] : 70 year old Richard male with hypertension  s/p recent anterior wall motion complicated by large LV thrombus was discharged June 22nd after successful PTCA/VENESSA of p LAD and prox and mid RCA. \par \par \par He is on triple therapy : warfarin, aspirin 81mg and clopidogrel. \par \par \par   . \par \par

## 2020-06-26 NOTE — DISCUSSION/SUMMARY
[Coronary Artery Disease] : coronary artery disease [None] : none [Stable] : stable [Dietary Modification] : dietary modification [With Me] : with me [___ Month(s)] : [unfilled] month(s) [Exercise Regimen] : an exercise regimen [FreeTextEntry1] : follow up echo to assess status of LV thrombus \par \par target INR 2.5-3.0\par \par counseled regarding diet  and walking exercise, no driving until further notice \par \par medications reconciled \par \par  [de-identified] : cardiac rehab referral in one month after repeat echo

## 2020-06-26 NOTE — HISTORY OF PRESENT ILLNESS
[FreeTextEntry1] : Presents feeling much better with improved color and less dyspnea but he still does have GOLDMAN . No significant palpitations,syncope or chest pain\par \par No hematuria, rectal bleeding or significant bruising\par \par INR today in office is 2.6   and his target INR is 2.5 to 3.0 \par \par Walking daily and really cleaned up his diet as his adult daughter explains \par \par \par

## 2020-06-26 NOTE — PHYSICAL EXAM
[General Appearance - Well Developed] : well developed [Normal Appearance] : normal appearance [General Appearance - Well Nourished] : well nourished [Well Groomed] : well groomed [No Deformities] : no deformities [General Appearance - In No Acute Distress] : no acute distress [Eyelids - No Xanthelasma] : the eyelids demonstrated no xanthelasmas [Normal Conjunctiva] : the conjunctiva exhibited no abnormalities [No Oral Cyanosis] : no oral cyanosis [Normal Jugular Venous A Waves Present] : normal jugular venous A waves present [Normal Jugular Venous V Waves Present] : normal jugular venous V waves present [No Jugular Venous Knight A Waves] : no jugular venous knight A waves [Exaggerated Use Of Accessory Muscles For Inspiration] : no accessory muscle use [Respiration, Rhythm And Depth] : normal respiratory rhythm and effort [Auscultation Breath Sounds / Voice Sounds] : lungs were clear to auscultation bilaterally [Heart Rate And Rhythm] : heart rate and rhythm were normal [Heart Sounds] : normal S1 and S2 [Murmurs] : no murmurs present [Bowel Sounds] : normal bowel sounds [Edema] : no peripheral edema present [Abdomen Soft] : soft [Abdomen Tenderness] : non-tender [Abdomen Mass (___ Cm)] : no abdominal mass palpated [FreeTextEntry1] : protuberant abdomen  [Nail Clubbing] : no clubbing of the fingernails [Cyanosis, Localized] : no localized cyanosis [Gait - Sufficient For Exercise Testing] : the gait was sufficient for exercise testing [Abnormal Walk] : normal gait [Petechial Hemorrhages (___cm)] : no petechial hemorrhages [Skin Turgor] : normal skin turgor [] : no rash [Skin Color & Pigmentation] : normal skin color and pigmentation [No Venous Stasis] : no venous stasis [Skin Lesions] : no skin lesions [No Skin Ulcers] : no skin ulcer [No Xanthoma] : no  xanthoma was observed [Affect] : the affect was normal [Impaired Insight] : insight and judgment were intact [Oriented To Time, Place, And Person] : oriented to person, place, and time [No Anxiety] : not feeling anxious [Mood] : the mood was normal

## 2020-07-02 DIAGNOSIS — I25.84 CORONARY ATHEROSCLEROSIS DUE TO CALCIFIED CORONARY LESION: ICD-10-CM

## 2020-07-02 DIAGNOSIS — E78.5 HYPERLIPIDEMIA, UNSPECIFIED: ICD-10-CM

## 2020-07-02 DIAGNOSIS — I42.9 CARDIOMYOPATHY, UNSPECIFIED: ICD-10-CM

## 2020-07-02 DIAGNOSIS — I27.20 PULMONARY HYPERTENSION, UNSPECIFIED: ICD-10-CM

## 2020-07-02 DIAGNOSIS — I51.3 INTRACARDIAC THROMBOSIS, NOT ELSEWHERE CLASSIFIED: ICD-10-CM

## 2020-07-02 DIAGNOSIS — I21.4 NON-ST ELEVATION (NSTEMI) MYOCARDIAL INFARCTION: ICD-10-CM

## 2020-07-02 DIAGNOSIS — I25.110 ATHEROSCLEROTIC HEART DISEASE OF NATIVE CORONARY ARTERY WITH UNSTABLE ANGINA PECTORIS: ICD-10-CM

## 2020-07-02 DIAGNOSIS — Z79.82 LONG TERM (CURRENT) USE OF ASPIRIN: ICD-10-CM

## 2020-07-02 DIAGNOSIS — D72.829 ELEVATED WHITE BLOOD CELL COUNT, UNSPECIFIED: ICD-10-CM

## 2020-07-02 DIAGNOSIS — I50.21 ACUTE SYSTOLIC (CONGESTIVE) HEART FAILURE: ICD-10-CM

## 2020-07-02 DIAGNOSIS — I11.0 HYPERTENSIVE HEART DISEASE WITH HEART FAILURE: ICD-10-CM

## 2020-07-17 LAB
INR PPP: 1.8
INR PPP: 2.6 RATIO
POCT-PROTHROMBIN TIME: 31 SECS
PT BLD: 17.9

## 2020-07-21 ENCOUNTER — LABORATORY RESULT (OUTPATIENT)
Age: 70
End: 2020-07-21

## 2020-07-21 ENCOUNTER — APPOINTMENT (OUTPATIENT)
Dept: HEART AND VASCULAR | Facility: CLINIC | Age: 70
End: 2020-07-21
Payer: MEDICARE

## 2020-07-21 ENCOUNTER — APPOINTMENT (OUTPATIENT)
Dept: HEART AND VASCULAR | Facility: CLINIC | Age: 70
End: 2020-07-21

## 2020-07-21 VITALS
SYSTOLIC BLOOD PRESSURE: 122 MMHG | WEIGHT: 191 LBS | HEART RATE: 82 BPM | HEIGHT: 68.5 IN | RESPIRATION RATE: 12 BRPM | BODY MASS INDEX: 28.62 KG/M2 | OXYGEN SATURATION: 97 % | DIASTOLIC BLOOD PRESSURE: 80 MMHG

## 2020-07-21 DIAGNOSIS — I21.02 ST ELEVATION (STEMI) MYOCARDIAL INFARCTION INVOLVING LEFT ANTERIOR DESCENDING CORONARY ARTERY: ICD-10-CM

## 2020-07-21 PROCEDURE — 99215 OFFICE O/P EST HI 40 MIN: CPT

## 2020-07-21 PROCEDURE — 36415 COLL VENOUS BLD VENIPUNCTURE: CPT

## 2020-07-21 PROCEDURE — 93306 TTE W/DOPPLER COMPLETE: CPT

## 2020-07-23 LAB
ALBUMIN SERPL ELPH-MCNC: 4.7 G/DL
ALP BLD-CCNC: 96 U/L
ALT SERPL-CCNC: 100 U/L
ANION GAP SERPL CALC-SCNC: 14 MMOL/L
AST SERPL-CCNC: 33 U/L
BASOPHILS # BLD AUTO: 0.08 K/UL
BASOPHILS NFR BLD AUTO: 0.9 %
BILIRUB SERPL-MCNC: 0.4 MG/DL
BUN SERPL-MCNC: 29 MG/DL
CALCIUM SERPL-MCNC: 10.4 MG/DL
CHLORIDE SERPL-SCNC: 105 MMOL/L
CHOLEST SERPL-MCNC: 96 MG/DL
CHOLEST/HDLC SERPL: 3 RATIO
CO2 SERPL-SCNC: 21 MMOL/L
CREAT SERPL-MCNC: 1.16 MG/DL
EOSINOPHIL # BLD AUTO: 0.22 K/UL
EOSINOPHIL NFR BLD AUTO: 2.4 %
ESTIMATED AVERAGE GLUCOSE: 114 MG/DL
HBA1C MFR BLD HPLC: 5.6 %
HCT VFR BLD CALC: 50.4 %
HDLC SERPL-MCNC: 32 MG/DL
HGB BLD-MCNC: 15.5 G/DL
IMM GRANULOCYTES NFR BLD AUTO: 0.2 %
LDLC SERPL CALC-MCNC: 48 MG/DL
LYMPHOCYTES # BLD AUTO: 1.6 K/UL
LYMPHOCYTES NFR BLD AUTO: 17.2 %
MAN DIFF?: NORMAL
MCHC RBC-ENTMCNC: 28.7 PG
MCHC RBC-ENTMCNC: 30.8 GM/DL
MCV RBC AUTO: 93.3 FL
MONOCYTES # BLD AUTO: 0.6 K/UL
MONOCYTES NFR BLD AUTO: 6.5 %
NEUTROPHILS # BLD AUTO: 6.77 K/UL
NEUTROPHILS NFR BLD AUTO: 72.8 %
NT-PROBNP SERPL-MCNC: 1883 PG/ML
PLATELET # BLD AUTO: 876 K/UL
POTASSIUM SERPL-SCNC: 5 MMOL/L
PROT SERPL-MCNC: 7 G/DL
RBC # BLD: 5.4 M/UL
RBC # FLD: 15.6 %
SARS-COV-2 IGG SERPL IA-ACNC: <0.1 INDEX
SARS-COV-2 IGG SERPL QL IA: NEGATIVE
SODIUM SERPL-SCNC: 140 MMOL/L
TRIGL SERPL-MCNC: 78 MG/DL
WBC # FLD AUTO: 9.29 K/UL

## 2020-07-24 LAB — JAK2 GENE MUT ANL BLD/T: NORMAL

## 2020-07-25 LAB — GENE XXX MUT ANL BLD/T: NORMAL

## 2020-07-28 PROBLEM — I21.02 ST ELEVATION MYOCARDIAL INFARCTION INVOLVING LEFT ANTERIOR DESCENDING (LAD) CORONARY ARTERY: Status: RESOLVED | Noted: 2020-07-28 | Resolved: 2020-07-28

## 2020-07-28 NOTE — REASON FOR VISIT
[Follow-Up - Clinic] : a clinic follow-up of [Anticoagulation] : anticoagulation [Coronary Artery Disease] : coronary artery disease [Dyspnea] : dyspnea [Prior Myocardial Infarction] : a prior myocardial infarction [Family Member] : family member [FreeTextEntry1] : 70 year old Richard male with hypertension  s/p recent anterior wall motion complicated by large LV thrombus was discharged June 22nd after successful PTCA/VENESSA of p LAD and prox and mid RCA. \par \par \par He is on triple therapy : warfarin, aspirin 81mg and clopidogrel. \par \par \par   . \par \par

## 2020-07-28 NOTE — REVIEW OF SYSTEMS
[Dyspnea on exertion] : dyspnea during exertion [Anxiety] : anxiety [Under Stress] : under stress [Negative] : Heme/Lymph

## 2020-07-28 NOTE — HISTORY OF PRESENT ILLNESS
[FreeTextEntry1] : Reports improvement in dyspnea \par \par Denies bleeding from any orifice \par \par Needs INR check\par \par Told that he has very high platelet count and was referred to a hematologist  but the physician was not  answering his calls to make appointment \par \par Reports compliance with all medications and  diet \par \par Requires echocardiographic follow up of large , mobile LV apical thrombus

## 2020-07-28 NOTE — ASSESSMENT
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

## 2020-07-28 NOTE — DISCUSSION/SUMMARY
[INR Therapeutic] : the patient's INR is therapeutic [Outpatient Evaluation] : outpatient evaluation [Systolic Heart Failure] : systolic heart failure [BNP] : B-type natriuretic peptide [Echocardiogram] : echocardiogram [Coronary Artery Disease] : coronary artery disease [Stable] : stable [None] : none [___ Month(s)] : [unfilled] month(s) [With Me] : with me [FreeTextEntry1] : venipuncture performed with BNP, JAK2 , CALR testing, Covid 19 antibody, lipids, etc \par \par medications reconciled\par \par referral provided for hematology evaluation of thrombocytosis \par \par target INR 2.0-2.5\par \par results of echocardiogram reviewed with patient : size of LV apical thrombus markedly reduced , LVEF 25%, apical dyskinesis \par \par target LDL less than 70 mg/dL \par \par Discussed eventual need for ICD placement once LV thrombus organizes and LVEF is assessed beyond 90 days post intervention \par \par Will consider switching lisinopril to Entresto  if BNP remains elevated

## 2020-07-28 NOTE — PHYSICAL EXAM
[General Appearance - Well Developed] : well developed [Normal Appearance] : normal appearance [Well Groomed] : well groomed [General Appearance - Well Nourished] : well nourished [No Deformities] : no deformities [General Appearance - In No Acute Distress] : no acute distress [Normal Conjunctiva] : the conjunctiva exhibited no abnormalities [Eyelids - No Xanthelasma] : the eyelids demonstrated no xanthelasmas [No Oral Cyanosis] : no oral cyanosis [Normal Jugular Venous A Waves Present] : normal jugular venous A waves present [Normal Jugular Venous V Waves Present] : normal jugular venous V waves present [No Jugular Venous Knight A Waves] : no jugular venous knight A waves [Respiration, Rhythm And Depth] : normal respiratory rhythm and effort [Exaggerated Use Of Accessory Muscles For Inspiration] : no accessory muscle use [Auscultation Breath Sounds / Voice Sounds] : lungs were clear to auscultation bilaterally [Heart Rate And Rhythm] : heart rate and rhythm were normal [Heart Sounds] : normal S1 and S2 [Murmurs] : no murmurs present [Edema] : no peripheral edema present [Bowel Sounds] : normal bowel sounds [Abdomen Soft] : soft [Abdomen Tenderness] : non-tender [Abdomen Mass (___ Cm)] : no abdominal mass palpated [Abnormal Walk] : normal gait [Gait - Sufficient For Exercise Testing] : the gait was sufficient for exercise testing [Nail Clubbing] : no clubbing of the fingernails [Cyanosis, Localized] : no localized cyanosis [Petechial Hemorrhages (___cm)] : no petechial hemorrhages [Skin Color & Pigmentation] : normal skin color and pigmentation [FreeTextEntry1] : normal capillary refill  [Skin Turgor] : normal skin turgor [] : no rash [No Venous Stasis] : no venous stasis [No Skin Ulcers] : no skin ulcer [Skin Lesions] : no skin lesions [No Xanthoma] : no  xanthoma was observed [Oriented To Time, Place, And Person] : oriented to person, place, and time [Impaired Insight] : insight and judgment were intact [Affect] : the affect was normal [No Anxiety] : not feeling anxious [Mood] : the mood was normal

## 2020-08-06 ENCOUNTER — APPOINTMENT (OUTPATIENT)
Dept: HEMATOLOGY ONCOLOGY | Facility: CLINIC | Age: 70
End: 2020-08-06
Payer: MEDICARE

## 2020-08-06 PROCEDURE — 99204 OFFICE O/P NEW MOD 45 MIN: CPT | Mod: 95

## 2020-08-06 NOTE — ASSESSMENT
[FreeTextEntry1] : Long discussion with patient and son... Since Ramez 2 was positive patient has a myeloproliferative disorder called essential thrombocytosis... His acute MI was complicated by mural thrombus, probably related to his essential thrombocytosis...\par \par In view of the thrombosis patient will be started on Hydrea 500 mg daily... He will have a CBC done in 1 week at his local lab in [place]... Information given to patient's son.\par \par I discussed case in detail with Dr. Woods... Patient had an eluting stent placed, he therefore needs to remain on aspirin and Plavix for at least 3 months, as well as Coumadin aiming at an INR of 1.8-2.3.\par \par I will order the PT to be done at the time of the weekly CBC, and Dr. Woods will adjust the Coumadin as needed.\par \par Follow-up with me in 2 to 3 weeks.

## 2020-08-06 NOTE — HISTORY OF PRESENT ILLNESS
[Home] : at home, [unfilled] , at the time of the visit. [Medical Office: ([facility])___] : at the medical office located in  [Verbal consent obtained from patient] : the patient, [unfilled] [Family Member] : family member [de-identified] : 70 years old male past medical history significant for hypertension... Patient was noted to have an acute myocardial infarction complicated by a thrombosis during [place] time... He was also found to have increased platelet count and Ramez 2 positivity consistent with essential thrombocytosis... He was sent in for hematological consultation

## 2020-08-06 NOTE — CONSULT LETTER

## 2020-08-07 ENCOUNTER — TRANSCRIPTION ENCOUNTER (OUTPATIENT)
Age: 70
End: 2020-08-07

## 2020-08-08 ENCOUNTER — TRANSCRIPTION ENCOUNTER (OUTPATIENT)
Age: 70
End: 2020-08-08

## 2020-08-10 ENCOUNTER — TRANSCRIPTION ENCOUNTER (OUTPATIENT)
Age: 70
End: 2020-08-10

## 2020-08-11 ENCOUNTER — TRANSCRIPTION ENCOUNTER (OUTPATIENT)
Age: 70
End: 2020-08-11

## 2020-08-13 ENCOUNTER — TRANSCRIPTION ENCOUNTER (OUTPATIENT)
Age: 70
End: 2020-08-13

## 2020-08-16 ENCOUNTER — TRANSCRIPTION ENCOUNTER (OUTPATIENT)
Age: 70
End: 2020-08-16

## 2020-08-17 ENCOUNTER — TRANSCRIPTION ENCOUNTER (OUTPATIENT)
Age: 70
End: 2020-08-17

## 2020-08-18 ENCOUNTER — APPOINTMENT (OUTPATIENT)
Dept: HEMATOLOGY ONCOLOGY | Facility: CLINIC | Age: 70
End: 2020-08-18
Payer: MEDICARE

## 2020-08-18 VITALS
BODY MASS INDEX: 28.74 KG/M2 | WEIGHT: 191.8 LBS | DIASTOLIC BLOOD PRESSURE: 83 MMHG | HEIGHT: 68.5 IN | TEMPERATURE: 98 F | OXYGEN SATURATION: 97 % | HEART RATE: 80 BPM | SYSTOLIC BLOOD PRESSURE: 129 MMHG

## 2020-08-18 PROCEDURE — 99214 OFFICE O/P EST MOD 30 MIN: CPT

## 2020-08-18 NOTE — CONSULT LETTER
[Dear  ___] : Dear  [unfilled], [Consult Letter:] : I had the pleasure of evaluating your patient, [unfilled]. [Sincerely,] : Sincerely, [Consult Closing:] : Thank you very much for allowing me to participate in the care of this patient.  If you have any questions, please do not hesitate to contact me. [Please see my note below.] : Please see my note below. [FreeTextEntry3] : Argenis Cervantes MD\par

## 2020-08-18 NOTE — ASSESSMENT
[FreeTextEntry1] : Long discussion with patient and son... Since Ramez 2 was positive patient has a myeloproliferative disorder called essential thrombocytosis... His acute MI was complicated by mural thrombus, probably related to his essential thrombocytosis...\par \par In view of the thrombosis patient will be started on Hydrea 500 mg daily... However he is platelet count remains high... He is therefore to start Hydrea at thousand milligram at bedtime, starting today and have a repeat CBC and PT in 1 week... Referral to the lab given.\par \par Discussed with patient and his son the need to go back on the Plavix as well as aspirin for a total of 3 months until there is epithelialization of the stents...\par \par Patient will see me in follow-up in 6 weeks.

## 2020-08-18 NOTE — HISTORY OF PRESENT ILLNESS
[de-identified] : 8/18/2020 patient returns for follow-up with his son... He has started on Hydrea 500 mg daily... He states he might be a little tired from it however he is willing to continue... Repeat CBC platelet count went up to 900,000... [de-identified] : 70 years old male past medical history significant for hypertension... Patient was noted to have an acute myocardial infarction complicated by a thrombosis during Cor[place] time... He was also found to have increased platelet count and Ramez 2 positivity consistent with essential thrombocytosis... He was sent in for hematological consultation [Family Member] : family member

## 2020-08-20 LAB
25(OH)D3 SERPL-MCNC: 30 NG/ML
ALBUMIN SERPL ELPH-MCNC: 4.4 G/DL
ALP BLD-CCNC: 115 U/L
ALT SERPL-CCNC: 83 U/L
ANION GAP SERPL CALC-SCNC: 15 MMOL/L
AST SERPL-CCNC: 36 U/L
BASOPHILS # BLD AUTO: 0.09 K/UL
BASOPHILS NFR BLD AUTO: 0.9 %
BILIRUB SERPL-MCNC: 0.3 MG/DL
BUN SERPL-MCNC: 29 MG/DL
CALCIUM SERPL-MCNC: 9.6 MG/DL
CHLORIDE SERPL-SCNC: 104 MMOL/L
CO2 SERPL-SCNC: 19 MMOL/L
CREAT SERPL-MCNC: 1.27 MG/DL
EOSINOPHIL # BLD AUTO: 0.4 K/UL
EOSINOPHIL NFR BLD AUTO: 3.9 %
ERYTHROCYTE [SEDIMENTATION RATE] IN BLOOD BY WESTERGREN METHOD: 17 MM/HR
GLUCOSE SERPL-MCNC: 94 MG/DL
HCT VFR BLD CALC: 48.4 %
HGB BLD-MCNC: 15.2 G/DL
IMM GRANULOCYTES NFR BLD AUTO: 0.5 %
INR PPP: 2.14 RATIO
LDH SERPL-CCNC: 266 U/L
LYMPHOCYTES # BLD AUTO: 1.99 K/UL
LYMPHOCYTES NFR BLD AUTO: 19.4 %
MAN DIFF?: NORMAL
MCHC RBC-ENTMCNC: 29 PG
MCHC RBC-ENTMCNC: 31.4 GM/DL
MCV RBC AUTO: 92.2 FL
MONOCYTES # BLD AUTO: 0.66 K/UL
MONOCYTES NFR BLD AUTO: 6.4 %
NEUTROPHILS # BLD AUTO: 7.07 K/UL
NEUTROPHILS NFR BLD AUTO: 68.9 %
PLATELET # BLD AUTO: 947 K/UL
POTASSIUM SERPL-SCNC: 4.8 MMOL/L
PROT SERPL-MCNC: 6.6 G/DL
PT BLD: 24.3 SEC
RBC # BLD: 5.25 M/UL
RBC # FLD: 14.9 %
SODIUM SERPL-SCNC: 139 MMOL/L
TSH SERPL-ACNC: 1.56 UIU/ML
VIT B12 SERPL-MCNC: 603 PG/ML
WBC # FLD AUTO: 10.26 K/UL

## 2020-08-25 ENCOUNTER — TRANSCRIPTION ENCOUNTER (OUTPATIENT)
Age: 70
End: 2020-08-25

## 2020-08-27 ENCOUNTER — TRANSCRIPTION ENCOUNTER (OUTPATIENT)
Age: 70
End: 2020-08-27

## 2020-08-31 ENCOUNTER — APPOINTMENT (OUTPATIENT)
Dept: HEART AND VASCULAR | Facility: CLINIC | Age: 70
End: 2020-08-31

## 2020-08-31 ENCOUNTER — APPOINTMENT (OUTPATIENT)
Dept: HEART AND VASCULAR | Facility: CLINIC | Age: 70
End: 2020-08-31
Payer: MEDICARE

## 2020-08-31 VITALS
TEMPERATURE: 97.3 F | SYSTOLIC BLOOD PRESSURE: 120 MMHG | DIASTOLIC BLOOD PRESSURE: 80 MMHG | WEIGHT: 187 LBS | HEART RATE: 74 BPM | BODY MASS INDEX: 28.02 KG/M2 | OXYGEN SATURATION: 96 % | HEIGHT: 68.5 IN | RESPIRATION RATE: 14 BRPM

## 2020-08-31 PROCEDURE — 93306 TTE W/DOPPLER COMPLETE: CPT

## 2020-08-31 PROCEDURE — 36415 COLL VENOUS BLD VENIPUNCTURE: CPT

## 2020-08-31 PROCEDURE — 93000 ELECTROCARDIOGRAM COMPLETE: CPT

## 2020-08-31 PROCEDURE — 99214 OFFICE O/P EST MOD 30 MIN: CPT

## 2020-09-01 LAB
BASOPHILS # BLD AUTO: 0.08 K/UL
BASOPHILS NFR BLD AUTO: 1 %
CHOLEST SERPL-MCNC: 129 MG/DL
CHOLEST/HDLC SERPL: 3.8 RATIO
EOSINOPHIL # BLD AUTO: 0.16 K/UL
EOSINOPHIL NFR BLD AUTO: 2 %
HCT VFR BLD CALC: 49 %
HDLC SERPL-MCNC: 34 MG/DL
HGB BLD-MCNC: 15.2 G/DL
IMM GRANULOCYTES NFR BLD AUTO: 0.3 %
INR PPP: 2.09 RATIO
LDLC SERPL CALC-MCNC: 75 MG/DL
LYMPHOCYTES # BLD AUTO: 1.35 K/UL
LYMPHOCYTES NFR BLD AUTO: 17 %
MAN DIFF?: NORMAL
MCHC RBC-ENTMCNC: 29.9 PG
MCHC RBC-ENTMCNC: 31 GM/DL
MCV RBC AUTO: 96.3 FL
MONOCYTES # BLD AUTO: 0.74 K/UL
MONOCYTES NFR BLD AUTO: 9.3 %
NEUTROPHILS # BLD AUTO: 5.57 K/UL
NEUTROPHILS NFR BLD AUTO: 70.4 %
NT-PROBNP SERPL-MCNC: 1449 PG/ML
PLATELET # BLD AUTO: 738 K/UL
PT BLD: 23.8 SEC
RBC # BLD: 5.09 M/UL
RBC # FLD: 17.6 %
TRIGL SERPL-MCNC: 102 MG/DL
WBC # FLD AUTO: 7.92 K/UL

## 2020-09-04 LAB
BASOPHILS # BLD AUTO: 0.08 K/UL
BASOPHILS NFR BLD AUTO: 0.8 %
EOSINOPHIL # BLD AUTO: 0.31 K/UL
EOSINOPHIL NFR BLD AUTO: 3.1 %
HCT VFR BLD CALC: 46.1 %
HGB BLD-MCNC: 14.1 G/DL
IMM GRANULOCYTES NFR BLD AUTO: 0.2 %
INR PPP: 2.23 RATIO
LYMPHOCYTES # BLD AUTO: 2.36 K/UL
LYMPHOCYTES NFR BLD AUTO: 23.8 %
MAN DIFF?: NORMAL
MCHC RBC-ENTMCNC: 29 PG
MCHC RBC-ENTMCNC: 30.6 GM/DL
MCV RBC AUTO: 94.9 FL
MONOCYTES # BLD AUTO: 0.7 K/UL
MONOCYTES NFR BLD AUTO: 7.1 %
NEUTROPHILS # BLD AUTO: 6.43 K/UL
NEUTROPHILS NFR BLD AUTO: 65 %
PLATELET # BLD AUTO: 885 K/UL
PT BLD: 25.3 SEC
RBC # BLD: 4.86 M/UL
RBC # FLD: 16.1 %
WBC # FLD AUTO: 9.9 K/UL

## 2020-09-07 NOTE — DISCUSSION/SUMMARY
[Coronary Artery Disease] : coronary artery disease [None] : There are no changes in medication management [Systolic Heart Failure] : systolic heart failure [Stable] : stable [Outpatient Evaluation] : outpatient evaluation [BNP] : B-type natriuretic peptide [Echocardiogram] : echocardiogram [Hyperlipidemia] : hyperlipidemia [Lipids Test Panel] : a fasting lipid profile [___ Month(s)] : [unfilled] month(s) [Known CAD] : known coronary artery disease [With Me] : with me [FreeTextEntry1] : echocardiogram , limited study to assess LVEF and LV thrombus status ,  LVEF ~35%   , LV thrombus organized and significantly smaller than previous \par \par venipuncture with INR,  BNP,  etc \par \par monitor INR q 2 weeks \par \par refer to  EP  for evaluation : possible need for ICD implant \par \par hematology follow up  and management of ANGELINA 2 thrombocytosis

## 2020-09-07 NOTE — REASON FOR VISIT
[Follow-Up - Clinic] : a clinic follow-up of [Coronary Artery Disease] : coronary artery disease [Hyperlipidemia] : hyperlipidemia [FreeTextEntry1] : 70 year old Richard male with hypertension  s/p recent anterior wall motion complicated by large LV thrombus was discharged June 22nd after successful PTCA/VENESSA of p LAD and prox and mid RCA. \par \par \par He is on triple therapy : warfarin, aspirin 81mg and clopidogrel. \par \par \par   . \par \par  [Prior Myocardial Infarction] : a prior myocardial infarction [Family Member] : family member

## 2020-09-07 NOTE — PHYSICAL EXAM
[Normal Appearance] : normal appearance [General Appearance - Well Developed] : well developed [Well Groomed] : well groomed [No Deformities] : no deformities [General Appearance - Well Nourished] : well nourished [General Appearance - In No Acute Distress] : no acute distress [Normal Conjunctiva] : the conjunctiva exhibited no abnormalities [Eyelids - No Xanthelasma] : the eyelids demonstrated no xanthelasmas [No Oral Cyanosis] : no oral cyanosis [Normal Jugular Venous A Waves Present] : normal jugular venous A waves present [Normal Jugular Venous V Waves Present] : normal jugular venous V waves present [No Jugular Venous Knight A Waves] : no jugular venous knight A waves [Respiration, Rhythm And Depth] : normal respiratory rhythm and effort [Exaggerated Use Of Accessory Muscles For Inspiration] : no accessory muscle use [Auscultation Breath Sounds / Voice Sounds] : lungs were clear to auscultation bilaterally [Heart Rate And Rhythm] : heart rate and rhythm were normal [Heart Sounds] : normal S1 and S2 [Murmurs] : no murmurs present [Edema] : no peripheral edema present [Bowel Sounds] : normal bowel sounds [Abdomen Soft] : soft [Abdomen Mass (___ Cm)] : no abdominal mass palpated [Abdomen Tenderness] : non-tender [Abnormal Walk] : normal gait [Gait - Sufficient For Exercise Testing] : the gait was sufficient for exercise testing [Nail Clubbing] : no clubbing of the fingernails [Cyanosis, Localized] : no localized cyanosis [Petechial Hemorrhages (___cm)] : no petechial hemorrhages [FreeTextEntry1] : normal capillary refill  [Skin Color & Pigmentation] : normal skin color and pigmentation [Skin Turgor] : normal skin turgor [] : no rash [No Venous Stasis] : no venous stasis [No Xanthoma] : no  xanthoma was observed [Skin Lesions] : no skin lesions [No Skin Ulcers] : no skin ulcer [Oriented To Time, Place, And Person] : oriented to person, place, and time [Affect] : the affect was normal [Mood] : the mood was normal [Impaired Insight] : insight and judgment were intact [No Anxiety] : not feeling anxious

## 2020-09-07 NOTE — HISTORY OF PRESENT ILLNESS
[FreeTextEntry1] : On hydrea 1,000 mg bid for  ANGELINA 2 thrombocytosis \par \par therapeutic INR  2.23\par \par Subconjunctival hemorrhages  recently \par \par EKG shows NSR 71 bpm with LV aneurysm  pattern  with septal MI pattern . No ectopy, ischemia or LVH. QTc 417 msec\par \par Patient denies bleeding, chest pain, dyspnea, palpitations, dizziness or syncope \par \par Walks daily ~ 12 thousand steps  without difficulties

## 2020-09-07 NOTE — REVIEW OF SYSTEMS
[Anxiety] : anxiety [Dyspnea on exertion] : dyspnea during exertion [Under Stress] : under stress [Negative] : Endocrine

## 2020-09-07 NOTE — ASSESSMENT
[FreeTextEntry1] : stable hemodynamics\par \par ANGELINA 2 thrombocytosis \par \par LV mural thrombus \par \par Ischemic cardiomyopathy

## 2020-09-07 NOTE — PHYSICAL EXAM
[General Appearance - Well Developed] : well developed [Normal Appearance] : normal appearance [General Appearance - Well Nourished] : well nourished [No Deformities] : no deformities [Well Groomed] : well groomed [General Appearance - In No Acute Distress] : no acute distress [Normal Conjunctiva] : the conjunctiva exhibited no abnormalities [Eyelids - No Xanthelasma] : the eyelids demonstrated no xanthelasmas [No Oral Cyanosis] : no oral cyanosis [Normal Jugular Venous A Waves Present] : normal jugular venous A waves present [No Jugular Venous Knight A Waves] : no jugular venous knight A waves [Normal Jugular Venous V Waves Present] : normal jugular venous V waves present [Respiration, Rhythm And Depth] : normal respiratory rhythm and effort [Exaggerated Use Of Accessory Muscles For Inspiration] : no accessory muscle use [Auscultation Breath Sounds / Voice Sounds] : lungs were clear to auscultation bilaterally [Heart Rate And Rhythm] : heart rate and rhythm were normal [Heart Sounds] : normal S1 and S2 [Murmurs] : no murmurs present [Edema] : no peripheral edema present [Abdomen Soft] : soft [Bowel Sounds] : normal bowel sounds [Abdomen Tenderness] : non-tender [Abdomen Mass (___ Cm)] : no abdominal mass palpated [Abnormal Walk] : normal gait [Gait - Sufficient For Exercise Testing] : the gait was sufficient for exercise testing [Nail Clubbing] : no clubbing of the fingernails [Cyanosis, Localized] : no localized cyanosis [Petechial Hemorrhages (___cm)] : no petechial hemorrhages [FreeTextEntry1] : normal capillary refill  [Skin Color & Pigmentation] : normal skin color and pigmentation [Skin Turgor] : normal skin turgor [] : no rash [No Venous Stasis] : no venous stasis [Skin Lesions] : no skin lesions [No Skin Ulcers] : no skin ulcer [No Xanthoma] : no  xanthoma was observed [Oriented To Time, Place, And Person] : oriented to person, place, and time [Impaired Insight] : insight and judgment were intact [Mood] : the mood was normal [Affect] : the affect was normal [No Anxiety] : not feeling anxious

## 2020-09-07 NOTE — DISCUSSION/SUMMARY
[Coronary Artery Disease] : coronary artery disease [None] : There are no changes in medication management [Systolic Heart Failure] : systolic heart failure [Outpatient Evaluation] : outpatient evaluation [Stable] : stable [Echocardiogram] : echocardiogram [Hyperlipidemia] : hyperlipidemia [BNP] : B-type natriuretic peptide [Lipids Test Panel] : a fasting lipid profile [Known CAD] : known coronary artery disease [___ Month(s)] : [unfilled] month(s) [With Me] : with me [FreeTextEntry1] : echocardiogram , limited study to assess LVEF and LV thrombus status ,  LVEF ~35%   , LV thrombus organized and significantly smaller than previous \par \par venipuncture with INR,  BNP,  etc \par \par monitor INR q 2 weeks \par \par refer to  EP  for evaluation : possible need for ICD implant \par \par hematology follow up  and management of ANGELINA 2 thrombocytosis

## 2020-09-07 NOTE — REVIEW OF SYSTEMS
[Dyspnea on exertion] : dyspnea during exertion [Anxiety] : anxiety [Under Stress] : under stress [Negative] : Endocrine

## 2020-09-15 ENCOUNTER — TRANSCRIPTION ENCOUNTER (OUTPATIENT)
Age: 70
End: 2020-09-15

## 2020-09-19 ENCOUNTER — RX RENEWAL (OUTPATIENT)
Age: 70
End: 2020-09-19

## 2020-09-22 ENCOUNTER — TRANSCRIPTION ENCOUNTER (OUTPATIENT)
Age: 70
End: 2020-09-22

## 2020-09-23 ENCOUNTER — TRANSCRIPTION ENCOUNTER (OUTPATIENT)
Age: 70
End: 2020-09-23

## 2020-09-23 LAB
BASOPHILS # BLD AUTO: 0.08 K/UL
BASOPHILS NFR BLD AUTO: 0.9 %
EOSINOPHIL # BLD AUTO: 0.21 K/UL
EOSINOPHIL NFR BLD AUTO: 2.3 %
HCT VFR BLD CALC: 47 %
HGB BLD-MCNC: 14.6 G/DL
IMM GRANULOCYTES NFR BLD AUTO: 0.3 %
INR PPP: 2.65 RATIO
LYMPHOCYTES # BLD AUTO: 1.57 K/UL
LYMPHOCYTES NFR BLD AUTO: 16.9 %
MAN DIFF?: NORMAL
MCHC RBC-ENTMCNC: 29.7 PG
MCHC RBC-ENTMCNC: 31.1 GM/DL
MCV RBC AUTO: 95.7 FL
MONOCYTES # BLD AUTO: 0.82 K/UL
MONOCYTES NFR BLD AUTO: 8.8 %
NEUTROPHILS # BLD AUTO: 6.57 K/UL
NEUTROPHILS NFR BLD AUTO: 70.8 %
PLATELET # BLD AUTO: 821 K/UL
PT BLD: 29.9 SEC
RBC # BLD: 4.91 M/UL
RBC # FLD: 18.6 %
WBC # FLD AUTO: 9.28 K/UL

## 2020-09-29 ENCOUNTER — APPOINTMENT (OUTPATIENT)
Dept: HEMATOLOGY ONCOLOGY | Facility: CLINIC | Age: 70
End: 2020-09-29
Payer: MEDICARE

## 2020-09-29 ENCOUNTER — LABORATORY RESULT (OUTPATIENT)
Age: 70
End: 2020-09-29

## 2020-09-29 VITALS
HEIGHT: 68.5 IN | BODY MASS INDEX: 28.08 KG/M2 | HEART RATE: 72 BPM | SYSTOLIC BLOOD PRESSURE: 131 MMHG | DIASTOLIC BLOOD PRESSURE: 81 MMHG | WEIGHT: 187.4 LBS | TEMPERATURE: 97.3 F | OXYGEN SATURATION: 98 %

## 2020-09-29 LAB
BASOPHILS # BLD AUTO: 0.09 K/UL
BASOPHILS NFR BLD AUTO: 1 %
EOSINOPHIL # BLD AUTO: 0 K/UL
EOSINOPHIL NFR BLD AUTO: 0 %
HCT VFR BLD CALC: 46.6 %
HGB BLD-MCNC: 15.1 G/DL
LYMPHOCYTES # BLD AUTO: 1.96 K/UL
LYMPHOCYTES NFR BLD AUTO: 23 %
MAN DIFF?: NORMAL
MCHC RBC-ENTMCNC: 30.3 PG
MCHC RBC-ENTMCNC: 32.4 GM/DL
MCV RBC AUTO: 93.4 FL
MONOCYTES # BLD AUTO: 0.68 K/UL
MONOCYTES NFR BLD AUTO: 8 %
NEUTROPHILS # BLD AUTO: 5.78 K/UL
NEUTROPHILS NFR BLD AUTO: 68 %
PLATELET # BLD AUTO: 579 K/UL
RBC # BLD: 4.99 M/UL
RBC # FLD: 20.1 %
WBC # FLD AUTO: 8.5 K/UL

## 2020-09-29 PROCEDURE — 36415 COLL VENOUS BLD VENIPUNCTURE: CPT

## 2020-09-29 PROCEDURE — 99214 OFFICE O/P EST MOD 30 MIN: CPT | Mod: 25

## 2020-09-30 LAB
INR PPP: 1.84 RATIO
PT BLD: 21.1 SEC

## 2020-10-01 LAB
ALBUMIN MFR SERPL ELPH: 55.9 %
ALBUMIN SERPL-MCNC: 3.9 G/DL
ALBUMIN/GLOB SERPL: 1.3 RATIO
ALPHA1 GLOB MFR SERPL ELPH: 4.4 %
ALPHA1 GLOB SERPL ELPH-MCNC: 0.3 G/DL
ALPHA2 GLOB MFR SERPL ELPH: 12.3 %
ALPHA2 GLOB SERPL ELPH-MCNC: 0.9 G/DL
B-GLOBULIN MFR SERPL ELPH: 13.5 %
B-GLOBULIN SERPL ELPH-MCNC: 0.9 G/DL
DEPRECATED KAPPA LC FREE/LAMBDA SER: 1.13 RATIO
GAMMA GLOB FLD ELPH-MCNC: 1 G/DL
GAMMA GLOB MFR SERPL ELPH: 13.9 %
IGA SER QL IEP: 417 MG/DL
IGG SER QL IEP: 957 MG/DL
IGM SER QL IEP: 86 MG/DL
INTERPRETATION SERPL IEP-IMP: NORMAL
KAPPA LC CSF-MCNC: 1.75 MG/DL
KAPPA LC SERPL-MCNC: 1.97 MG/DL
M PROTEIN SPEC IFE-MCNC: NORMAL
PROT SERPL-MCNC: 7 G/DL
PROT SERPL-MCNC: 7 G/DL

## 2020-10-14 ENCOUNTER — TRANSCRIPTION ENCOUNTER (OUTPATIENT)
Age: 70
End: 2020-10-14

## 2020-10-14 LAB
BASOPHILS # BLD AUTO: 0.04 K/UL
BASOPHILS NFR BLD AUTO: 0.5 %
EOSINOPHIL # BLD AUTO: 0.12 K/UL
EOSINOPHIL NFR BLD AUTO: 1.6 %
HCT VFR BLD CALC: 45 %
HGB BLD-MCNC: 14.4 G/DL
IMM GRANULOCYTES NFR BLD AUTO: 0.4 %
INR PPP: 1.96 RATIO
LYMPHOCYTES # BLD AUTO: 1.53 K/UL
LYMPHOCYTES NFR BLD AUTO: 20.3 %
MAN DIFF?: NORMAL
MCHC RBC-ENTMCNC: 30.8 PG
MCHC RBC-ENTMCNC: 32 GM/DL
MCV RBC AUTO: 96.4 FL
MONOCYTES # BLD AUTO: 0.6 K/UL
MONOCYTES NFR BLD AUTO: 8 %
NEUTROPHILS # BLD AUTO: 5.21 K/UL
NEUTROPHILS NFR BLD AUTO: 69.2 %
PLATELET # BLD AUTO: 553 K/UL
PT BLD: 22.4 SEC
RBC # BLD: 4.67 M/UL
RBC # FLD: 21.3 %
WBC # FLD AUTO: 7.53 K/UL

## 2020-10-27 ENCOUNTER — LABORATORY RESULT (OUTPATIENT)
Age: 70
End: 2020-10-27

## 2020-10-29 ENCOUNTER — TRANSCRIPTION ENCOUNTER (OUTPATIENT)
Age: 70
End: 2020-10-29

## 2020-10-30 ENCOUNTER — TRANSCRIPTION ENCOUNTER (OUTPATIENT)
Age: 70
End: 2020-10-30

## 2020-10-30 NOTE — HISTORY OF PRESENT ILLNESS
[de-identified] : 70 years old male past medical history significant for hypertension... Patient was noted to have an acute myocardial infarction complicated by a thrombosis during [place] time... He was also found to have increased platelet count and Ramez 2 positivity consistent with essential thrombocytosis... He was sent in for hematological consultation [de-identified] : 8/18/2020 patient returns for follow-up with his son... He has started on Hydrea 500 mg daily... He states he might be a little tired from it however he is willing to continue... Repeat CBC platelet count went up to 900,000...\par \par 9/29/2020 patient returns to the office for follow-up for his essential thrombocytosis... He states he has been taking the Hydrea at thousand milligram, and was actually very upset that his last CBC on 915 the platelet count went up to 800,000... I reassured patient that we are trying to get his platelet count down gradually... and if increase the Hydrea dose to fast he might develop anemia, which will worsen his coronary artery disease

## 2020-10-30 NOTE — CONSULT LETTER
[Dear  ___] : Dear  [unfilled], [Consult Letter:] : I had the pleasure of evaluating your patient, [unfilled]. [Please see my note below.] : Please see my note below. [Consult Closing:] : Thank you very much for allowing me to participate in the care of this patient.  If you have any questions, please do not hesitate to contact me. [Sincerely,] : Sincerely, [FreeTextEntry3] : Argenis Cervantes MD\par

## 2020-10-30 NOTE — ASSESSMENT
[FreeTextEntry1] : Long discussion with patient and son... Since Ramez 2 was positive patient has a myeloproliferative disorder called essential thrombocytosis... His acute MI was complicated by mural thrombus, probably related to his essential thrombocytosis...\par \par Repeat blood work done today...\par \par Patient and his son, that the goal is to have platelet count at around 500,000...\par \par  Discussed with patient and his son possibility of increasing Hydrea to 1500 on Tuesday and Friday if needed...\par \par

## 2020-10-31 ENCOUNTER — TRANSCRIPTION ENCOUNTER (OUTPATIENT)
Age: 70
End: 2020-10-31

## 2020-10-31 LAB
BASOPHILS # BLD AUTO: 0.06 K/UL
BASOPHILS NFR BLD AUTO: 0.7 %
EOSINOPHIL # BLD AUTO: 0.11 K/UL
EOSINOPHIL NFR BLD AUTO: 1.4 %
HCT VFR BLD CALC: 45.7 %
HGB BLD-MCNC: 14.5 G/DL
IMM GRANULOCYTES NFR BLD AUTO: 0.4 %
INR PPP: 2.98 RATIO
LYMPHOCYTES # BLD AUTO: 1.84 K/UL
LYMPHOCYTES NFR BLD AUTO: 22.8 %
MAN DIFF?: NORMAL
MCHC RBC-ENTMCNC: 31.7 GM/DL
MCHC RBC-ENTMCNC: 31.7 PG
MCV RBC AUTO: 100 FL
MONOCYTES # BLD AUTO: 0.58 K/UL
MONOCYTES NFR BLD AUTO: 7.2 %
NEUTROPHILS # BLD AUTO: 5.45 K/UL
NEUTROPHILS NFR BLD AUTO: 67.5 %
PLATELET # BLD AUTO: 538 K/UL
PT BLD: 33.4 SEC
RBC # BLD: 4.57 M/UL
RBC # FLD: 22.9 %
WBC # FLD AUTO: 8.07 K/UL

## 2020-11-02 ENCOUNTER — TRANSCRIPTION ENCOUNTER (OUTPATIENT)
Age: 70
End: 2020-11-02

## 2020-11-09 ENCOUNTER — LABORATORY RESULT (OUTPATIENT)
Age: 70
End: 2020-11-09

## 2020-11-10 ENCOUNTER — TRANSCRIPTION ENCOUNTER (OUTPATIENT)
Age: 70
End: 2020-11-10

## 2020-11-10 LAB
BASOPHILS # BLD AUTO: 0.06 K/UL
BASOPHILS NFR BLD AUTO: 0.7 %
EOSINOPHIL # BLD AUTO: 0.11 K/UL
EOSINOPHIL NFR BLD AUTO: 1.3 %
HCT VFR BLD CALC: 43.8 %
HGB BLD-MCNC: 14.5 G/DL
IMM GRANULOCYTES NFR BLD AUTO: 0.2 %
INR PPP: 2.34 RATIO
LYMPHOCYTES # BLD AUTO: 2.1 K/UL
LYMPHOCYTES NFR BLD AUTO: 24.7 %
MAN DIFF?: NORMAL
MCHC RBC-ENTMCNC: 32.9 PG
MCHC RBC-ENTMCNC: 33.1 GM/DL
MCV RBC AUTO: 99.3 FL
MONOCYTES # BLD AUTO: 0.66 K/UL
MONOCYTES NFR BLD AUTO: 7.8 %
NEUTROPHILS # BLD AUTO: 5.56 K/UL
NEUTROPHILS NFR BLD AUTO: 65.3 %
PLATELET # BLD AUTO: 566 K/UL
PT BLD: 26.5 SEC
RBC # BLD: 4.41 M/UL
RBC # FLD: 22.1 %
WBC # FLD AUTO: 8.51 K/UL

## 2020-11-12 ENCOUNTER — TRANSCRIPTION ENCOUNTER (OUTPATIENT)
Age: 70
End: 2020-11-12

## 2020-11-23 ENCOUNTER — LABORATORY RESULT (OUTPATIENT)
Age: 70
End: 2020-11-23

## 2020-12-04 LAB
BASOPHILS # BLD AUTO: 0.05 K/UL
BASOPHILS NFR BLD AUTO: 0.6 %
EOSINOPHIL # BLD AUTO: 0.13 K/UL
EOSINOPHIL NFR BLD AUTO: 1.6 %
HCT VFR BLD CALC: 45.1 %
HGB BLD-MCNC: 14.8 G/DL
IMM GRANULOCYTES NFR BLD AUTO: 0.4 %
INR PPP: 1.98 RATIO
LYMPHOCYTES # BLD AUTO: 1.95 K/UL
LYMPHOCYTES NFR BLD AUTO: 24.4 %
MAN DIFF?: NORMAL
MCHC RBC-ENTMCNC: 32.8 GM/DL
MCHC RBC-ENTMCNC: 33.6 PG
MCV RBC AUTO: 102.5 FL
MONOCYTES # BLD AUTO: 0.6 K/UL
MONOCYTES NFR BLD AUTO: 7.5 %
NEUTROPHILS # BLD AUTO: 5.22 K/UL
NEUTROPHILS NFR BLD AUTO: 65.5 %
PLATELET # BLD AUTO: 615 K/UL
PT BLD: 22.8 SEC
RBC # BLD: 4.4 M/UL
RBC # FLD: 20.5 %
WBC # FLD AUTO: 7.98 K/UL

## 2020-12-07 ENCOUNTER — LABORATORY RESULT (OUTPATIENT)
Age: 70
End: 2020-12-07

## 2020-12-10 ENCOUNTER — TRANSCRIPTION ENCOUNTER (OUTPATIENT)
Age: 70
End: 2020-12-10

## 2020-12-10 LAB
BASOPHILS # BLD AUTO: 0.06 K/UL
BASOPHILS NFR BLD AUTO: 0.8 %
EOSINOPHIL # BLD AUTO: 0.17 K/UL
EOSINOPHIL NFR BLD AUTO: 2.2 %
HCT VFR BLD CALC: 45.2 %
HGB BLD-MCNC: 14.4 G/DL
IMM GRANULOCYTES NFR BLD AUTO: 0.3 %
INR PPP: 1.86 RATIO
LYMPHOCYTES # BLD AUTO: 1.76 K/UL
LYMPHOCYTES NFR BLD AUTO: 22.5 %
MAN DIFF?: NORMAL
MCHC RBC-ENTMCNC: 31.9 GM/DL
MCHC RBC-ENTMCNC: 34.4 PG
MCV RBC AUTO: 107.9 FL
MONOCYTES # BLD AUTO: 0.6 K/UL
MONOCYTES NFR BLD AUTO: 7.7 %
NEUTROPHILS # BLD AUTO: 5.2 K/UL
NEUTROPHILS NFR BLD AUTO: 66.5 %
PLATELET # BLD AUTO: 602 K/UL
PT BLD: 21.3 SEC
RBC # BLD: 4.19 M/UL
RBC # FLD: 18.3 %
WBC # FLD AUTO: 7.81 K/UL

## 2020-12-14 ENCOUNTER — TRANSCRIPTION ENCOUNTER (OUTPATIENT)
Age: 70
End: 2020-12-14

## 2021-01-06 ENCOUNTER — NON-APPOINTMENT (OUTPATIENT)
Age: 71
End: 2021-01-06

## 2021-01-06 ENCOUNTER — APPOINTMENT (OUTPATIENT)
Dept: HEART AND VASCULAR | Facility: CLINIC | Age: 71
End: 2021-01-06
Payer: MEDICARE

## 2021-01-06 VITALS
BODY MASS INDEX: 26.97 KG/M2 | TEMPERATURE: 96 F | OXYGEN SATURATION: 96 % | SYSTOLIC BLOOD PRESSURE: 102 MMHG | HEART RATE: 81 BPM | WEIGHT: 180 LBS | HEIGHT: 68.5 IN | DIASTOLIC BLOOD PRESSURE: 80 MMHG | RESPIRATION RATE: 15 BRPM

## 2021-01-06 DIAGNOSIS — Z23 ENCOUNTER FOR IMMUNIZATION: ICD-10-CM

## 2021-01-06 DIAGNOSIS — I25.2 OLD MYOCARDIAL INFARCTION: ICD-10-CM

## 2021-01-06 DIAGNOSIS — I21.09 ST ELEVATION (STEMI) MYOCARDIAL INFARCTION INVOLVING OTHER CORONARY ARTERY OF ANTERIOR WALL: ICD-10-CM

## 2021-01-06 DIAGNOSIS — I23.6 THROMBOSIS OF ATRIUM, AURICULAR APPENDAGE, AND VENTRICLE AS CURRENT COMPLICATIONS FOLLOWING ACUTE MYOCARDIAL INFARCTION: ICD-10-CM

## 2021-01-06 PROCEDURE — 93246 EXT ECG>7D<15D RECORDING: CPT

## 2021-01-06 PROCEDURE — 99072 ADDL SUPL MATRL&STAF TM PHE: CPT

## 2021-01-06 PROCEDURE — 99214 OFFICE O/P EST MOD 30 MIN: CPT

## 2021-01-06 PROCEDURE — 93248 EXT ECG>7D<15D REV&INTERPJ: CPT | Mod: 59

## 2021-01-06 PROCEDURE — 93000 ELECTROCARDIOGRAM COMPLETE: CPT

## 2021-01-06 PROCEDURE — G0008: CPT

## 2021-01-06 PROCEDURE — 90662 IIV NO PRSV INCREASED AG IM: CPT

## 2021-01-06 PROCEDURE — 36415 COLL VENOUS BLD VENIPUNCTURE: CPT

## 2021-01-07 LAB
BASOPHILS # BLD AUTO: 0.06 K/UL
BASOPHILS # BLD AUTO: 0.07 K/UL
BASOPHILS NFR BLD AUTO: 0.8 %
BASOPHILS NFR BLD AUTO: 0.8 %
EOSINOPHIL # BLD AUTO: 0.08 K/UL
EOSINOPHIL # BLD AUTO: 0.12 K/UL
EOSINOPHIL NFR BLD AUTO: 1 %
EOSINOPHIL NFR BLD AUTO: 1.3 %
HCT VFR BLD CALC: 48.7 %
HCT VFR BLD CALC: 49.7 %
HGB BLD-MCNC: 15.8 G/DL
HGB BLD-MCNC: 16 G/DL
IMM GRANULOCYTES NFR BLD AUTO: 0.3 %
IMM GRANULOCYTES NFR BLD AUTO: 0.4 %
INR PPP: 1.89 RATIO
INR PPP: 1.93 RATIO
LYMPHOCYTES # BLD AUTO: 1.63 K/UL
LYMPHOCYTES # BLD AUTO: 2.09 K/UL
LYMPHOCYTES NFR BLD AUTO: 21.2 %
LYMPHOCYTES NFR BLD AUTO: 23.4 %
MAN DIFF?: NORMAL
MAN DIFF?: NORMAL
MCHC RBC-ENTMCNC: 32.2 GM/DL
MCHC RBC-ENTMCNC: 32.4 GM/DL
MCHC RBC-ENTMCNC: 35 PG
MCHC RBC-ENTMCNC: 35.8 PG
MCV RBC AUTO: 107.7 FL
MCV RBC AUTO: 111.2 FL
MONOCYTES # BLD AUTO: 0.56 K/UL
MONOCYTES # BLD AUTO: 0.61 K/UL
MONOCYTES NFR BLD AUTO: 6.8 %
MONOCYTES NFR BLD AUTO: 7.3 %
NEUTROPHILS # BLD AUTO: 5.34 K/UL
NEUTROPHILS # BLD AUTO: 6.01 K/UL
NEUTROPHILS NFR BLD AUTO: 67.3 %
NEUTROPHILS NFR BLD AUTO: 69.4 %
PLATELET # BLD AUTO: 553 K/UL
PLATELET # BLD AUTO: 626 K/UL
PT BLD: 21.8 SEC
PT BLD: 22.1 SEC
RBC # BLD: 4.47 M/UL
RBC # BLD: 4.52 M/UL
RBC # FLD: 14.7 %
RBC # FLD: 15.9 %
WBC # FLD AUTO: 7.69 K/UL
WBC # FLD AUTO: 8.94 K/UL

## 2021-01-08 LAB
ALBUMIN SERPL ELPH-MCNC: 4.6 G/DL
ALP BLD-CCNC: 95 U/L
ALT SERPL-CCNC: 58 U/L
ANION GAP SERPL CALC-SCNC: 17 MMOL/L
APPEARANCE: CLEAR
AST SERPL-CCNC: 29 U/L
BILIRUB SERPL-MCNC: 0.5 MG/DL
BILIRUBIN URINE: NEGATIVE
BLOOD URINE: NORMAL
BUN SERPL-MCNC: 26 MG/DL
CALCIUM SERPL-MCNC: 9.7 MG/DL
CHLORIDE SERPL-SCNC: 99 MMOL/L
CHOLEST SERPL-MCNC: 137 MG/DL
CO2 SERPL-SCNC: 22 MMOL/L
COLOR: YELLOW
CREAT SERPL-MCNC: 1.16 MG/DL
CREAT SPEC-SCNC: 187 MG/DL
ESTIMATED AVERAGE GLUCOSE: 108 MG/DL
GLUCOSE QUALITATIVE U: NEGATIVE
GLUCOSE SERPL-MCNC: 77 MG/DL
HBA1C MFR BLD HPLC: 5.4 %
HDLC SERPL-MCNC: 41 MG/DL
KETONES URINE: NEGATIVE
LDLC SERPL CALC-MCNC: 76 MG/DL
LEUKOCYTE ESTERASE URINE: NEGATIVE
MICROALBUMIN 24H UR DL<=1MG/L-MCNC: <1.2 MG/DL
MICROALBUMIN/CREAT 24H UR-RTO: NORMAL MG/G
NITRITE URINE: NEGATIVE
NONHDLC SERPL-MCNC: 95 MG/DL
NT-PROBNP SERPL-MCNC: 583 PG/ML
PH URINE: 6.5
POTASSIUM SERPL-SCNC: 4.3 MMOL/L
PROT SERPL-MCNC: 6.9 G/DL
PROTEIN URINE: NORMAL
SODIUM SERPL-SCNC: 138 MMOL/L
SPECIFIC GRAVITY URINE: 1.03
TRIGL SERPL-MCNC: 94 MG/DL
TSH SERPL-ACNC: 1.52 UIU/ML
UROBILINOGEN URINE: NORMAL

## 2021-01-13 ENCOUNTER — TRANSCRIPTION ENCOUNTER (OUTPATIENT)
Age: 71
End: 2021-01-13

## 2021-01-13 PROBLEM — I21.09 MYOCARDIAL INFARCTION, ANTERIOR WALL: Status: ACTIVE | Noted: 2020-06-17

## 2021-01-13 PROBLEM — I23.6 LV (LEFT VENTRICULAR) MURAL THROMBUS FOLLOWING MI: Status: ACTIVE | Noted: 2020-06-26

## 2021-01-13 NOTE — DISCUSSION/SUMMARY
[Event Recording] : a patient demand event recording [INR Low] : the patient's INR is sub-therapeutic [Coronary Artery Disease] : coronary artery disease [Stable] : stable [Outpatient Evaluation] : outpatient evaluation [Echocardiogram] : echocardiogram [___ Month(s)] : [unfilled] month(s) [With Me] : with me [de-identified] : 14 day Zio patch  [FreeTextEntry1] : venipuncture with BNP\par \par 14 day Zio patch \par \par advised to have Covid 19 vaccination as soon as available to him \par \par reinforced diet and walking exercise \par \par set up echocardiogram and carotid duplex scans

## 2021-01-13 NOTE — REVIEW OF SYSTEMS
[Dyspnea on exertion] : dyspnea during exertion [Anxiety] : anxiety [Negative] : Endocrine [Under Stress] : not under stress

## 2021-01-13 NOTE — HISTORY OF PRESENT ILLNESS
[FreeTextEntry1] : EKG shows NSR 72 bpm with LV aneurysm pattern without ectopy + anterior wall MI with  QTc 406 msec \par \par Patient denies chest pains, syncope, orthopnea or significant dyspnea on exertion\par \par LVEF was 35% \par \par Latest INR 1.93 and he denies bleeding \par \par Compliant with all medications, he walks daily for exercise \par \par He has not  demonstrated clinical evidence of CHF  despite  elevated BNP  levels previously \par \par Denies cough, chills, fevers , bleeding

## 2021-01-13 NOTE — PHYSICAL EXAM
[General Appearance - Well Developed] : well developed [Normal Appearance] : normal appearance [Well Groomed] : well groomed [General Appearance - Well Nourished] : well nourished [No Deformities] : no deformities [General Appearance - In No Acute Distress] : no acute distress [Normal Conjunctiva] : the conjunctiva exhibited no abnormalities [Eyelids - No Xanthelasma] : the eyelids demonstrated no xanthelasmas [No Oral Cyanosis] : no oral cyanosis [Normal Jugular Venous A Waves Present] : normal jugular venous A waves present [Normal Jugular Venous V Waves Present] : normal jugular venous V waves present [No Jugular Venous Knight A Waves] : no jugular venous knight A waves [Respiration, Rhythm And Depth] : normal respiratory rhythm and effort [Exaggerated Use Of Accessory Muscles For Inspiration] : no accessory muscle use [Auscultation Breath Sounds / Voice Sounds] : lungs were clear to auscultation bilaterally [Heart Rate And Rhythm] : heart rate and rhythm were normal [Heart Sounds] : normal S1 and S2 [Edema] : no peripheral edema present [Murmurs] : no murmurs present [Bowel Sounds] : normal bowel sounds [Abdomen Soft] : soft [Abdomen Tenderness] : non-tender [Abdomen Mass (___ Cm)] : no abdominal mass palpated [Abnormal Walk] : normal gait [Gait - Sufficient For Exercise Testing] : the gait was sufficient for exercise testing [Nail Clubbing] : no clubbing of the fingernails [Cyanosis, Localized] : no localized cyanosis [Petechial Hemorrhages (___cm)] : no petechial hemorrhages [Skin Color & Pigmentation] : normal skin color and pigmentation [Skin Turgor] : normal skin turgor [] : no rash [No Venous Stasis] : no venous stasis [No Skin Ulcers] : no skin ulcer [Skin Lesions] : no skin lesions [No Xanthoma] : no  xanthoma was observed [Oriented To Time, Place, And Person] : oriented to person, place, and time [Impaired Insight] : insight and judgment were intact [Affect] : the affect was normal [Mood] : the mood was normal [No Anxiety] : not feeling anxious [FreeTextEntry1] : normal capillary refill

## 2021-01-13 NOTE — REASON FOR VISIT
[Follow-Up - Clinic] : a clinic follow-up of [Abnormal ECG] : an abnormal ECG [Anticoagulation] : anticoagulation [Cardiomyopathy] : cardiomyopathy [Hyperlipidemia] : hyperlipidemia [Prior Myocardial Infarction] : a prior myocardial infarction [Family Member] : family member [FreeTextEntry1] : 70 year old Richard male with hypertension  s/p  anterior wall mi  complicated by large LV thrombus was discharged June 22nd, 2020  after successful PTCA/VENESSA of p LAD and prox and mid RCA. \par \par Diagnosed with ANGELINA 2 thrombocytosis  and is  hydrea \par \par \par He is on triple therapy : warfarin, aspirin 81mg and clopidogrel. \par \par \par   . \par \par

## 2021-01-17 LAB — UBIQUINONE10 SERPL-MCNC: 0.47 UG/ML

## 2021-01-20 ENCOUNTER — TRANSCRIPTION ENCOUNTER (OUTPATIENT)
Age: 71
End: 2021-01-20

## 2021-01-20 LAB
INR PPP: 1.71 RATIO
PT BLD: 19.8 SEC

## 2021-01-21 ENCOUNTER — TRANSCRIPTION ENCOUNTER (OUTPATIENT)
Age: 71
End: 2021-01-21

## 2021-01-25 ENCOUNTER — TRANSCRIPTION ENCOUNTER (OUTPATIENT)
Age: 71
End: 2021-01-25

## 2021-01-25 ENCOUNTER — LABORATORY RESULT (OUTPATIENT)
Age: 71
End: 2021-01-25

## 2021-01-26 ENCOUNTER — TRANSCRIPTION ENCOUNTER (OUTPATIENT)
Age: 71
End: 2021-01-26

## 2021-02-03 ENCOUNTER — LABORATORY RESULT (OUTPATIENT)
Age: 71
End: 2021-02-03

## 2021-02-04 ENCOUNTER — TRANSCRIPTION ENCOUNTER (OUTPATIENT)
Age: 71
End: 2021-02-04

## 2021-02-04 LAB
BASOPHILS # BLD AUTO: 0.04 K/UL
BASOPHILS NFR BLD AUTO: 0.5 %
EOSINOPHIL # BLD AUTO: 0.1 K/UL
EOSINOPHIL NFR BLD AUTO: 1.3 %
HCT VFR BLD CALC: 45.3 %
HGB BLD-MCNC: 14.9 G/DL
IMM GRANULOCYTES NFR BLD AUTO: 0.5 %
INR PPP: 1.99 RATIO
LYMPHOCYTES # BLD AUTO: 1.79 K/UL
LYMPHOCYTES NFR BLD AUTO: 23 %
MAN DIFF?: NORMAL
MCHC RBC-ENTMCNC: 32.9 GM/DL
MCHC RBC-ENTMCNC: 35.6 PG
MCV RBC AUTO: 108.4 FL
MONOCYTES # BLD AUTO: 0.57 K/UL
MONOCYTES NFR BLD AUTO: 7.3 %
NEUTROPHILS # BLD AUTO: 5.23 K/UL
NEUTROPHILS NFR BLD AUTO: 67.4 %
PLATELET # BLD AUTO: 599 K/UL
PT BLD: 22.9 SEC
RBC # BLD: 4.18 M/UL
RBC # FLD: 13.5 %
WBC # FLD AUTO: 7.77 K/UL

## 2021-02-08 ENCOUNTER — TRANSCRIPTION ENCOUNTER (OUTPATIENT)
Age: 71
End: 2021-02-08

## 2021-02-11 ENCOUNTER — TRANSCRIPTION ENCOUNTER (OUTPATIENT)
Age: 71
End: 2021-02-11

## 2021-02-11 PROBLEM — I25.2 EVIDENCE OF PRIOR MYOCARDIAL INFARCTION ON ELECTROCARDIOGRAM: Status: ACTIVE | Noted: 2020-06-17

## 2021-02-12 ENCOUNTER — TRANSCRIPTION ENCOUNTER (OUTPATIENT)
Age: 71
End: 2021-02-12

## 2021-02-13 ENCOUNTER — TRANSCRIPTION ENCOUNTER (OUTPATIENT)
Age: 71
End: 2021-02-13

## 2021-02-15 ENCOUNTER — LABORATORY RESULT (OUTPATIENT)
Age: 71
End: 2021-02-15

## 2021-02-17 ENCOUNTER — TRANSCRIPTION ENCOUNTER (OUTPATIENT)
Age: 71
End: 2021-02-17

## 2021-02-25 ENCOUNTER — APPOINTMENT (OUTPATIENT)
Dept: HEMATOLOGY ONCOLOGY | Facility: CLINIC | Age: 71
End: 2021-02-25
Payer: MEDICARE

## 2021-02-25 PROCEDURE — 99214 OFFICE O/P EST MOD 30 MIN: CPT | Mod: 95

## 2021-02-25 NOTE — HISTORY OF PRESENT ILLNESS
[Home] : at home, [unfilled] , at the time of the visit. [Medical Office: ([facility])___] : at the medical office located in  [Verbal consent obtained from patient] : the patient, [unfilled] [de-identified] : 70 years old male past medical history significant for hypertension... Patient was noted to have an acute myocardial infarction complicated by a thrombosis during [place] time... He was also found to have increased platelet count and Ramez 2 positivity consistent with essential thrombocytosis... He was sent in for hematological consultation [de-identified] : 8/18/2020 patient returns for follow-up with his son... He has started on Hydrea 500 mg daily... He states he might be a little tired from it however he is willing to continue... Repeat CBC platelet count went up to 900,000...\par \par 9/29/2020 patient returns to the office for follow-up for his essential thrombocytosis... He states he has been taking the Hydrea at thousand milligram, and was actually very upset that his last CBC on 915 the platelet count went up to 800,000... I reassured patient that we are trying to get his platelet count down gradually... and if increase the Hydrea dose to fast he might develop anemia, which will worsen his coronary artery disease\par \par February 25, 2021 patient requested a telehealth appointment to discuss his condition... He has no new complaints,,, he has been getting his blood tested every 2 weeks,,,

## 2021-02-25 NOTE — ASSESSMENT
[FreeTextEntry1] : Long discussion with patient and son... Since Ramez 2 was positive, patient has a myeloproliferative disorder called essential thrombocytosis... His acute MI was complicated by mural thrombus, probably related to his essential thrombocytosis...\par \par Patient is taking Hydrea at 1000 mg Monday through Friday, and 500 mg on Saturday and Sunday... He is tolerating very well... His most recent platelet count 600,000.\par \par Patient is also taking Coumadin, with INR mostly therapeutic... Dr. Woods is adjusting the Coumadin as needed.\par \par Plan is for patient to continue this current therapy... Have repeat blood work every 2 weeks as arranged...\par \par Follow-up here for in person appointment in July or August, or sooner if needed.\par

## 2021-03-02 LAB
BASOPHILS # BLD AUTO: 0.06 K/UL
BASOPHILS NFR BLD AUTO: 0.8 %
EOSINOPHIL # BLD AUTO: 0.14 K/UL
EOSINOPHIL NFR BLD AUTO: 2 %
HCT VFR BLD CALC: 44.9 %
HGB BLD-MCNC: 15.2 G/DL
IMM GRANULOCYTES NFR BLD AUTO: 0.4 %
INR PPP: 2.02 RATIO
LYMPHOCYTES # BLD AUTO: 1.6 K/UL
LYMPHOCYTES NFR BLD AUTO: 22.3 %
MAN DIFF?: NORMAL
MCHC RBC-ENTMCNC: 33.9 GM/DL
MCHC RBC-ENTMCNC: 36.8 PG
MCV RBC AUTO: 108.7 FL
MONOCYTES # BLD AUTO: 0.64 K/UL
MONOCYTES NFR BLD AUTO: 8.9 %
NEUTROPHILS # BLD AUTO: 4.69 K/UL
NEUTROPHILS NFR BLD AUTO: 65.6 %
PLATELET # BLD AUTO: 578 K/UL
PT BLD: 23.2 SEC
RBC # BLD: 4.13 M/UL
RBC # FLD: 13.3 %
WBC # FLD AUTO: 7.16 K/UL

## 2021-03-15 ENCOUNTER — LABORATORY RESULT (OUTPATIENT)
Age: 71
End: 2021-03-15

## 2021-03-16 ENCOUNTER — TRANSCRIPTION ENCOUNTER (OUTPATIENT)
Age: 71
End: 2021-03-16

## 2021-03-16 ENCOUNTER — NON-APPOINTMENT (OUTPATIENT)
Age: 71
End: 2021-03-16

## 2021-03-29 ENCOUNTER — LABORATORY RESULT (OUTPATIENT)
Age: 71
End: 2021-03-29

## 2021-03-31 ENCOUNTER — TRANSCRIPTION ENCOUNTER (OUTPATIENT)
Age: 71
End: 2021-03-31

## 2021-03-31 ENCOUNTER — EMERGENCY (EMERGENCY)
Facility: HOSPITAL | Age: 71
LOS: 1 days | Discharge: HOME | End: 2021-03-31
Admitting: EMERGENCY MEDICINE
Payer: MEDICARE

## 2021-03-31 VITALS
HEART RATE: 94 BPM | RESPIRATION RATE: 18 BRPM | WEIGHT: 188.94 LBS | TEMPERATURE: 98 F | HEIGHT: 69 IN | OXYGEN SATURATION: 99 % | SYSTOLIC BLOOD PRESSURE: 148 MMHG | DIASTOLIC BLOOD PRESSURE: 96 MMHG

## 2021-03-31 VITALS
OXYGEN SATURATION: 99 % | TEMPERATURE: 99 F | HEART RATE: 86 BPM | DIASTOLIC BLOOD PRESSURE: 76 MMHG | RESPIRATION RATE: 18 BRPM | SYSTOLIC BLOOD PRESSURE: 127 MMHG

## 2021-03-31 DIAGNOSIS — N39.0 URINARY TRACT INFECTION, SITE NOT SPECIFIED: ICD-10-CM

## 2021-03-31 DIAGNOSIS — Z79.899 OTHER LONG TERM (CURRENT) DRUG THERAPY: ICD-10-CM

## 2021-03-31 DIAGNOSIS — R30.0 DYSURIA: ICD-10-CM

## 2021-03-31 DIAGNOSIS — I10 ESSENTIAL (PRIMARY) HYPERTENSION: ICD-10-CM

## 2021-03-31 DIAGNOSIS — Z79.82 LONG TERM (CURRENT) USE OF ASPIRIN: ICD-10-CM

## 2021-03-31 PROCEDURE — 87086 URINE CULTURE/COLONY COUNT: CPT

## 2021-03-31 PROCEDURE — 81001 URINALYSIS AUTO W/SCOPE: CPT

## 2021-03-31 PROCEDURE — 99283 EMERGENCY DEPT VISIT LOW MDM: CPT

## 2021-03-31 PROCEDURE — 80048 BASIC METABOLIC PNL TOTAL CA: CPT

## 2021-03-31 PROCEDURE — 85025 COMPLETE CBC W/AUTO DIFF WBC: CPT

## 2021-03-31 PROCEDURE — 36415 COLL VENOUS BLD VENIPUNCTURE: CPT

## 2021-03-31 PROCEDURE — 87186 SC STD MICRODIL/AGAR DIL: CPT

## 2021-03-31 PROCEDURE — 99284 EMERGENCY DEPT VISIT MOD MDM: CPT

## 2021-03-31 RX ORDER — CEFPODOXIME PROXETIL 100 MG
1 TABLET ORAL
Qty: 20 | Refills: 0
Start: 2021-03-31 | End: 2021-04-09

## 2021-03-31 NOTE — ED ADULT TRIAGE NOTE - OTHER COMPLAINTS
c/o dysuria, frequency since Friday. Pt went to urgent care and was prescribed cipro, however pharmacist adviced to contact cardiologist as patient is also on Coumadin for CVA hx.

## 2021-03-31 NOTE — ED ADULT NURSE NOTE - OBJECTIVE STATEMENT
c/o dysuria, frequency since Friday. Pt went to urgent care and was prescribed cipro, however pharmacist advised to contact cardiologist as patient is also on Coumadin for CVA hx.

## 2021-03-31 NOTE — ED PROVIDER NOTE - CLINICAL SUMMARY MEDICAL DECISION MAKING FREE TEXT BOX
69 y/o male with a PMHx of HTN is here in the ED requesting for change of his abx for treatment regarding his UTI found on UA yesterday. Pt without constitutional symptoms who is well-appearing and no CVAT. Vs and labs wnml without findings of anemia or MADI. UA + for UTI. Will treat for UTI. Given return precautions. Do not suspect pyelonephritis and no abdominal ttp, therefore do not suspect surgical abdomen. No testicular pain or swelling, do not suspect stds, epididymitis or torsion. I have discussed the discharge plan with the patient. The patient agrees with the plan, as discussed.  The patient understands Emergency Department diagnosis is a preliminary diagnosis often based on limited information and that the patient must adhere to the follow-up plan as discussed.  The patient understands that if the symptoms worsen or if prescribed medications do not have the desired/planned effect that the patient may return to the Emergency Department at any time for further evaluation and treatment.

## 2021-03-31 NOTE — ED PROVIDER NOTE - PHYSICAL EXAMINATION
CONSTITUTIONAL: Well-developed; well-nourished; in no acute distress.  SKIN: Warm and dry, no acute rash.  HEAD: Normocephalic; atraumatic.  EYES: PERRL, EOM intact; conjunctiva and sclera clear.  ENT: No nasal discharge; airway clear.  NECK: Supple; non tender.  CARD: S1, S2 normal; no murmurs, gallops, or rubs. Regular rate and rhythm.  RESP: No wheezes, rales or rhonchi.  ABD: Normal bowel sounds; soft; non-distended; non-tender; no hepatosplenomegaly. No CVAT  EXT: Normal ROM. No clubbing, cyanosis or edema.  LYMPH: No acute cervical adenopathy.  NEURO: Alert, oriented. Grossly unremarkable.  PSYCH: Cooperative, appropriate.

## 2021-03-31 NOTE — ED PROVIDER NOTE - PATIENT PORTAL LINK FT
You can access the FollowMyHealth Patient Portal offered by [facility] by registering at the following website: [email or web address]. By joining [facility]’s FollowMyHealth portal, you will also be able to view your health information using other applications (apps) compatible with our system.

## 2021-03-31 NOTE — ED PROVIDER NOTE - NS ED ROS FT
General: no fever, chills, confusion  Cardiac: no chest pain, chest tightness, palpitations  Lungs: no sob, difficulty breathing  Abdomen: no abdominal pain, nausea, vomiting, diarrhea, constipation, nml BM  : + dysuria, urinary frequency/urgency    All other systems negative except as per HPI

## 2021-03-31 NOTE — ED ADULT NURSE REASSESSMENT NOTE - NS ED NURSE REASSESS COMMENT FT1
Pt received from night RN resting on stretcher, son in law Srinath at bedside, serving as . Pt denies pain or discomfort at this moment. Safety precautions in place, will continue to monitor.

## 2021-03-31 NOTE — ED PROVIDER NOTE - OBJECTIVE STATEMENT
69 y/o male with a PMHx of HTN is present in the ER requesting for change of his abx to treat his UTI. Pt states since Friday he has been experiencing urinary symptoms. He describes urinary frequency, urgency and dysuria. He went to [facility] who prescribed him cipro, however pt states the pharmacist advised him to have his medication changed as he is currently on coumadin therefore abx not appropriate. He denies the following: fever, chills, back pain, chest pain, sob, difficulty breathing, sofi hematuria, numbness/tingling to extremities or swelling, and no testicular swelling/pain and no concern for stds.

## 2021-03-31 NOTE — ED PROVIDER NOTE - NSFOLLOWUPINSTRUCTIONS_ED_ALL_ED_FT
Return to the Emergency Department if you have any new or worsening symptoms, or if you have any concerns.     Please reach out to Kelley Lane ([facility] ED clinical referral coordinator) to assist you with your follow-up appointment.     Monday - Friday 11am-7pm  [phone]  [email or web address]    Urinary Tract Infection, Adult       A urinary tract infection (UTI) is an infection of any part of the urinary tract. The urinary tract includes the kidneys, ureters, bladder, and urethra. These organs make, store, and get rid of urine in the body.    Your health care provider may use other names to describe the infection. An upper UTI affects the ureters and kidneys (pyelonephritis). A lower UTI affects the bladder (cystitis) and urethra (urethritis).      What are the causes?    Most urinary tract infections are caused by bacteria in your genital area, around the entrance to your urinary tract (urethra). These bacteria grow and cause inflammation of your urinary tract.      What increases the risk?  You are more likely to develop this condition if:  •You have a urinary catheter that stays in place (indwelling).      •You are not able to control when you urinate or have a bowel movement (you have incontinence).    •You are female and you:  •Use a spermicide or diaphragm for birth control.      •Have low estrogen levels.      •Are pregnant.        •You have certain genes that increase your risk (genetics).      •You are sexually active.      •You take antibiotic medicines.    •You have a condition that causes your flow of urine to slow down, such as:  •An enlarged prostate, if you are male.      •Blockage in your urethra (stricture).      •A kidney stone.      •A nerve condition that affects your bladder control (neurogenic bladder).      •Not getting enough to drink, or not urinating often.      •You have certain medical conditions, such as:  •Diabetes.      •A weak disease-fighting system (immunesystem).      •Sickle cell disease.      •Gout.      •Spinal cord injury.          What are the signs or symptoms?  Symptoms of this condition include:  •Needing to urinate right away (urgently).      •Frequent urination or passing small amounts of urine frequently.      •Pain or burning with urination.      •Blood in the urine.      •Urine that smells bad or unusual.      •Trouble urinating.      •Cloudy urine.      •Vaginal discharge, if you are female.      •Pain in the abdomen or the lower back.    You may also have:  •Vomiting or a decreased appetite.      •Confusion.      •Irritability or tiredness.      •A fever.      •Diarrhea.      The first symptom in older adults may be confusion. In some cases, they may not have any symptoms until the infection has worsened.      How is this diagnosed?  This condition is diagnosed based on your medical history and a physical exam. You may also have other tests, including:  •Urine tests.      •Blood tests.      •Tests for sexually transmitted infections (STIs).      If you have had more than one UTI, a cystoscopy or imaging studies may be done to determine the cause of the infections.      How is this treated?  Treatment for this condition includes:  •Antibiotic medicine.      •Over-the-counter medicines to treat discomfort.      •Drinking enough water to stay hydrated.      If you have frequent infections or have other conditions such as a kidney stone, you may need to see a health care provider who specializes in the urinary tract (urologist).    In rare cases, urinary tract infections can cause sepsis. Sepsis is a life-threatening condition that occurs when the body responds to an infection. Sepsis is treated in the hospital with IV antibiotics, fluids, and other medicines.      Follow these instructions at home:     Medicines     •Take over-the-counter and prescription medicines only as told by your health care provider.      •If you were prescribed an antibiotic medicine, take it as told by your health care provider. Do not stop using the antibiotic even if you start to feel better.      General instructions   •Make sure you:  •Empty your bladder often and completely. Do not hold urine for long periods of time.      •Empty your bladder after sex.      •Wipe from front to back after a bowel movement if you are female. Use each tissue one time when you wipe.        •Drink enough fluid to keep your urine pale yellow.      •Keep all follow-up visits as told by your health care provider. This is important.        Contact a health care provider if:    •Your symptoms do not get better after 1–2 days.      •Your symptoms go away and then return.        Get help right away if you have:    •Severe pain in your back or your lower abdomen.      •A fever.      •Nausea or vomiting.        Summary    •A urinary tract infection (UTI) is an infection of any part of the urinary tract, which includes the kidneys, ureters, bladder, and urethra.      •Most urinary tract infections are caused by bacteria in your genital area, around the entrance to your urinary tract (urethra).      •Treatment for this condition often includes antibiotic medicines.      •If you were prescribed an antibiotic medicine, take it as told by your health care provider. Do not stop using the antibiotic even if you start to feel better.      •Keep all follow-up visits as told by your health care provider. This is important.      This information is not intended to replace advice given to you by your health care provider. Make sure you discuss any questions you have with your health care provider.      Document Revised: 12/05/2019 Document Reviewed: 06/27/2019    [employer] Patient Education © 2021 [employer]

## 2021-04-02 ENCOUNTER — TRANSCRIPTION ENCOUNTER (OUTPATIENT)
Age: 71
End: 2021-04-02

## 2021-04-05 ENCOUNTER — TRANSCRIPTION ENCOUNTER (OUTPATIENT)
Age: 71
End: 2021-04-05

## 2021-04-05 LAB
BASOPHILS # BLD AUTO: 0.15 K/UL
BASOPHILS NFR BLD AUTO: 0.9 %
EOSINOPHIL # BLD AUTO: 0 K/UL
EOSINOPHIL NFR BLD AUTO: 0 %
HCT VFR BLD CALC: 43.4 %
HGB BLD-MCNC: 14.5 G/DL
INR PPP: 2.52 RATIO
LYMPHOCYTES # BLD AUTO: 2.25 K/UL
LYMPHOCYTES NFR BLD AUTO: 13.5 %
MAN DIFF?: NORMAL
MCHC RBC-ENTMCNC: 33.4 GM/DL
MCHC RBC-ENTMCNC: 36 PG
MCV RBC AUTO: 107.7 FL
MONOCYTES # BLD AUTO: 0.9 K/UL
MONOCYTES NFR BLD AUTO: 5.4 %
NEUTROPHILS # BLD AUTO: 13.36 K/UL
NEUTROPHILS NFR BLD AUTO: 80.2 %
PLATELET # BLD AUTO: 575 K/UL
PT BLD: 28.5 SEC
RBC # BLD: 4.03 M/UL
RBC # FLD: 13.2 %
WBC # FLD AUTO: 16.66 K/UL

## 2021-04-06 ENCOUNTER — RX RENEWAL (OUTPATIENT)
Age: 71
End: 2021-04-06

## 2021-04-07 ENCOUNTER — TRANSCRIPTION ENCOUNTER (OUTPATIENT)
Age: 71
End: 2021-04-07

## 2021-04-12 ENCOUNTER — TRANSCRIPTION ENCOUNTER (OUTPATIENT)
Age: 71
End: 2021-04-12

## 2021-04-13 ENCOUNTER — TRANSCRIPTION ENCOUNTER (OUTPATIENT)
Age: 71
End: 2021-04-13

## 2021-04-13 ENCOUNTER — LABORATORY RESULT (OUTPATIENT)
Age: 71
End: 2021-04-13

## 2021-04-13 LAB
BASOPHILS # BLD AUTO: 0.07 K/UL
BASOPHILS NFR BLD AUTO: 0.9 %
EOSINOPHIL # BLD AUTO: 0.09 K/UL
EOSINOPHIL NFR BLD AUTO: 1.2 %
HCT VFR BLD CALC: 44.7 %
HGB BLD-MCNC: 14.4 G/DL
IMM GRANULOCYTES NFR BLD AUTO: 0.4 %
INR PPP: 1.25 RATIO
LYMPHOCYTES # BLD AUTO: 1.78 K/UL
LYMPHOCYTES NFR BLD AUTO: 23.1 %
MAN DIFF?: NORMAL
MCHC RBC-ENTMCNC: 32.2 GM/DL
MCHC RBC-ENTMCNC: 35.6 PG
MCV RBC AUTO: 110.4 FL
MONOCYTES # BLD AUTO: 0.52 K/UL
MONOCYTES NFR BLD AUTO: 6.8 %
NEUTROPHILS # BLD AUTO: 5.21 K/UL
NEUTROPHILS NFR BLD AUTO: 67.6 %
PLATELET # BLD AUTO: 697 K/UL
PT BLD: 14.7 SEC
RBC # BLD: 4.05 M/UL
RBC # FLD: 13.9 %
WBC # FLD AUTO: 7.7 K/UL

## 2021-04-14 ENCOUNTER — TRANSCRIPTION ENCOUNTER (OUTPATIENT)
Age: 71
End: 2021-04-14

## 2021-04-15 ENCOUNTER — TRANSCRIPTION ENCOUNTER (OUTPATIENT)
Age: 71
End: 2021-04-15

## 2021-04-27 ENCOUNTER — TRANSCRIPTION ENCOUNTER (OUTPATIENT)
Age: 71
End: 2021-04-27

## 2021-04-27 LAB
BASOPHILS # BLD AUTO: 0.05 K/UL
BASOPHILS NFR BLD AUTO: 0.7 %
EOSINOPHIL # BLD AUTO: 0.17 K/UL
EOSINOPHIL NFR BLD AUTO: 2.4 %
HCT VFR BLD CALC: 43.4 %
HGB BLD-MCNC: 14.5 G/DL
IMM GRANULOCYTES NFR BLD AUTO: 0.3 %
INR PPP: 2.18 RATIO
LYMPHOCYTES # BLD AUTO: 1.81 K/UL
LYMPHOCYTES NFR BLD AUTO: 25.5 %
MAN DIFF?: NORMAL
MCHC RBC-ENTMCNC: 33.4 GM/DL
MCHC RBC-ENTMCNC: 36 PG
MCV RBC AUTO: 107.7 FL
MONOCYTES # BLD AUTO: 0.55 K/UL
MONOCYTES NFR BLD AUTO: 7.7 %
NEUTROPHILS # BLD AUTO: 4.5 K/UL
NEUTROPHILS NFR BLD AUTO: 63.4 %
PLATELET # BLD AUTO: 571 K/UL
PT BLD: 24.8 SEC
RBC # BLD: 4.03 M/UL
RBC # FLD: 13.6 %
WBC # FLD AUTO: 7.1 K/UL

## 2021-05-12 ENCOUNTER — APPOINTMENT (OUTPATIENT)
Dept: HEART AND VASCULAR | Facility: CLINIC | Age: 71
End: 2021-05-12
Payer: MEDICARE

## 2021-05-12 ENCOUNTER — APPOINTMENT (OUTPATIENT)
Dept: HEART AND VASCULAR | Facility: CLINIC | Age: 71
End: 2021-05-12

## 2021-05-12 VITALS
DIASTOLIC BLOOD PRESSURE: 70 MMHG | BODY MASS INDEX: 28.17 KG/M2 | OXYGEN SATURATION: 97 % | RESPIRATION RATE: 15 BRPM | SYSTOLIC BLOOD PRESSURE: 120 MMHG | WEIGHT: 188 LBS | HEART RATE: 68 BPM | TEMPERATURE: 97.6 F | HEIGHT: 68.5 IN

## 2021-05-12 DIAGNOSIS — I21.29 ST ELEVATION (STEMI) MYOCARDIAL INFARCTION INVOLVING OTHER SITES: ICD-10-CM

## 2021-05-12 DIAGNOSIS — Z79.01 LONG TERM (CURRENT) USE OF ANTICOAGULANTS: ICD-10-CM

## 2021-05-12 PROCEDURE — 99214 OFFICE O/P EST MOD 30 MIN: CPT

## 2021-05-12 PROCEDURE — 99072 ADDL SUPL MATRL&STAF TM PHE: CPT

## 2021-05-12 PROCEDURE — 93306 TTE W/DOPPLER COMPLETE: CPT

## 2021-05-12 NOTE — REVIEW OF SYSTEMS
[Weight Gain (___ Lbs)] : [unfilled] ~Ulb weight gain [Easy Bruising] : a tendency for easy bruising [Negative] : Psychiatric

## 2021-05-13 ENCOUNTER — TRANSCRIPTION ENCOUNTER (OUTPATIENT)
Age: 71
End: 2021-05-13

## 2021-05-13 NOTE — DISCUSSION/SUMMARY
[Coronary Artery Disease] : coronary artery disease [Stable] : stable [Responding to Treatment] : responding to treatment [With Me] : with me [___ Month(s)] : in [unfilled] month(s) [FreeTextEntry1] : echocardiogram shows improvement in LV EF  from 35% to 40-45% , resolved apical thrombus ,  mild MR \par \par continue present therapy \par \par walk daily for exercise \par \par medications reconciled \par \par

## 2021-05-13 NOTE — PHYSICAL EXAM
[Well Developed] : well developed [Well Nourished] : well nourished [No Acute Distress] : no acute distress [Normal Conjunctiva] : normal conjunctiva [No Xanthelasma] : no xanthelasma [Normal Venous Pressure] : normal venous pressure [No Carotid Bruit] : no carotid bruit [Normal S1, S2] : normal S1, S2 [No Murmur] : no murmur [No Rub] : no rub [No Gallop] : no gallop [Clear Lung Fields] : clear lung fields [Good Air Entry] : good air entry [No Respiratory Distress] : no respiratory distress  [Soft] : abdomen soft [Non Tender] : non-tender [No Masses/organomegaly] : no masses/organomegaly [Normal Bowel Sounds] : normal bowel sounds [Normal Gait] : normal gait [Gait - Sufficient for Exercise Testing] : gait - sufficient for exercise testing [No Edema] : no edema [No Cyanosis] : no cyanosis [No Clubbing] : no clubbing [No Varicosities] : no varicosities [No Rash] : no rash [No Skin Lesions] : no skin lesions [Moves all extremities] : moves all extremities [No Focal Deficits] : no focal deficits [Normal Speech] : normal speech [Alert and Oriented] : alert and oriented [Normal memory] : normal memory [de-identified] : anicteric

## 2021-05-13 NOTE — REASON FOR VISIT
[Symptom and Test Evaluation] : symptom and test evaluation [Arrhythmia/ECG Abnorrmalities] : arrhythmia/ECG abnormalities [Coronary Artery Disease] : coronary artery disease [Family Member] : family member [FreeTextEntry1] : 70 year old Richard male with hypertension  s/p  anterior wall mi  complicated by large LV thrombus was discharged June 22nd, 2020  after successful PTCA/VENESSA of p LAD and prox and mid RCA. \par \par Diagnosed with ANGELINA 2 thrombocytosis  and is on  Hydrea  with good response \par \par \par He is on  warfarin and aspirin 81mg . \par \par \par In retrospect, his myocardial infarction was likely triggered by thrombocytosis . \par \par

## 2021-05-13 NOTE — HISTORY OF PRESENT ILLNESS
[FreeTextEntry1] : Most recent INR 2.36\par \par No bleeding\par \par Denies dizziness, syncope, sustained palpitations\par \par Walks 5 miles daily  without problems \par \par Presents for follow up echocardiogram to assess status of apical LV thrombus and LVEF \par \par Last BNP  down to 500s  from   2000s\par \par No orthopnea  \par \par Completed Covid vaccination

## 2021-05-13 NOTE — ASSESSMENT
[FreeTextEntry1] : Stable multivessel CAD\par \par LV apical aneurysm with thrombus \par \par essential thrombocytosis (JAK2 + variant )\par \par

## 2021-05-17 ENCOUNTER — TRANSCRIPTION ENCOUNTER (OUTPATIENT)
Age: 71
End: 2021-05-17

## 2021-05-17 LAB
BASOPHILS # BLD AUTO: 0.06 K/UL
BASOPHILS NFR BLD AUTO: 0.8 %
EOSINOPHIL # BLD AUTO: 0.07 K/UL
EOSINOPHIL NFR BLD AUTO: 0.9 %
HCT VFR BLD CALC: 45.5 %
HGB BLD-MCNC: 15 G/DL
IMM GRANULOCYTES NFR BLD AUTO: 0.5 %
INR PPP: 2.36 RATIO
LYMPHOCYTES # BLD AUTO: 2.01 K/UL
LYMPHOCYTES NFR BLD AUTO: 25.6 %
MAN DIFF?: NORMAL
MCHC RBC-ENTMCNC: 33 GM/DL
MCHC RBC-ENTMCNC: 35.7 PG
MCV RBC AUTO: 108.3 FL
MONOCYTES # BLD AUTO: 0.57 K/UL
MONOCYTES NFR BLD AUTO: 7.3 %
NEUTROPHILS # BLD AUTO: 5.09 K/UL
NEUTROPHILS NFR BLD AUTO: 64.9 %
PLATELET # BLD AUTO: 597 K/UL
PT BLD: 27 SEC
RBC # BLD: 4.2 M/UL
RBC # FLD: 14.1 %
WBC # FLD AUTO: 7.84 K/UL

## 2021-05-26 ENCOUNTER — TRANSCRIPTION ENCOUNTER (OUTPATIENT)
Age: 71
End: 2021-05-26

## 2021-05-27 ENCOUNTER — TRANSCRIPTION ENCOUNTER (OUTPATIENT)
Age: 71
End: 2021-05-27

## 2021-05-27 LAB
BASOPHILS # BLD AUTO: 0.05 K/UL
BASOPHILS NFR BLD AUTO: 0.7 %
EOSINOPHIL # BLD AUTO: 0.11 K/UL
EOSINOPHIL NFR BLD AUTO: 1.5 %
HCT VFR BLD CALC: 45.4 %
HGB BLD-MCNC: 14.7 G/DL
IMM GRANULOCYTES NFR BLD AUTO: 0.4 %
INR PPP: 1.83 RATIO
LYMPHOCYTES # BLD AUTO: 1.85 K/UL
LYMPHOCYTES NFR BLD AUTO: 25.3 %
MAN DIFF?: NORMAL
MCHC RBC-ENTMCNC: 32.4 GM/DL
MCHC RBC-ENTMCNC: 36.2 PG
MCV RBC AUTO: 111.8 FL
MONOCYTES # BLD AUTO: 0.66 K/UL
MONOCYTES NFR BLD AUTO: 9 %
NEUTROPHILS # BLD AUTO: 4.62 K/UL
NEUTROPHILS NFR BLD AUTO: 63.1 %
PLATELET # BLD AUTO: 574 K/UL
PT BLD: 21 SEC
RBC # BLD: 4.06 M/UL
RBC # FLD: 14.4 %
WBC # FLD AUTO: 7.32 K/UL

## 2021-06-04 ENCOUNTER — TRANSCRIPTION ENCOUNTER (OUTPATIENT)
Age: 71
End: 2021-06-04

## 2021-06-16 ENCOUNTER — LABORATORY RESULT (OUTPATIENT)
Age: 71
End: 2021-06-16

## 2021-06-17 ENCOUNTER — TRANSCRIPTION ENCOUNTER (OUTPATIENT)
Age: 71
End: 2021-06-17

## 2021-06-17 LAB
INR PPP: 2.31 RATIO
PT BLD: 26.4 SEC

## 2021-06-18 ENCOUNTER — RX RENEWAL (OUTPATIENT)
Age: 71
End: 2021-06-18

## 2021-06-21 ENCOUNTER — TRANSCRIPTION ENCOUNTER (OUTPATIENT)
Age: 71
End: 2021-06-21

## 2021-06-21 LAB
BASOPHILS # BLD AUTO: 0.13 K/UL
BASOPHILS NFR BLD AUTO: 1.7 %
EOSINOPHIL # BLD AUTO: 0.06 K/UL
EOSINOPHIL NFR BLD AUTO: 0.8 %
HCT VFR BLD CALC: 45.3 %
HGB BLD-MCNC: 14.9 G/DL
LYMPHOCYTES # BLD AUTO: 2.96 K/UL
LYMPHOCYTES NFR BLD AUTO: 37.6 %
MAN DIFF?: NORMAL
MCHC RBC-ENTMCNC: 32.9 GM/DL
MCHC RBC-ENTMCNC: 36.8 PG
MCV RBC AUTO: 111.9 FL
MONOCYTES # BLD AUTO: 0.81 K/UL
MONOCYTES NFR BLD AUTO: 10.3 %
NEUTROPHILS # BLD AUTO: 3.9 K/UL
NEUTROPHILS NFR BLD AUTO: 49.6 %
PLATELET # BLD AUTO: 586 K/UL
RBC # BLD: 4.05 M/UL
RBC # FLD: 14.6 %
WBC # FLD AUTO: 7.86 K/UL

## 2021-06-22 ENCOUNTER — RX RENEWAL (OUTPATIENT)
Age: 71
End: 2021-06-22

## 2021-06-30 ENCOUNTER — NON-APPOINTMENT (OUTPATIENT)
Age: 71
End: 2021-06-30

## 2021-06-30 ENCOUNTER — TRANSCRIPTION ENCOUNTER (OUTPATIENT)
Age: 71
End: 2021-06-30

## 2021-06-30 LAB
BASOPHILS # BLD AUTO: 0.06 K/UL
BASOPHILS NFR BLD AUTO: 0.8 %
EOSINOPHIL # BLD AUTO: 0.14 K/UL
EOSINOPHIL NFR BLD AUTO: 1.8 %
HCT VFR BLD CALC: 44 %
HGB BLD-MCNC: 14.5 G/DL
IMM GRANULOCYTES NFR BLD AUTO: 0.4 %
INR PPP: 3.2 RATIO
LYMPHOCYTES # BLD AUTO: 1.95 K/UL
LYMPHOCYTES NFR BLD AUTO: 25.2 %
MAN DIFF?: NORMAL
MCHC RBC-ENTMCNC: 33 GM/DL
MCHC RBC-ENTMCNC: 36.9 PG
MCV RBC AUTO: 112 FL
MONOCYTES # BLD AUTO: 0.63 K/UL
MONOCYTES NFR BLD AUTO: 8.1 %
NEUTROPHILS # BLD AUTO: 4.94 K/UL
NEUTROPHILS NFR BLD AUTO: 63.7 %
PLATELET # BLD AUTO: 517 K/UL
PT BLD: 35.7 SEC
RBC # BLD: 3.93 M/UL
RBC # FLD: 14.6 %
WBC # FLD AUTO: 7.75 K/UL

## 2021-07-20 ENCOUNTER — LABORATORY RESULT (OUTPATIENT)
Age: 71
End: 2021-07-20

## 2021-07-22 ENCOUNTER — TRANSCRIPTION ENCOUNTER (OUTPATIENT)
Age: 71
End: 2021-07-22

## 2021-07-22 LAB
BASOPHILS # BLD AUTO: 0.04 K/UL
BASOPHILS NFR BLD AUTO: 0.5 %
EOSINOPHIL # BLD AUTO: 0.11 K/UL
EOSINOPHIL NFR BLD AUTO: 1.5 %
HCT VFR BLD CALC: 42.7 %
HGB BLD-MCNC: 15.2 G/DL
IMM GRANULOCYTES NFR BLD AUTO: 0.3 %
INR PPP: 2.15 RATIO
LYMPHOCYTES # BLD AUTO: 1.32 K/UL
LYMPHOCYTES NFR BLD AUTO: 18.1 %
MAN DIFF?: NORMAL
MCHC RBC-ENTMCNC: 35.6 GM/DL
MCHC RBC-ENTMCNC: 40.1 PG
MCV RBC AUTO: 112.7 FL
MONOCYTES # BLD AUTO: 0.55 K/UL
MONOCYTES NFR BLD AUTO: 7.5 %
NEUTROPHILS # BLD AUTO: 5.26 K/UL
NEUTROPHILS NFR BLD AUTO: 72.1 %
PLATELET # BLD AUTO: 468 K/UL
PT BLD: 24.5 SEC
RBC # BLD: 3.79 M/UL
RBC # FLD: 14 %
WBC # FLD AUTO: 7.3 K/UL

## 2021-08-23 ENCOUNTER — TRANSCRIPTION ENCOUNTER (OUTPATIENT)
Age: 71
End: 2021-08-23

## 2021-08-24 ENCOUNTER — TRANSCRIPTION ENCOUNTER (OUTPATIENT)
Age: 71
End: 2021-08-24

## 2021-08-31 ENCOUNTER — TRANSCRIPTION ENCOUNTER (OUTPATIENT)
Age: 71
End: 2021-08-31

## 2021-08-31 LAB
BASOPHILS # BLD AUTO: 0.05 K/UL
BASOPHILS NFR BLD AUTO: 0.7 %
EOSINOPHIL # BLD AUTO: 0.13 K/UL
EOSINOPHIL NFR BLD AUTO: 1.8 %
HCT VFR BLD CALC: 47.6 %
HGB BLD-MCNC: 15.3 G/DL
IMM GRANULOCYTES NFR BLD AUTO: 0.3 %
INR PPP: 1.8 RATIO
LYMPHOCYTES # BLD AUTO: 1.54 K/UL
LYMPHOCYTES NFR BLD AUTO: 21.9 %
MAN DIFF?: NORMAL
MCHC RBC-ENTMCNC: 32.1 GM/DL
MCHC RBC-ENTMCNC: 36.3 PG
MCV RBC AUTO: 113.1 FL
MONOCYTES # BLD AUTO: 0.45 K/UL
MONOCYTES NFR BLD AUTO: 6.4 %
NEUTROPHILS # BLD AUTO: 4.84 K/UL
NEUTROPHILS NFR BLD AUTO: 68.9 %
PLATELET # BLD AUTO: 480 K/UL
PT BLD: 20.6 SEC
RBC # BLD: 4.21 M/UL
RBC # FLD: 13.7 %
WBC # FLD AUTO: 7.03 K/UL

## 2021-09-23 ENCOUNTER — LABORATORY RESULT (OUTPATIENT)
Age: 71
End: 2021-09-23

## 2021-09-24 ENCOUNTER — RX RENEWAL (OUTPATIENT)
Age: 71
End: 2021-09-24

## 2021-09-24 ENCOUNTER — TRANSCRIPTION ENCOUNTER (OUTPATIENT)
Age: 71
End: 2021-09-24

## 2021-09-27 LAB
BASOPHILS # BLD AUTO: 0.05 K/UL
BASOPHILS NFR BLD AUTO: 0.7 %
EOSINOPHIL # BLD AUTO: 0.16 K/UL
EOSINOPHIL NFR BLD AUTO: 2.3 %
HCT VFR BLD CALC: 42.8 %
HGB BLD-MCNC: 14.4 G/DL
IMM GRANULOCYTES NFR BLD AUTO: 1.7 %
INR PPP: 3.29 RATIO
LYMPHOCYTES # BLD AUTO: 1.62 K/UL
LYMPHOCYTES NFR BLD AUTO: 23.4 %
MAN DIFF?: NORMAL
MCHC RBC-ENTMCNC: 33.6 GM/DL
MCHC RBC-ENTMCNC: 36.7 PG
MCV RBC AUTO: 109.2 FL
MONOCYTES # BLD AUTO: 0.53 K/UL
MONOCYTES NFR BLD AUTO: 7.7 %
NEUTROPHILS # BLD AUTO: 4.43 K/UL
NEUTROPHILS NFR BLD AUTO: 64.2 %
PLATELET # BLD AUTO: 514 K/UL
PT BLD: 36.7 SEC
RBC # BLD: 3.92 M/UL
RBC # FLD: 13.7 %
WBC # FLD AUTO: 6.91 K/UL

## 2021-10-08 ENCOUNTER — TRANSCRIPTION ENCOUNTER (OUTPATIENT)
Age: 71
End: 2021-10-08

## 2021-10-11 DIAGNOSIS — Z95.5 PRESENCE OF CORONARY ANGIOPLASTY IMPLANT AND GRAFT: ICD-10-CM

## 2021-10-13 ENCOUNTER — APPOINTMENT (OUTPATIENT)
Dept: HEART AND VASCULAR | Facility: CLINIC | Age: 71
End: 2021-10-13
Payer: MEDICARE

## 2021-10-13 ENCOUNTER — LABORATORY RESULT (OUTPATIENT)
Age: 71
End: 2021-10-13

## 2021-10-13 ENCOUNTER — APPOINTMENT (OUTPATIENT)
Dept: HEART AND VASCULAR | Facility: CLINIC | Age: 71
End: 2021-10-13

## 2021-10-13 VITALS
SYSTOLIC BLOOD PRESSURE: 120 MMHG | OXYGEN SATURATION: 99 % | TEMPERATURE: 98 F | HEART RATE: 63 BPM | WEIGHT: 188 LBS | BODY MASS INDEX: 28.17 KG/M2 | RESPIRATION RATE: 15 BRPM | HEIGHT: 68.5 IN | DIASTOLIC BLOOD PRESSURE: 70 MMHG

## 2021-10-13 VITALS — BODY MASS INDEX: 27.57 KG/M2 | WEIGHT: 184 LBS

## 2021-10-13 DIAGNOSIS — I25.5 ISCHEMIC CARDIOMYOPATHY: ICD-10-CM

## 2021-10-13 DIAGNOSIS — Z79.01 LONG TERM (CURRENT) USE OF ANTICOAGULANTS: ICD-10-CM

## 2021-10-13 DIAGNOSIS — R93.1 ABNORMAL FINDINGS ON DIAGNOSTIC IMAGING OF HEART AND CORONARY CIRCULATION: ICD-10-CM

## 2021-10-13 DIAGNOSIS — R94.31 ABNORMAL ELECTROCARDIOGRAM [ECG] [EKG]: ICD-10-CM

## 2021-10-13 PROCEDURE — 90662 IIV NO PRSV INCREASED AG IM: CPT

## 2021-10-13 PROCEDURE — 99214 OFFICE O/P EST MOD 30 MIN: CPT

## 2021-10-13 PROCEDURE — 36415 COLL VENOUS BLD VENIPUNCTURE: CPT

## 2021-10-13 PROCEDURE — 93880 EXTRACRANIAL BILAT STUDY: CPT

## 2021-10-13 PROCEDURE — 93000 ELECTROCARDIOGRAM COMPLETE: CPT

## 2021-10-13 PROCEDURE — G0008: CPT

## 2021-10-13 NOTE — HISTORY OF PRESENT ILLNESS
[FreeTextEntry1] : Patient denies personal hx of covid  and has received 2 doses of Pfizer  completed in February 2021\par \par Requires flu vaccine today\par \par Main complaint is mild orthostasis upon rapid rising from supine posture\par \par Denies falls, chest discomfort, palpitations, bleeding  or significant dyspnea\par \par Wears a smart watch and shows me that he walks an average of 15 thousand steps daily \par \par \par EKG shows  sinus bradycardia 59 bpm  with anteroseptal  infarct pattern  with lateral T wave inversions without ectopy \par \par Most recent LVEF May 2021 was 40%  with anteroapical aneurysm  and resolved LV apical thrombus \par \par

## 2021-10-13 NOTE — ASSESSMENT
[FreeTextEntry1] : stable CAD with moderate LVEF reduction\par \par apical LV aneurysm \par \par ANGELINA 2 thrombocytosis  well controlled on Hydrea \par \par \par

## 2021-10-13 NOTE — DISCUSSION/SUMMARY
[INR Therapeutic] : the patient's INR is therapeutic [Cardiomyopathy] : cardiomyopathy [Coronary Artery Disease] : coronary artery disease [Stable] : stable [Responding to Treatment] : responding to treatment [With Me] : with me [___ Month(s)] : in [unfilled] month(s) [FreeTextEntry1] : venipuncture performed with Covid Ab, a1c, lipids, BNP, etc \par \par carotid duplex scan shows mild atherosclerotic plaques , nonobstructive L>R\par \par HD fluzone administered left arm without incident \par \par In two weeks,  patient can receive Pfizer booster shot \par \par Continue daily walking exercise \par \par Will consider cessation of warfarin and continuation of DAPT \par

## 2021-10-13 NOTE — REVIEW OF SYSTEMS
[Weight Loss (___ Lbs)] : [unfilled] ~Ulb weight loss [Easy Bruising] : a tendency for easy bruising [Negative] : Psychiatric [Weight Gain (___ Lbs)] : no recent weight gain [de-identified] : mild orthostasis

## 2021-10-13 NOTE — PHYSICAL EXAM
[Well Developed] : well developed [Well Nourished] : well nourished [No Acute Distress] : no acute distress [Normal Conjunctiva] : normal conjunctiva [No Xanthelasma] : no xanthelasma [Normal Venous Pressure] : normal venous pressure [No Carotid Bruit] : no carotid bruit [Normal S1, S2] : normal S1, S2 [No Murmur] : no murmur [No Rub] : no rub [No Gallop] : no gallop [Clear Lung Fields] : clear lung fields [Good Air Entry] : good air entry [No Respiratory Distress] : no respiratory distress  [Soft] : abdomen soft [Non Tender] : non-tender [No Masses/organomegaly] : no masses/organomegaly [Normal Bowel Sounds] : normal bowel sounds [Normal Gait] : normal gait [Gait - Sufficient for Exercise Testing] : gait - sufficient for exercise testing [No Edema] : no edema [No Cyanosis] : no cyanosis [No Clubbing] : no clubbing [No Varicosities] : no varicosities [No Rash] : no rash [No Skin Lesions] : no skin lesions losing balance [Moves all extremities] : moves all extremities [No Focal Deficits] : no focal deficits [Normal Speech] : normal speech [Alert and Oriented] : alert and oriented [Normal memory] : normal memory [de-identified] : anicteric  [de-identified] : weak carotid pulse

## 2021-10-13 NOTE — REASON FOR VISIT
[Symptom and Test Evaluation] : symptom and test evaluation [Arrhythmia/ECG Abnorrmalities] : arrhythmia/ECG abnormalities [Coronary Artery Disease] : coronary artery disease [Family Member] : family member [FreeTextEntry1] : 71 year old Richard male with hypertension  s/p  anterior wall mi  complicated by large LV thrombus was discharged June 22 nd, 2020  after successful PTCA/VENESSA of p LAD and prox and mid RCA. \par \par Diagnosed with ANGELINA 2 thrombocytosis  and is on  Hydrea  with good response \par \par \par He is on  warfarin and aspirin 81mg and clopidogrel. \par \par \par In retrospect, his myocardial infarction was likely triggered by thrombocytosis . \par \par

## 2021-10-17 LAB
ALBUMIN SERPL ELPH-MCNC: 4.7 G/DL
ALP BLD-CCNC: 94 U/L
ALT SERPL-CCNC: 41 U/L
ANION GAP SERPL CALC-SCNC: 15 MMOL/L
AST SERPL-CCNC: 24 U/L
BASOPHILS # BLD AUTO: 0 K/UL
BASOPHILS NFR BLD AUTO: 0 %
BILIRUB SERPL-MCNC: 0.5 MG/DL
BUN SERPL-MCNC: 26 MG/DL
CALCIUM SERPL-MCNC: 9.2 MG/DL
CHLORIDE SERPL-SCNC: 102 MMOL/L
CHOLEST SERPL-MCNC: 116 MG/DL
CO2 SERPL-SCNC: 22 MMOL/L
COVID-19 NUCLEOCAPSID  GAM ANTIBODY INTERPRETATION: NEGATIVE
COVID-19 SPIKE DOMAIN ANTIBODY INTERPRETATION: POSITIVE
CREAT SERPL-MCNC: 0.97 MG/DL
EOSINOPHIL # BLD AUTO: 0.21 K/UL
EOSINOPHIL NFR BLD AUTO: 2.6 %
ESTIMATED AVERAGE GLUCOSE: 108 MG/DL
GLUCOSE SERPL-MCNC: 87 MG/DL
HBA1C MFR BLD HPLC: 5.4 %
HCT VFR BLD CALC: 49.5 %
HDLC SERPL-MCNC: 37 MG/DL
HGB BLD-MCNC: 16 G/DL
INR PPP: 2.15 RATIO
LDLC SERPL CALC-MCNC: 59 MG/DL
LYMPHOCYTES # BLD AUTO: 2.29 K/UL
LYMPHOCYTES NFR BLD AUTO: 28.1 %
MAN DIFF?: NORMAL
MCHC RBC-ENTMCNC: 32.3 GM/DL
MCHC RBC-ENTMCNC: 37.4 PG
MCV RBC AUTO: 115.7 FL
MONOCYTES # BLD AUTO: 0.64 K/UL
MONOCYTES NFR BLD AUTO: 7.9 %
NEUTROPHILS # BLD AUTO: 5 K/UL
NEUTROPHILS NFR BLD AUTO: 61.4 %
NONHDLC SERPL-MCNC: 79 MG/DL
NT-PROBNP SERPL-MCNC: 330 PG/ML
PLATELET # BLD AUTO: 495 K/UL
POTASSIUM SERPL-SCNC: 4.1 MMOL/L
PROT SERPL-MCNC: 7.1 G/DL
PT BLD: 24.7 SEC
RBC # BLD: 4.28 M/UL
RBC # FLD: 14.5 %
SARS-COV-2 AB SERPL IA-ACNC: 120 U/ML
SARS-COV-2 AB SERPL QL IA: 0.07 INDEX
SODIUM SERPL-SCNC: 139 MMOL/L
TRIGL SERPL-MCNC: 102 MG/DL
TSH SERPL-ACNC: 1.56 UIU/ML
WBC # FLD AUTO: 8.14 K/UL

## 2021-10-22 LAB — UBIQUINONE10 SERPL-MCNC: 0.32 UG/ML

## 2021-11-08 ENCOUNTER — LABORATORY RESULT (OUTPATIENT)
Age: 71
End: 2021-11-08

## 2021-11-12 ENCOUNTER — TRANSCRIPTION ENCOUNTER (OUTPATIENT)
Age: 71
End: 2021-11-12

## 2021-11-12 LAB
BASOPHILS # BLD AUTO: 0.05 K/UL
BASOPHILS NFR BLD AUTO: 0.8 %
EOSINOPHIL # BLD AUTO: 0.12 K/UL
EOSINOPHIL NFR BLD AUTO: 1.9 %
HCT VFR BLD CALC: 47.7 %
HGB BLD-MCNC: 15.6 G/DL
IMM GRANULOCYTES NFR BLD AUTO: 0.3 %
LYMPHOCYTES # BLD AUTO: 1.65 K/UL
LYMPHOCYTES NFR BLD AUTO: 25.8 %
MAN DIFF?: NORMAL
MCHC RBC-ENTMCNC: 32.7 GM/DL
MCHC RBC-ENTMCNC: 36.6 PG
MCV RBC AUTO: 112 FL
MONOCYTES # BLD AUTO: 0.57 K/UL
MONOCYTES NFR BLD AUTO: 8.9 %
NEUTROPHILS # BLD AUTO: 3.99 K/UL
NEUTROPHILS NFR BLD AUTO: 62.3 %
PLATELET # BLD AUTO: 473 K/UL
RBC # BLD: 4.26 M/UL
RBC # FLD: 13.5 %
WBC # FLD AUTO: 6.4 K/UL

## 2021-11-13 ENCOUNTER — TRANSCRIPTION ENCOUNTER (OUTPATIENT)
Age: 71
End: 2021-11-13

## 2021-12-08 ENCOUNTER — APPOINTMENT (OUTPATIENT)
Dept: HEMATOLOGY ONCOLOGY | Facility: CLINIC | Age: 71
End: 2021-12-08
Payer: MEDICARE

## 2021-12-08 ENCOUNTER — LABORATORY RESULT (OUTPATIENT)
Age: 71
End: 2021-12-08

## 2021-12-08 VITALS
SYSTOLIC BLOOD PRESSURE: 161 MMHG | HEART RATE: 70 BPM | WEIGHT: 186 LBS | HEIGHT: 68.5 IN | OXYGEN SATURATION: 99 % | BODY MASS INDEX: 27.87 KG/M2 | DIASTOLIC BLOOD PRESSURE: 91 MMHG | TEMPERATURE: 97.3 F

## 2021-12-08 PROCEDURE — 99214 OFFICE O/P EST MOD 30 MIN: CPT | Mod: 25

## 2021-12-08 NOTE — HISTORY OF PRESENT ILLNESS
[de-identified] : 70 years old male past medical history significant for hypertension... Patient was noted to have an acute myocardial infarction complicated by a thrombosis during [place] time... He was also found to have increased platelet count and Ramez 2 positivity consistent with essential thrombocytosis... He was sent in for hematological consultation [de-identified] : 8/18/2020 patient returns for follow-up with his son... He has started on Hydrea 500 mg daily... He states he might be a little tired from it however he is willing to continue... Repeat CBC platelet count went up to 900,000...\par \par 9/29/2020 patient returns to the office for follow-up for his essential thrombocytosis... He states he has been taking the Hydrea at thousand milligram, and was actually very upset that his last CBC on 915 the platelet count went up to 800,000... I reassured patient that we are trying to get his platelet count down gradually... and if increase the Hydrea dose to fast he might develop anemia, which will worsen his coronary artery disease\par \par February 25, 2021 patient requested a telehealth appointment to discuss his condition... He has no new complaints,,, he has been getting his blood tested every 2 weeks,,,\par \par December 8, 2021 patient is here for follow-up ... Doing well is off Coumadin... Taking baby aspirin daily... Platelet count is in 4-500,000 range

## 2021-12-08 NOTE — ASSESSMENT
[FreeTextEntry1] : Long discussion with patient and son... Since Ramez 2 was positive, patient has a myeloproliferative disorder called essential thrombocytosis... His acute MI was complicated by mural thrombus, probably related to his essential thrombocytosis...\par \par Patient is taking Hydrea at 1000 mg Monday through Friday, and 500 mg on Saturday and Sunday... He is tolerating very well... His most recent platelet count 473,000....\par \par We did blood work today in the office... Next blood draw in March 2021... Follow-up here in 6 months or sooner if needed

## 2021-12-09 ENCOUNTER — TRANSCRIPTION ENCOUNTER (OUTPATIENT)
Age: 71
End: 2021-12-09

## 2021-12-09 LAB
25(OH)D3 SERPL-MCNC: 24.6 NG/ML
ALBUMIN SERPL ELPH-MCNC: 4.5 G/DL
ALP BLD-CCNC: 98 U/L
ALT SERPL-CCNC: 45 U/L
ANION GAP SERPL CALC-SCNC: 14 MMOL/L
AST SERPL-CCNC: 25 U/L
BASOPHILS # BLD AUTO: 0.03 K/UL
BASOPHILS NFR BLD AUTO: 0.4 %
BILIRUB SERPL-MCNC: 0.4 MG/DL
BUN SERPL-MCNC: 22 MG/DL
CALCIUM SERPL-MCNC: 9.6 MG/DL
CHLORIDE SERPL-SCNC: 102 MMOL/L
CO2 SERPL-SCNC: 24 MMOL/L
CREAT SERPL-MCNC: 0.99 MG/DL
EOSINOPHIL # BLD AUTO: 0.11 K/UL
EOSINOPHIL NFR BLD AUTO: 1.5 %
ERYTHROCYTE [SEDIMENTATION RATE] IN BLOOD BY WESTERGREN METHOD: 4 MM/HR
GLUCOSE SERPL-MCNC: 74 MG/DL
HCT VFR BLD CALC: 48.5 %
HGB BLD-MCNC: 15.8 G/DL
IMM GRANULOCYTES NFR BLD AUTO: 0.4 %
LDH SERPL-CCNC: 304 U/L
LYMPHOCYTES # BLD AUTO: 1.63 K/UL
LYMPHOCYTES NFR BLD AUTO: 22.8 %
MAN DIFF?: NORMAL
MCHC RBC-ENTMCNC: 32.6 GM/DL
MCHC RBC-ENTMCNC: 37.1 PG
MCV RBC AUTO: 113.8 FL
MONOCYTES # BLD AUTO: 0.59 K/UL
MONOCYTES NFR BLD AUTO: 8.3 %
NEUTROPHILS # BLD AUTO: 4.75 K/UL
NEUTROPHILS NFR BLD AUTO: 66.6 %
PLATELET # BLD AUTO: 533 K/UL
POTASSIUM SERPL-SCNC: 4.6 MMOL/L
PROT SERPL-MCNC: 6.8 G/DL
RBC # BLD: 4.26 M/UL
RBC # FLD: 13.2 %
SODIUM SERPL-SCNC: 140 MMOL/L
TSH SERPL-ACNC: 1.52 UIU/ML
VIT B12 SERPL-MCNC: 654 PG/ML
WBC # FLD AUTO: 7.14 K/UL

## 2021-12-10 ENCOUNTER — TRANSCRIPTION ENCOUNTER (OUTPATIENT)
Age: 71
End: 2021-12-10

## 2022-01-05 ENCOUNTER — TRANSCRIPTION ENCOUNTER (OUTPATIENT)
Age: 72
End: 2022-01-05

## 2022-01-18 ENCOUNTER — TRANSCRIPTION ENCOUNTER (OUTPATIENT)
Age: 72
End: 2022-01-18

## 2022-02-23 ENCOUNTER — LABORATORY RESULT (OUTPATIENT)
Age: 72
End: 2022-02-23

## 2022-02-28 LAB
BASOPHILS # BLD AUTO: 0.05 K/UL
BASOPHILS NFR BLD AUTO: 0.9 %
EOSINOPHIL # BLD AUTO: 0.11 K/UL
EOSINOPHIL NFR BLD AUTO: 2 %
HCT VFR BLD CALC: 43.6 %
HGB BLD-MCNC: 14.8 G/DL
IMM GRANULOCYTES NFR BLD AUTO: 4.4 %
LYMPHOCYTES # BLD AUTO: 1.41 K/UL
LYMPHOCYTES NFR BLD AUTO: 25 %
MAN DIFF?: NORMAL
MCHC RBC-ENTMCNC: 33.9 GM/DL
MCHC RBC-ENTMCNC: 37.7 PG
MCV RBC AUTO: 110.9 FL
MONOCYTES # BLD AUTO: 0.51 K/UL
MONOCYTES NFR BLD AUTO: 9 %
NEUTROPHILS # BLD AUTO: 3.31 K/UL
NEUTROPHILS NFR BLD AUTO: 58.7 %
PLATELET # BLD AUTO: 513 K/UL
RBC # BLD: 3.93 M/UL
RBC # FLD: 12.6 %
WBC # FLD AUTO: 5.64 K/UL

## 2022-03-02 ENCOUNTER — TRANSCRIPTION ENCOUNTER (OUTPATIENT)
Age: 72
End: 2022-03-02

## 2022-04-03 ENCOUNTER — RX RENEWAL (OUTPATIENT)
Age: 72
End: 2022-04-03

## 2022-05-04 ENCOUNTER — APPOINTMENT (OUTPATIENT)
Dept: HEART AND VASCULAR | Facility: CLINIC | Age: 72
End: 2022-05-04
Payer: MEDICARE

## 2022-05-04 ENCOUNTER — LABORATORY RESULT (OUTPATIENT)
Age: 72
End: 2022-05-04

## 2022-05-04 VITALS
HEIGHT: 68.5 IN | DIASTOLIC BLOOD PRESSURE: 80 MMHG | HEART RATE: 68 BPM | OXYGEN SATURATION: 99 % | BODY MASS INDEX: 28.02 KG/M2 | SYSTOLIC BLOOD PRESSURE: 134 MMHG | TEMPERATURE: 97.2 F | WEIGHT: 187 LBS

## 2022-05-04 DIAGNOSIS — I25.3 ANEURYSM OF HEART: ICD-10-CM

## 2022-05-04 DIAGNOSIS — R73.03 PREDIABETES.: ICD-10-CM

## 2022-05-04 DIAGNOSIS — E55.9 VITAMIN D DEFICIENCY, UNSPECIFIED: ICD-10-CM

## 2022-05-04 DIAGNOSIS — Z80.41 FAMILY HISTORY OF MALIGNANT NEOPLASM OF OVARY: ICD-10-CM

## 2022-05-04 PROCEDURE — 36415 COLL VENOUS BLD VENIPUNCTURE: CPT

## 2022-05-04 PROCEDURE — 99214 OFFICE O/P EST MOD 30 MIN: CPT

## 2022-05-04 RX ORDER — WARFARIN 5 MG/1
5 TABLET ORAL
Qty: 128 | Refills: 1 | Status: DISCONTINUED | COMMUNITY
Start: 2020-07-21 | End: 2022-05-04

## 2022-05-04 RX ORDER — PANTOPRAZOLE 40 MG/1
40 TABLET, DELAYED RELEASE ORAL DAILY
Qty: 1 | Refills: 1 | Status: DISCONTINUED | COMMUNITY
End: 2022-05-04

## 2022-05-10 ENCOUNTER — TRANSCRIPTION ENCOUNTER (OUTPATIENT)
Age: 72
End: 2022-05-10

## 2022-05-19 LAB
25(OH)D3 SERPL-MCNC: 27.8 NG/ML
ALBUMIN SERPL ELPH-MCNC: 4.7 G/DL
ALP BLD-CCNC: 101 U/L
ALT SERPL-CCNC: 34 U/L
ANION GAP SERPL CALC-SCNC: 15 MMOL/L
AST SERPL-CCNC: 22 U/L
BASOPHILS # BLD AUTO: 0.04 K/UL
BASOPHILS NFR BLD AUTO: 0.6 %
BILIRUB SERPL-MCNC: 0.6 MG/DL
BUN SERPL-MCNC: 19 MG/DL
CALCIUM SERPL-MCNC: 9.8 MG/DL
CHLORIDE SERPL-SCNC: 103 MMOL/L
CHOLEST SERPL-MCNC: 121 MG/DL
CO2 SERPL-SCNC: 24 MMOL/L
COVID-19 NUCLEOCAPSID  GAM ANTIBODY INTERPRETATION: POSITIVE
COVID-19 SPIKE DOMAIN ANTIBODY INTERPRETATION: POSITIVE
CREAT SERPL-MCNC: 0.91 MG/DL
EGFR: 91 ML/MIN/1.73M2
EOSINOPHIL # BLD AUTO: 0.07 K/UL
EOSINOPHIL NFR BLD AUTO: 1 %
ESTIMATED AVERAGE GLUCOSE: 117 MG/DL
GLUCOSE SERPL-MCNC: 83 MG/DL
HBA1C MFR BLD HPLC: 5.7 %
HCT VFR BLD CALC: 49.9 %
HDLC SERPL-MCNC: 39 MG/DL
HGB BLD-MCNC: 16.4 G/DL
IMM GRANULOCYTES NFR BLD AUTO: 0.3 %
LDLC SERPL CALC-MCNC: 67 MG/DL
LYMPHOCYTES # BLD AUTO: 1.46 K/UL
LYMPHOCYTES NFR BLD AUTO: 21.5 %
MAN DIFF?: NORMAL
MCHC RBC-ENTMCNC: 32.9 GM/DL
MCHC RBC-ENTMCNC: 37.2 PG
MCV RBC AUTO: 113.2 FL
MONOCYTES # BLD AUTO: 0.49 K/UL
MONOCYTES NFR BLD AUTO: 7.2 %
NEUTROPHILS # BLD AUTO: 4.71 K/UL
NEUTROPHILS NFR BLD AUTO: 69.4 %
NONHDLC SERPL-MCNC: 82 MG/DL
PLATELET # BLD AUTO: 451 K/UL
POTASSIUM SERPL-SCNC: 4.2 MMOL/L
PROT SERPL-MCNC: 7.3 G/DL
RBC # BLD: 4.41 M/UL
RBC # FLD: 13.2 %
SARS-COV-2 AB SERPL IA-ACNC: >250 U/ML
SARS-COV-2 AB SERPL QL IA: 2.1 INDEX
SODIUM SERPL-SCNC: 142 MMOL/L
TRIGL SERPL-MCNC: 71 MG/DL
WBC # FLD AUTO: 6.79 K/UL

## 2022-06-02 ENCOUNTER — TRANSCRIPTION ENCOUNTER (OUTPATIENT)
Age: 72
End: 2022-06-02

## 2022-06-03 ENCOUNTER — TRANSCRIPTION ENCOUNTER (OUTPATIENT)
Age: 72
End: 2022-06-03

## 2022-06-07 ENCOUNTER — RX RENEWAL (OUTPATIENT)
Age: 72
End: 2022-06-07

## 2022-06-08 ENCOUNTER — APPOINTMENT (OUTPATIENT)
Dept: HEMATOLOGY ONCOLOGY | Facility: CLINIC | Age: 72
End: 2022-06-08

## 2022-06-23 PROBLEM — Z80.41 FAMILY HISTORY OF MALIGNANT NEOPLASM OF OVARY: Status: ACTIVE | Noted: 2022-06-23

## 2022-06-23 NOTE — REVIEW OF SYSTEMS
[Weight Gain (___ Lbs)] : [unfilled] ~Ulb weight gain [Easy Bruising] : a tendency for easy bruising [Negative] : Psychiatric [Weight Loss (___ Lbs)] : no recent weight loss [de-identified] : mild orthostasis

## 2022-06-23 NOTE — HISTORY OF PRESENT ILLNESS
[FreeTextEntry1] : No chest pain, syncope, orthopnea, or sustained palpitations\par \par No bleeding \par \par \par Walks miles every day \par \par Compliant with all medications \par \par Had uncomplicated covid infection in early January 2022

## 2022-06-23 NOTE — PHYSICAL EXAM
[Well Developed] : well developed [Well Nourished] : well nourished [No Acute Distress] : no acute distress [Normal Conjunctiva] : normal conjunctiva [No Xanthelasma] : no xanthelasma [Normal Venous Pressure] : normal venous pressure [No Carotid Bruit] : no carotid bruit [Normal S1, S2] : normal S1, S2 [No Murmur] : no murmur [No Rub] : no rub [No Gallop] : no gallop [Clear Lung Fields] : clear lung fields [Good Air Entry] : good air entry [No Respiratory Distress] : no respiratory distress  [Soft] : abdomen soft [Non Tender] : non-tender [No Masses/organomegaly] : no masses/organomegaly [Normal Bowel Sounds] : normal bowel sounds [Normal Gait] : normal gait [Gait - Sufficient for Exercise Testing] : gait - sufficient for exercise testing [No Edema] : no edema [No Cyanosis] : no cyanosis [No Clubbing] : no clubbing [No Varicosities] : no varicosities [No Rash] : no rash [No Skin Lesions] : no skin lesions [Moves all extremities] : moves all extremities [No Focal Deficits] : no focal deficits [Normal Speech] : normal speech [Alert and Oriented] : alert and oriented [Normal memory] : normal memory [de-identified] : anicteric  [de-identified] : weak carotid pulse

## 2022-06-23 NOTE — REASON FOR VISIT
[Symptom and Test Evaluation] : symptom and test evaluation [Hyperlipidemia] : hyperlipidemia [Hypertension] : hypertension [Family Member] : family member [FreeTextEntry1] : 72  year old Richard male with hypertension  s/p  anterior wall mi  complicated by large LV thrombus was discharged June 22 nd, 2020  after successful PTCA/VENESSA of p LAD and prox and mid RCA. \par \par Diagnosed with ANGELINA 2 thrombocytosis  and is on  Hydrea  with good response \par \par \par He is off  warfarin  but   continues and aspirin 81mg and clopidogrel. \par \par \par In retrospect, his myocardial infarction was likely triggered by thrombocytosis \par \par

## 2022-06-23 NOTE — DISCUSSION/SUMMARY
[Coronary Artery Disease] : coronary artery disease [Stable] : stable [None] : There are no changes in medication management [Hyperlipidemia] : hyperlipidemia [Lipids Test Panel] : a fasting lipid profile [Known CAD] : known coronary artery disease [Hypertension] : hypertension [With Me] : with me [___ Month(s)] : in [unfilled] month(s) [FreeTextEntry1] : venipuncture performed in office  with lipids, A1c, covid Ab, TSH, etc \par \par medications reconciled and renewed\par \par reinforced low saturated fat/ high fiber diet with regular brisk walking exercise

## 2022-08-30 ENCOUNTER — TRANSCRIPTION ENCOUNTER (OUTPATIENT)
Age: 72
End: 2022-08-30

## 2022-09-13 LAB
25(OH)D3 SERPL-MCNC: 38.2 NG/ML
ALBUMIN SERPL ELPH-MCNC: 4.3 G/DL
ALP BLD-CCNC: 87 U/L
ALT SERPL-CCNC: 40 U/L
ANION GAP SERPL CALC-SCNC: 11 MMOL/L
AST SERPL-CCNC: 23 U/L
BASOPHILS # BLD AUTO: 0.04 K/UL
BASOPHILS NFR BLD AUTO: 0.5 %
BILIRUB SERPL-MCNC: 0.4 MG/DL
BUN SERPL-MCNC: 23 MG/DL
CALCIUM SERPL-MCNC: 9.2 MG/DL
CHLORIDE SERPL-SCNC: 103 MMOL/L
CO2 SERPL-SCNC: 25 MMOL/L
CREAT SERPL-MCNC: 0.95 MG/DL
EGFR: 86 ML/MIN/1.73M2
EOSINOPHIL # BLD AUTO: 0.15 K/UL
EOSINOPHIL NFR BLD AUTO: 2.1 %
ERYTHROCYTE [SEDIMENTATION RATE] IN BLOOD BY WESTERGREN METHOD: 2 MM/HR
FERRITIN SERPL-MCNC: 244 NG/ML
GLUCOSE SERPL-MCNC: 85 MG/DL
HCT VFR BLD CALC: 49.7 %
HGB BLD-MCNC: 15.5 G/DL
IMM GRANULOCYTES NFR BLD AUTO: 0.3 %
IRON SATN MFR SERPL: 37 %
IRON SERPL-MCNC: 112 UG/DL
LDH SERPL-CCNC: 240 U/L
LYMPHOCYTES # BLD AUTO: 1.47 K/UL
LYMPHOCYTES NFR BLD AUTO: 20.1 %
MAN DIFF?: NORMAL
MCHC RBC-ENTMCNC: 31.2 GM/DL
MCHC RBC-ENTMCNC: 36.6 PG
MCV RBC AUTO: 117.5 FL
MONOCYTES # BLD AUTO: 0.6 K/UL
MONOCYTES NFR BLD AUTO: 8.2 %
NEUTROPHILS # BLD AUTO: 5.03 K/UL
NEUTROPHILS NFR BLD AUTO: 68.8 %
PLATELET # BLD AUTO: 459 K/UL
POTASSIUM SERPL-SCNC: 5 MMOL/L
PROT SERPL-MCNC: 6.7 G/DL
RBC # BLD: 4.23 M/UL
RBC # FLD: 14.1 %
SODIUM SERPL-SCNC: 139 MMOL/L
TIBC SERPL-MCNC: 304 UG/DL
TSH SERPL-ACNC: 1.75 UIU/ML
UIBC SERPL-MCNC: 192 UG/DL
VIT B12 SERPL-MCNC: 456 PG/ML
WBC # FLD AUTO: 7.31 K/UL

## 2022-09-14 ENCOUNTER — APPOINTMENT (OUTPATIENT)
Dept: HEMATOLOGY ONCOLOGY | Facility: CLINIC | Age: 72
End: 2022-09-14

## 2022-09-14 VITALS
OXYGEN SATURATION: 99 % | DIASTOLIC BLOOD PRESSURE: 83 MMHG | BODY MASS INDEX: 27.57 KG/M2 | TEMPERATURE: 97.1 F | HEIGHT: 68.5 IN | WEIGHT: 184 LBS | HEART RATE: 69 BPM | SYSTOLIC BLOOD PRESSURE: 144 MMHG

## 2022-09-14 PROCEDURE — 99214 OFFICE O/P EST MOD 30 MIN: CPT

## 2022-09-14 NOTE — ASSESSMENT
[FreeTextEntry1] : Patient's recent platelet count 459,000, i.e. in desired range.\par \par Patient is to continue Hydrea 1000 mg daily... Continue aspirin 81 mg daily...\par \par Have repeat blood work every 3 months.\par \par Follow-up here in 6 to 12 months or sooner if needed.

## 2022-09-14 NOTE — HISTORY OF PRESENT ILLNESS
[de-identified] : 70 years old male past medical history significant for hypertension... Patient was noted to have an acute myocardial infarction complicated by a thrombosis during Corona time... He was also found to have increased platelet count and Ramez 2 positivity consistent with essential thrombocytosis... He was sent in for hematological consultation [de-identified] : 8/18/2020 patient returns for follow-up with his son... He has started on Hydrea 500 mg daily... He states he might be a little tired from it however he is willing to continue... Repeat CBC platelet count went up to 900,000...\par \par 9/29/2020 patient returns to the office for follow-up for his essential thrombocytosis... He states he has been taking the Hydrea at thousand milligram, and was actually very upset that his last CBC on 915 the platelet count went up to 800,000... I reassured patient that we are trying to get his platelet count down gradually... and if increase the Hydrea dose to fast he might develop anemia, which will worsen his coronary artery disease\par \par February 25, 2021 patient requested a telehealth appointment to discuss his condition... He has no new complaints,,, he has been getting his blood tested every 2 weeks,,,\par \par December 8, 2021 patient is here for follow-up ... Doing well is off Coumadin... Taking baby aspirin daily... Platelet count is in 4-500,000 range\par \par \par September 14, 2022 returns for follow-up... Doing well no complaints... Taking Hydrea 1000 mg p.o. daily

## 2022-09-14 NOTE — CONSULT LETTER
[Consult Letter:] : I had the pleasure of evaluating your patient, [unfilled]. [Please see my note below.] : Please see my note below. [Consult Closing:] : Thank you very much for allowing me to participate in the care of this patient.  If you have any questions, please do not hesitate to contact me. [Sincerely,] : Sincerely, [Dear  ___] : Dear  [unfilled], [FreeTextEntry3] : Argenis Cervantes MD\par

## 2022-11-07 NOTE — ED ADULT TRIAGE NOTE - SPO2 (%)
Mariluz Klein is a 77 y.o. male accompanied by himself  CC:   Chief Complaint   Patient presents with    Anxiety     Wife has a form of parkinson's which they have been fighting; has been having a lot of anxiety to the point that he has been dry heaving       Patient came to the Medicare today for evaluation of his anxiety. He has been exceptionally anxious because of his wife's parkinsonism  As of late there have been going through extra stressors, she has been hospitalized several times, as well as is now in a long-term care facility because of her medical situation. The patient states that when he gets this nervous in tends to dry heave. He is here today wondering if there is not anything that he can use on an as-needed basis to help with his anxiety attacks. He has no other complaints today. Patient's past medical, surgical, social and/or family history reviewed, updated in chart, and are non-contributory (unless otherwise stated). Medications and allergies also reviewed and updated in chart. /84   Pulse 69   Temp 97.6 °F (36.4 °C) (Temporal)   Ht 5' 10\" (1.778 m)   Wt 273 lb (123.8 kg)   SpO2 97%   BMI 39.17 kg/m²     Review of Systems   Constitutional:  Negative for chills, fatigue and fever. HENT:  Negative for congestion, ear pain, facial swelling, rhinorrhea, sinus pressure and sinus pain. Eyes:  Negative for photophobia and visual disturbance. Respiratory:  Negative for apnea, cough, chest tightness and shortness of breath. Cardiovascular:  Negative for chest pain and palpitations. Gastrointestinal:  Negative for abdominal distention, blood in stool, constipation, diarrhea and vomiting. Endocrine: Negative for cold intolerance, polydipsia, polyphagia and polyuria. Genitourinary:  Negative for decreased urine volume, frequency and urgency. Musculoskeletal:  Negative for arthralgias and gait problem. Skin:  Negative for color change. Allergic/Immunologic: Negative for environmental allergies and food allergies. Neurological:  Negative for dizziness, light-headedness and headaches. Hematological:  Negative for adenopathy. Psychiatric/Behavioral:  Negative for agitation and confusion. Physical Exam  Constitutional:       Appearance: Normal appearance. HENT:      Head: Normocephalic and atraumatic. Right Ear: External ear normal.      Left Ear: External ear normal.      Nose: Nose normal. No congestion or rhinorrhea. Eyes:      Extraocular Movements: Extraocular movements intact. Pupils: Pupils are equal, round, and reactive to light. Cardiovascular:      Rate and Rhythm: Normal rate and regular rhythm. Heart sounds: No murmur heard. No friction rub. No gallop. Pulmonary:      Effort: Pulmonary effort is normal.      Breath sounds: Normal breath sounds. Chest:      Chest wall: No tenderness. Abdominal:      General: Abdomen is flat. Bowel sounds are normal. There is no distension. Palpations: Abdomen is soft. Tenderness: There is no abdominal tenderness. Musculoskeletal:         General: Normal range of motion. Cervical back: Normal range of motion. Skin:     General: Skin is warm and dry. Neurological:      General: No focal deficit present. Mental Status: He is alert and oriented to person, place, and time. Psychiatric:         Mood and Affect: Mood normal.       Assessment:  Parmjit Russell was seen today for anxiety. Diagnoses and all orders for this visit:    Anxiety  -     LORazepam (ATIVAN) 0.5 MG tablet; Take 1 tablet by mouth every 8 hours as needed for Anxiety for up to 30 days. I did decide to put the patient on a as needed course of Ativan today. Based on what he told me and the fact that his wife may be coming home soon the patient and I discussed him never using this medication. Which would certainly fine by me.   However now he does have something for if he needs something in the short-term. We did discuss a daily medication control. The patient at this point time was resistant to this, however he did say that he would continue to think about it. Follow Up:  No follow-ups on file. As above. Call or go to ED immediately if symptoms worsen or persist.  No follow-ups on file. , or sooner if necessary. Educational materials and/or home exercises printed for patient's review and were included in patient instructions on his/her After Visit Summary and given to patient at the end ofvisit. Counseled regarding above diagnosis, including possible risks and complications,  especially if left uncontrolled. Counseledregarding the possible side effects, risks, benefits and alternatives to treatment; patient and/or guardian verbalizes understanding, agrees, feels comfortable with and wishes to proceed with above treatment plan. patient to call with any new medication issues, and read all Rx info from pharmacy to assure aware of all possible risks and side effects of medication before taking. Patient and/or guardian verbalizes understanding and agrees with above counseling,assessment and plan. All questions answered. 99

## 2022-11-28 ENCOUNTER — TRANSCRIPTION ENCOUNTER (OUTPATIENT)
Age: 72
End: 2022-11-28

## 2022-12-03 LAB
ALBUMIN SERPL ELPH-MCNC: 4.3 G/DL
ALP BLD-CCNC: 101 U/L
ALT SERPL-CCNC: 38 U/L
ANION GAP SERPL CALC-SCNC: 13 MMOL/L
AST SERPL-CCNC: 22 U/L
BILIRUB SERPL-MCNC: 0.4 MG/DL
BUN SERPL-MCNC: 21 MG/DL
CALCIUM SERPL-MCNC: 9.5 MG/DL
CHLORIDE SERPL-SCNC: 103 MMOL/L
CO2 SERPL-SCNC: 26 MMOL/L
CREAT SERPL-MCNC: 0.93 MG/DL
EGFR: 88 ML/MIN/1.73M2
GLUCOSE SERPL-MCNC: 78 MG/DL
POTASSIUM SERPL-SCNC: 4.3 MMOL/L
PROT SERPL-MCNC: 6.7 G/DL
SODIUM SERPL-SCNC: 142 MMOL/L
VIT B12 SERPL-MCNC: 922 PG/ML

## 2022-12-12 ENCOUNTER — TRANSCRIPTION ENCOUNTER (OUTPATIENT)
Age: 72
End: 2022-12-12

## 2023-03-14 ENCOUNTER — TRANSCRIPTION ENCOUNTER (OUTPATIENT)
Age: 73
End: 2023-03-14

## 2023-03-30 ENCOUNTER — APPOINTMENT (OUTPATIENT)
Dept: HEMATOLOGY ONCOLOGY | Facility: CLINIC | Age: 73
End: 2023-03-30
Payer: MEDICARE

## 2023-03-30 PROCEDURE — 99213 OFFICE O/P EST LOW 20 MIN: CPT | Mod: 95

## 2023-03-30 NOTE — ASSESSMENT
[FreeTextEntry1] : Patient's  platelet count 459,000, i.e. in desired range.... In January 2023\par \par Patient is to continue Hydrea 1000 mg daily... Continue aspirin 81 mg daily...\par \par Have repeat blood work this month \par \par Follow-up with Dr. Springer in August 2023.

## 2023-03-30 NOTE — HISTORY OF PRESENT ILLNESS
[de-identified] : 70 years old male past medical history significant for hypertension... Patient was noted to have an acute myocardial infarction complicated by a thrombosis during Corona time... He was also found to have increased platelet count and Ramez 2 positivity consistent with essential thrombocytosis... He was sent in for hematological consultation [de-identified] : 8/18/2020 patient returns for follow-up with his son... He has started on Hydrea 500 mg daily... He states he might be a little tired from it however he is willing to continue... Repeat CBC platelet count went up to 900,000...\par \par 9/29/2020 patient returns to the office for follow-up for his essential thrombocytosis... He states he has been taking the Hydrea at thousand milligram, and was actually very upset that his last CBC on 915 the platelet count went up to 800,000... I reassured patient that we are trying to get his platelet count down gradually... and if increase the Hydrea dose to fast he might develop anemia, which will worsen his coronary artery disease\par \par February 25, 2021 patient requested a telehealth appointment to discuss his condition... He has no new complaints,,, he has been getting his blood tested every 2 weeks,,,\par \par December 8, 2021 patient is here for follow-up ... Doing well is off Coumadin... Taking baby aspirin daily... Platelet count is in 4-500,000 range\par \par \par September 14, 2022 returns for follow-up... Doing well no complaints... Taking Hydrea 1000 mg p.o. daily\par \par March 30, 2023 patient with ET on Hydrea returns for follow-up... No new complaints doing well.

## 2023-04-03 ENCOUNTER — LABORATORY RESULT (OUTPATIENT)
Age: 73
End: 2023-04-03

## 2023-04-08 ENCOUNTER — TRANSCRIPTION ENCOUNTER (OUTPATIENT)
Age: 73
End: 2023-04-08

## 2023-04-11 LAB
ALBUMIN SERPL ELPH-MCNC: 4.3 G/DL
ALP BLD-CCNC: 92 U/L
ALT SERPL-CCNC: 36 U/L
ANION GAP SERPL CALC-SCNC: 11 MMOL/L
AST SERPL-CCNC: 28 U/L
BASOPHILS # BLD AUTO: 0.02 K/UL
BASOPHILS NFR BLD AUTO: 0.3 %
BILIRUB SERPL-MCNC: 0.4 MG/DL
BUN SERPL-MCNC: 21 MG/DL
CALCIUM SERPL-MCNC: 9.1 MG/DL
CHLORIDE SERPL-SCNC: 102 MMOL/L
CO2 SERPL-SCNC: 26 MMOL/L
CREAT SERPL-MCNC: 0.93 MG/DL
EGFR: 88 ML/MIN/1.73M2
EOSINOPHIL # BLD AUTO: 0.11 K/UL
EOSINOPHIL NFR BLD AUTO: 1.6 %
ERYTHROCYTE [SEDIMENTATION RATE] IN BLOOD BY WESTERGREN METHOD: 10 MM/HR
GLUCOSE SERPL-MCNC: 87 MG/DL
HCT VFR BLD CALC: 45.8 %
HGB BLD-MCNC: 14.9 G/DL
IMM GRANULOCYTES NFR BLD AUTO: 0.1 %
LDH SERPL-CCNC: 286 U/L
LYMPHOCYTES # BLD AUTO: 1.77 K/UL
LYMPHOCYTES NFR BLD AUTO: 25 %
MAN DIFF?: NORMAL
MCHC RBC-ENTMCNC: 32.5 GM/DL
MCHC RBC-ENTMCNC: 37 PG
MCV RBC AUTO: 113.6 FL
MONOCYTES # BLD AUTO: 0.49 K/UL
MONOCYTES NFR BLD AUTO: 6.9 %
NEUTROPHILS # BLD AUTO: 4.69 K/UL
NEUTROPHILS NFR BLD AUTO: 66.1 %
PLATELET # BLD AUTO: 325 K/UL
POTASSIUM SERPL-SCNC: 4.4 MMOL/L
PROT SERPL-MCNC: 6.7 G/DL
RBC # BLD: 4.03 M/UL
RBC # FLD: 13.1 %
SODIUM SERPL-SCNC: 139 MMOL/L
WBC # FLD AUTO: 7.09 K/UL

## 2023-04-20 ENCOUNTER — TRANSCRIPTION ENCOUNTER (OUTPATIENT)
Age: 73
End: 2023-04-20

## 2023-04-21 ENCOUNTER — TRANSCRIPTION ENCOUNTER (OUTPATIENT)
Age: 73
End: 2023-04-21

## 2023-07-11 NOTE — ASSESSMENT
"Rapid Response Nurse Follow-up Note     Followed up with patient for proactive rounding. BP improved. Head ache stated by patient to be caused by "needing to sleep". Resolved. No L sided weakness.  No acute issues at this time. Reviewed plan of care with bedside RNNanda .   Team will continue to follow.  Please call Rapid Response RN, Roman Haq RN with any questions or concerns at [phone].      " [FreeTextEntry1] : stable hemodynamics\par \par ANGELINA 2 thrombocytosis \par \par LV mural thrombus \par \par Ischemic cardiomyopathy

## 2023-07-20 ENCOUNTER — APPOINTMENT (OUTPATIENT)
Dept: HEART AND VASCULAR | Facility: CLINIC | Age: 73
End: 2023-07-20

## 2023-10-04 ENCOUNTER — LABORATORY RESULT (OUTPATIENT)
Age: 73
End: 2023-10-04

## 2023-10-04 ENCOUNTER — NON-APPOINTMENT (OUTPATIENT)
Age: 73
End: 2023-10-04

## 2023-10-04 ENCOUNTER — APPOINTMENT (OUTPATIENT)
Dept: HEART AND VASCULAR | Facility: CLINIC | Age: 73
End: 2023-10-04
Payer: MEDICARE

## 2023-10-04 VITALS
BODY MASS INDEX: 26.51 KG/M2 | SYSTOLIC BLOOD PRESSURE: 140 MMHG | WEIGHT: 179 LBS | TEMPERATURE: 98.1 F | HEIGHT: 69 IN | DIASTOLIC BLOOD PRESSURE: 73 MMHG | OXYGEN SATURATION: 97 % | HEART RATE: 61 BPM

## 2023-10-04 DIAGNOSIS — I25.5 ISCHEMIC CARDIOMYOPATHY: ICD-10-CM

## 2023-10-04 DIAGNOSIS — R06.00 DYSPNEA, UNSPECIFIED: ICD-10-CM

## 2023-10-04 DIAGNOSIS — E78.5 HYPERLIPIDEMIA, UNSPECIFIED: ICD-10-CM

## 2023-10-04 PROCEDURE — 99215 OFFICE O/P EST HI 40 MIN: CPT | Mod: 25

## 2023-10-04 PROCEDURE — 93000 ELECTROCARDIOGRAM COMPLETE: CPT

## 2023-10-04 PROCEDURE — 36415 COLL VENOUS BLD VENIPUNCTURE: CPT

## 2023-10-04 RX ORDER — METOPROLOL SUCCINATE 25 MG/1
25 TABLET, EXTENDED RELEASE ORAL
Qty: 90 | Refills: 3 | Status: ACTIVE | COMMUNITY
Start: 2021-04-06 | End: 1900-01-01

## 2023-10-04 RX ORDER — ATORVASTATIN CALCIUM 80 MG/1
80 TABLET, FILM COATED ORAL
Qty: 90 | Refills: 3 | Status: ACTIVE | COMMUNITY
Start: 2021-04-06 | End: 1900-01-01

## 2023-10-04 RX ORDER — PANTOPRAZOLE 20 MG/1
20 TABLET, DELAYED RELEASE ORAL DAILY
Qty: 90 | Refills: 3 | Status: ACTIVE | COMMUNITY
Start: 2021-06-22 | End: 1900-01-01

## 2023-10-04 RX ORDER — FUROSEMIDE 20 MG/1
20 TABLET ORAL
Qty: 90 | Refills: 3 | Status: ACTIVE | COMMUNITY
Start: 2022-04-03 | End: 1900-01-01

## 2023-10-04 RX ORDER — CLOPIDOGREL BISULFATE 75 MG/1
75 TABLET, FILM COATED ORAL
Qty: 90 | Refills: 2 | Status: ACTIVE | COMMUNITY
Start: 2022-06-07 | End: 1900-01-01

## 2023-10-05 LAB
ALBUMIN SERPL ELPH-MCNC: 4.3 G/DL
ALP BLD-CCNC: 78 U/L
ALT SERPL-CCNC: 34 U/L
ANION GAP SERPL CALC-SCNC: 10 MMOL/L
APPEARANCE: CLEAR
AST SERPL-CCNC: 24 U/L
BASOPHILS # BLD AUTO: 0.05 K/UL
BASOPHILS NFR BLD AUTO: 0.6 %
BILIRUB SERPL-MCNC: 0.5 MG/DL
BILIRUBIN URINE: NEGATIVE
BLOOD URINE: ABNORMAL
BUN SERPL-MCNC: 27 MG/DL
CALCIUM SERPL-MCNC: 9.5 MG/DL
CHLORIDE SERPL-SCNC: 104 MMOL/L
CHOLEST SERPL-MCNC: 101 MG/DL
CO2 SERPL-SCNC: 26 MMOL/L
COLOR: YELLOW
CREAT SERPL-MCNC: 0.88 MG/DL
CREAT SPEC-SCNC: 115 MG/DL
EGFR: 91 ML/MIN/1.73M2
EOSINOPHIL # BLD AUTO: 0.14 K/UL
EOSINOPHIL NFR BLD AUTO: 1.8 %
ESTIMATED AVERAGE GLUCOSE: 117 MG/DL
GLUCOSE QUALITATIVE U: NEGATIVE MG/DL
GLUCOSE SERPL-MCNC: 95 MG/DL
HBA1C MFR BLD HPLC: 5.7 %
HCT VFR BLD CALC: 46 %
HDLC SERPL-MCNC: 36 MG/DL
HGB BLD-MCNC: 15.2 G/DL
IMM GRANULOCYTES NFR BLD AUTO: 0.4 %
KETONES URINE: NEGATIVE MG/DL
LDLC SERPL CALC-MCNC: 47 MG/DL
LEUKOCYTE ESTERASE URINE: ABNORMAL
LYMPHOCYTES # BLD AUTO: 1.85 K/UL
LYMPHOCYTES NFR BLD AUTO: 23.8 %
MAN DIFF?: NORMAL
MCHC RBC-ENTMCNC: 33 GM/DL
MCHC RBC-ENTMCNC: 35.9 PG
MCV RBC AUTO: 108.7 FL
MICROALBUMIN 24H UR DL<=1MG/L-MCNC: <1.2 MG/DL
MICROALBUMIN/CREAT 24H UR-RTO: NORMAL MG/G
MONOCYTES # BLD AUTO: 0.46 K/UL
MONOCYTES NFR BLD AUTO: 5.9 %
NEUTROPHILS # BLD AUTO: 5.23 K/UL
NEUTROPHILS NFR BLD AUTO: 67.5 %
NITRITE URINE: NEGATIVE
NONHDLC SERPL-MCNC: 65 MG/DL
PH URINE: 6.5
PLATELET # BLD AUTO: 542 K/UL
POTASSIUM SERPL-SCNC: 5.6 MMOL/L
PROT SERPL-MCNC: 6.8 G/DL
PROTEIN URINE: NEGATIVE MG/DL
RBC # BLD: 4.23 M/UL
RBC # FLD: 13.2 %
SODIUM SERPL-SCNC: 140 MMOL/L
SPECIFIC GRAVITY URINE: 1.02
TRIGL SERPL-MCNC: 91 MG/DL
TSH SERPL-ACNC: 0.89 UIU/ML
UROBILINOGEN URINE: 0.2 MG/DL
WBC # FLD AUTO: 7.76 K/UL

## 2023-10-16 ENCOUNTER — APPOINTMENT (OUTPATIENT)
Dept: HEART AND VASCULAR | Facility: CLINIC | Age: 73
End: 2023-10-16
Payer: MEDICARE

## 2023-10-16 VITALS — BODY MASS INDEX: 26.51 KG/M2 | HEIGHT: 69 IN | WEIGHT: 179 LBS

## 2023-10-16 VITALS — SYSTOLIC BLOOD PRESSURE: 150 MMHG | DIASTOLIC BLOOD PRESSURE: 88 MMHG

## 2023-10-16 DIAGNOSIS — I25.10 ATHEROSCLEROTIC HEART DISEASE OF NATIVE CORONARY ARTERY W/OUT ANGINA PECTORIS: ICD-10-CM

## 2023-10-16 DIAGNOSIS — I20.9 ANGINA PECTORIS, UNSPECIFIED: ICD-10-CM

## 2023-10-16 PROCEDURE — A9500: CPT

## 2023-10-16 PROCEDURE — 99214 OFFICE O/P EST MOD 30 MIN: CPT | Mod: 25

## 2023-10-16 PROCEDURE — 78452 HT MUSCLE IMAGE SPECT MULT: CPT

## 2023-10-16 PROCEDURE — 93306 TTE W/DOPPLER COMPLETE: CPT

## 2023-10-16 PROCEDURE — 93015 CV STRESS TEST SUPVJ I&R: CPT

## 2023-10-17 ENCOUNTER — OUTPATIENT (OUTPATIENT)
Dept: OUTPATIENT SERVICES | Facility: HOSPITAL | Age: 73
LOS: 1 days | Discharge: HOME | End: 2023-10-17

## 2023-10-17 ENCOUNTER — TRANSCRIPTION ENCOUNTER (OUTPATIENT)
Age: 73
End: 2023-10-17

## 2023-10-17 DIAGNOSIS — D47.1 CHRONIC MYELOPROLIFERATIVE DISEASE: ICD-10-CM

## 2023-10-18 ENCOUNTER — RESULT REVIEW (OUTPATIENT)
Age: 73
End: 2023-10-18

## 2023-10-18 ENCOUNTER — APPOINTMENT (OUTPATIENT)
Dept: HEMATOLOGY ONCOLOGY | Facility: CLINIC | Age: 73
End: 2023-10-18
Payer: MEDICARE

## 2023-10-18 VITALS
BODY MASS INDEX: 27.46 KG/M2 | DIASTOLIC BLOOD PRESSURE: 85 MMHG | HEIGHT: 69 IN | TEMPERATURE: 97.1 F | SYSTOLIC BLOOD PRESSURE: 134 MMHG | HEART RATE: 62 BPM | OXYGEN SATURATION: 98 % | WEIGHT: 185.41 LBS | RESPIRATION RATE: 16 BRPM

## 2023-10-18 LAB
BASOPHILS # BLD AUTO: 0.07 K/UL — SIGNIFICANT CHANGE UP (ref 0–0.2)
BASOPHILS # BLD AUTO: 0.07 K/UL — SIGNIFICANT CHANGE UP (ref 0–0.2)
BASOPHILS NFR BLD AUTO: 0.8 % — SIGNIFICANT CHANGE UP (ref 0–2)
BASOPHILS NFR BLD AUTO: 0.8 % — SIGNIFICANT CHANGE UP (ref 0–2)
EOSINOPHIL # BLD AUTO: 0.23 K/UL — SIGNIFICANT CHANGE UP (ref 0–0.5)
EOSINOPHIL # BLD AUTO: 0.23 K/UL — SIGNIFICANT CHANGE UP (ref 0–0.5)
EOSINOPHIL NFR BLD AUTO: 2.8 % — SIGNIFICANT CHANGE UP (ref 0–6)
EOSINOPHIL NFR BLD AUTO: 2.8 % — SIGNIFICANT CHANGE UP (ref 0–6)
HCT VFR BLD CALC: 45.9 % — SIGNIFICANT CHANGE UP (ref 39–50)
HCT VFR BLD CALC: 45.9 % — SIGNIFICANT CHANGE UP (ref 39–50)
HGB BLD-MCNC: 15.7 G/DL — SIGNIFICANT CHANGE UP (ref 13–17)
HGB BLD-MCNC: 15.7 G/DL — SIGNIFICANT CHANGE UP (ref 13–17)
IMM GRANULOCYTES NFR BLD AUTO: 0.7 % — SIGNIFICANT CHANGE UP (ref 0–0.9)
IMM GRANULOCYTES NFR BLD AUTO: 0.7 % — SIGNIFICANT CHANGE UP (ref 0–0.9)
LYMPHOCYTES # BLD AUTO: 1.99 K/UL — SIGNIFICANT CHANGE UP (ref 1–3.3)
LYMPHOCYTES # BLD AUTO: 1.99 K/UL — SIGNIFICANT CHANGE UP (ref 1–3.3)
LYMPHOCYTES # BLD AUTO: 23.9 % — SIGNIFICANT CHANGE UP (ref 13–44)
LYMPHOCYTES # BLD AUTO: 23.9 % — SIGNIFICANT CHANGE UP (ref 13–44)
MCHC RBC-ENTMCNC: 34.2 G/DL — SIGNIFICANT CHANGE UP (ref 32–36)
MCHC RBC-ENTMCNC: 34.2 G/DL — SIGNIFICANT CHANGE UP (ref 32–36)
MCHC RBC-ENTMCNC: 35.8 PG — HIGH (ref 27–34)
MCHC RBC-ENTMCNC: 35.8 PG — HIGH (ref 27–34)
MCV RBC AUTO: 104.8 FL — HIGH (ref 80–100)
MCV RBC AUTO: 104.8 FL — HIGH (ref 80–100)
MONOCYTES # BLD AUTO: 0.59 K/UL — SIGNIFICANT CHANGE UP (ref 0–0.9)
MONOCYTES # BLD AUTO: 0.59 K/UL — SIGNIFICANT CHANGE UP (ref 0–0.9)
MONOCYTES NFR BLD AUTO: 7.1 % — SIGNIFICANT CHANGE UP (ref 2–14)
MONOCYTES NFR BLD AUTO: 7.1 % — SIGNIFICANT CHANGE UP (ref 2–14)
NEUTROPHILS # BLD AUTO: 5.37 K/UL — SIGNIFICANT CHANGE UP (ref 1.8–7.4)
NEUTROPHILS # BLD AUTO: 5.37 K/UL — SIGNIFICANT CHANGE UP (ref 1.8–7.4)
NEUTROPHILS NFR BLD AUTO: 64.7 % — SIGNIFICANT CHANGE UP (ref 43–77)
NEUTROPHILS NFR BLD AUTO: 64.7 % — SIGNIFICANT CHANGE UP (ref 43–77)
NRBC # BLD: 0 /100 WBCS — SIGNIFICANT CHANGE UP (ref 0–0)
NRBC # BLD: 0 /100 WBCS — SIGNIFICANT CHANGE UP (ref 0–0)
PLATELET # BLD AUTO: 567 K/UL — HIGH (ref 150–400)
PLATELET # BLD AUTO: 567 K/UL — HIGH (ref 150–400)
RBC # BLD: 4.38 M/UL — SIGNIFICANT CHANGE UP (ref 4.2–5.8)
RBC # BLD: 4.38 M/UL — SIGNIFICANT CHANGE UP (ref 4.2–5.8)
RBC # FLD: 13.2 % — SIGNIFICANT CHANGE UP (ref 10.3–14.5)
RBC # FLD: 13.2 % — SIGNIFICANT CHANGE UP (ref 10.3–14.5)
WBC # BLD: 8.31 K/UL — SIGNIFICANT CHANGE UP (ref 3.8–10.5)
WBC # BLD: 8.31 K/UL — SIGNIFICANT CHANGE UP (ref 3.8–10.5)
WBC # FLD AUTO: 8.31 K/UL — SIGNIFICANT CHANGE UP (ref 3.8–10.5)
WBC # FLD AUTO: 8.31 K/UL — SIGNIFICANT CHANGE UP (ref 3.8–10.5)

## 2023-10-18 PROCEDURE — 99214 OFFICE O/P EST MOD 30 MIN: CPT

## 2023-10-19 LAB
ALBUMIN SERPL ELPH-MCNC: 4.4 G/DL
ALP BLD-CCNC: 79 U/L
ALT SERPL-CCNC: 38 U/L
ANION GAP SERPL CALC-SCNC: 12 MMOL/L
AST SERPL-CCNC: 23 U/L
BILIRUB SERPL-MCNC: 0.4 MG/DL
BUN SERPL-MCNC: 27 MG/DL
CALCIUM SERPL-MCNC: 9.7 MG/DL
CHLORIDE SERPL-SCNC: 103 MMOL/L
CO2 SERPL-SCNC: 26 MMOL/L
CREAT SERPL-MCNC: 0.93 MG/DL
EGFR: 87 ML/MIN/1.73M2
FOLATE SERPL-MCNC: 11.7 NG/ML
GLUCOSE SERPL-MCNC: 92 MG/DL
LDH SERPL-CCNC: 252 U/L
POTASSIUM SERPL-SCNC: 4.3 MMOL/L
PROT SERPL-MCNC: 6.8 G/DL
SODIUM SERPL-SCNC: 141 MMOL/L
URATE SERPL-MCNC: 4.6 MG/DL
VIT B12 SERPL-MCNC: 1463 PG/ML

## 2024-01-09 ENCOUNTER — OUTPATIENT (OUTPATIENT)
Dept: OUTPATIENT SERVICES | Facility: HOSPITAL | Age: 74
LOS: 1 days | Discharge: HOME | End: 2024-01-09

## 2024-01-09 DIAGNOSIS — D47.1 CHRONIC MYELOPROLIFERATIVE DISEASE: ICD-10-CM

## 2024-01-10 ENCOUNTER — RESULT REVIEW (OUTPATIENT)
Age: 74
End: 2024-01-10

## 2024-01-10 ENCOUNTER — APPOINTMENT (OUTPATIENT)
Dept: HEMATOLOGY ONCOLOGY | Facility: CLINIC | Age: 74
End: 2024-01-10
Payer: MEDICARE

## 2024-01-10 VITALS
BODY MASS INDEX: 26.73 KG/M2 | SYSTOLIC BLOOD PRESSURE: 129 MMHG | RESPIRATION RATE: 16 BRPM | HEART RATE: 65 BPM | DIASTOLIC BLOOD PRESSURE: 81 MMHG | TEMPERATURE: 97.6 F | WEIGHT: 181 LBS | OXYGEN SATURATION: 98 %

## 2024-01-10 LAB
ALBUMIN SERPL ELPH-MCNC: 4.4 G/DL
ALP BLD-CCNC: 77 U/L
ALT SERPL-CCNC: 42 U/L
ANION GAP SERPL CALC-SCNC: 9 MMOL/L
AST SERPL-CCNC: 22 U/L
BASOPHILS # BLD AUTO: 0.06 K/UL — SIGNIFICANT CHANGE UP (ref 0–0.2)
BASOPHILS # BLD AUTO: 0.06 K/UL — SIGNIFICANT CHANGE UP (ref 0–0.2)
BASOPHILS NFR BLD AUTO: 0.9 % — SIGNIFICANT CHANGE UP (ref 0–2)
BASOPHILS NFR BLD AUTO: 0.9 % — SIGNIFICANT CHANGE UP (ref 0–2)
BILIRUB SERPL-MCNC: 0.4 MG/DL
BUN SERPL-MCNC: 27 MG/DL
CALCIUM SERPL-MCNC: 9.7 MG/DL
CHLORIDE SERPL-SCNC: 104 MMOL/L
CO2 SERPL-SCNC: 28 MMOL/L
CREAT SERPL-MCNC: 0.97 MG/DL
EGFR: 83 ML/MIN/1.73M2
EOSINOPHIL # BLD AUTO: 0.12 K/UL — SIGNIFICANT CHANGE UP (ref 0–0.5)
EOSINOPHIL # BLD AUTO: 0.12 K/UL — SIGNIFICANT CHANGE UP (ref 0–0.5)
EOSINOPHIL NFR BLD AUTO: 1.9 % — SIGNIFICANT CHANGE UP (ref 0–6)
EOSINOPHIL NFR BLD AUTO: 1.9 % — SIGNIFICANT CHANGE UP (ref 0–6)
GLUCOSE SERPL-MCNC: 73 MG/DL
HCT VFR BLD CALC: 45 % — SIGNIFICANT CHANGE UP (ref 39–50)
HCT VFR BLD CALC: 45 % — SIGNIFICANT CHANGE UP (ref 39–50)
HGB BLD-MCNC: 15.4 G/DL — SIGNIFICANT CHANGE UP (ref 13–17)
HGB BLD-MCNC: 15.4 G/DL — SIGNIFICANT CHANGE UP (ref 13–17)
IMM GRANULOCYTES NFR BLD AUTO: 0.3 % — SIGNIFICANT CHANGE UP (ref 0–0.9)
IMM GRANULOCYTES NFR BLD AUTO: 0.3 % — SIGNIFICANT CHANGE UP (ref 0–0.9)
LDH SERPL-CCNC: 240 U/L
LYMPHOCYTES # BLD AUTO: 1.46 K/UL — SIGNIFICANT CHANGE UP (ref 1–3.3)
LYMPHOCYTES # BLD AUTO: 1.46 K/UL — SIGNIFICANT CHANGE UP (ref 1–3.3)
LYMPHOCYTES # BLD AUTO: 22.9 % — SIGNIFICANT CHANGE UP (ref 13–44)
LYMPHOCYTES # BLD AUTO: 22.9 % — SIGNIFICANT CHANGE UP (ref 13–44)
MCHC RBC-ENTMCNC: 34.2 G/DL — SIGNIFICANT CHANGE UP (ref 32–36)
MCHC RBC-ENTMCNC: 34.2 G/DL — SIGNIFICANT CHANGE UP (ref 32–36)
MCHC RBC-ENTMCNC: 36.1 PG — HIGH (ref 27–34)
MCHC RBC-ENTMCNC: 36.1 PG — HIGH (ref 27–34)
MCV RBC AUTO: 105.4 FL — HIGH (ref 80–100)
MCV RBC AUTO: 105.4 FL — HIGH (ref 80–100)
MONOCYTES # BLD AUTO: 0.42 K/UL — SIGNIFICANT CHANGE UP (ref 0–0.9)
MONOCYTES # BLD AUTO: 0.42 K/UL — SIGNIFICANT CHANGE UP (ref 0–0.9)
MONOCYTES NFR BLD AUTO: 6.6 % — SIGNIFICANT CHANGE UP (ref 2–14)
MONOCYTES NFR BLD AUTO: 6.6 % — SIGNIFICANT CHANGE UP (ref 2–14)
NEUTROPHILS # BLD AUTO: 4.29 K/UL — SIGNIFICANT CHANGE UP (ref 1.8–7.4)
NEUTROPHILS # BLD AUTO: 4.29 K/UL — SIGNIFICANT CHANGE UP (ref 1.8–7.4)
NEUTROPHILS NFR BLD AUTO: 67.4 % — SIGNIFICANT CHANGE UP (ref 43–77)
NEUTROPHILS NFR BLD AUTO: 67.4 % — SIGNIFICANT CHANGE UP (ref 43–77)
NRBC # BLD: 0 /100 WBCS — SIGNIFICANT CHANGE UP (ref 0–0)
NRBC # BLD: 0 /100 WBCS — SIGNIFICANT CHANGE UP (ref 0–0)
PLATELET # BLD AUTO: 493 K/UL — HIGH (ref 150–400)
PLATELET # BLD AUTO: 493 K/UL — HIGH (ref 150–400)
POTASSIUM SERPL-SCNC: 4.3 MMOL/L
PROT SERPL-MCNC: 6.8 G/DL
RBC # BLD: 4.27 M/UL — SIGNIFICANT CHANGE UP (ref 4.2–5.8)
RBC # BLD: 4.27 M/UL — SIGNIFICANT CHANGE UP (ref 4.2–5.8)
RBC # FLD: 13.2 % — SIGNIFICANT CHANGE UP (ref 10.3–14.5)
RBC # FLD: 13.2 % — SIGNIFICANT CHANGE UP (ref 10.3–14.5)
SODIUM SERPL-SCNC: 141 MMOL/L
URATE SERPL-MCNC: 5.6 MG/DL
WBC # BLD: 6.37 K/UL — SIGNIFICANT CHANGE UP (ref 3.8–10.5)
WBC # BLD: 6.37 K/UL — SIGNIFICANT CHANGE UP (ref 3.8–10.5)
WBC # FLD AUTO: 6.37 K/UL — SIGNIFICANT CHANGE UP (ref 3.8–10.5)
WBC # FLD AUTO: 6.37 K/UL — SIGNIFICANT CHANGE UP (ref 3.8–10.5)

## 2024-01-10 PROCEDURE — 99213 OFFICE O/P EST LOW 20 MIN: CPT

## 2024-01-10 RX ORDER — HYDROXYUREA 500 MG/1
500 CAPSULE ORAL DAILY
Qty: 60 | Refills: 4 | Status: ACTIVE | COMMUNITY
Start: 2020-08-06 | End: 1900-01-01

## 2024-01-10 NOTE — PHYSICAL EXAM
[Fully active, able to carry on all pre-disease performance without restriction] : Status 0 - Fully active, able to carry on all pre-disease performance without restriction [Normal] : normal appearance, no rash, nodules, vesicles, ulcers, erythema [de-identified] : No cervical, axillary or inguinal LAD noted

## 2024-01-10 NOTE — ASSESSMENT
[FreeTextEntry1] : 73-year-old man with CAD s/p stents X 3, LV thrombus resolved, ischemic cardiomyopathy, aortic stenosis, hypertension, and hyperlipidemia here for continued evaluation and treatment of JAK2+ essential thrombocytosis.   He is currently on Hydrea 500mg 2 tablet alternating with 1 tablet daily for the past 1.5 years.  He is tolerating the current Hydrea dose well without any ill side effects. In EMR, platelets have been running between 500-600,000.  Hemoglobin has been running 15-16g/dL. Risks and benefits of increasing dose discussed with patient at length including the risk of further thrombotic events with an elevated platelet count.   [ ] Essential thrombocytosis, JAK2+ - Goal PLT count 400,000 and Goal HGB> or = to 12.5 - Platelet count with improvement noted 495k  - Continue Jakafi 500mg BID - Pending CMP, LDH, uric acid today  - Pt did not obtain abd US yet - recommend again to obtain baseline abd US to assess spleen size  RTC in 3 months Case and management discussed with Dr. Yan

## 2024-01-10 NOTE — HISTORY OF PRESENT ILLNESS
[de-identified] : Initial Consultation on 10/18/2023 Referred by: Dr. Argenis Cervantes- retired earlier this year. Accompanied by: Son- in-law Srinath -translating for patient (Patient speaks Hebrew) CC: JAK2+ ET further evaluation and treatment   HPI: 73-year-old man with CAD s/p stents X 3, LV thrombus resolved, ischemic cardiomyopathy, aortic stenosis, hypertension, and hyperlipidemia here for continued evaluation and treatment of essential thrombocytosis.  In June 2020, patient was noted to have acute MI c/b left ventricular thrombus.  He underwent cardiac stents X 3.  He was treated with Aspirin and Plavix for the stents and warfarin for the LV thrombus.  He completed a course of warfarin (1.5-2 years of Warfarin).  Recent echocardiogram on 10/16/2023 showed no evidence of thrombus. He was noted at the time of acute MI/LV thrombus to have an elevated platelet count.  He was evaluated by Hematology in 8/2020 by Dr. Argenis Cervantes.  He was noted to have platelets as high as 885,000. Blood work in 7/2020 showed JAK2+ in 14.9% of cells.  He was started on Hydrea 500mg.  He is currently on Hydrea 500mg 2 tablet alternating with 1 tablet daily for the past 1.5 years.  He is tolerating the current Hydrea dose well without any ill side effects. In EMR, platelets have been running between 500-600,000.  Hemoglobin has been running 15-16g/dL.  Patient denies any additional thrombotic events.   He feels good overall.  He reports a good energy level and appetite without any recent weight loss.  He walks 7-10 miles per day. Patient denies any fever/chills, recent infections, CP, palpitations, SOB, abdominal pain, n/v/d, bloody/black stools, frequent headaches, dizziness, change in vision, neuropathy, erythromelalgia, skin changes/mouth ulcers, new skeletal pain, swollen glands or any night sweats.   Hospitalizations: 6/2020 at [facility] for MI, CAD, LV thrombus  Medications: Patient does not recall list of medications.  Also takes B12, Vitamin D3 and Vitamin C  Allergies: NKDA  Social History: . Has two children.  Retired . Denies smoking or alcohol use.  Born in [place].   Family History: Denies pertinent family history.   Healthcare Maintenance: Primary care doctor- Dr. Ramila Feldman- last seen>1 year ago.  Last colonoscopy- 5 years ago, normal Denies endoscopy. Cardiology- Dr. Jose Antonio TRACY- Dr. Smyth- last seen for infection in 8/2021 Recent stress test was normal  [de-identified] : 10/18/24: Initial Visit   1/10/24 Patient seen in follow up for JAK2+ ET. Accompanied by his son in law. Interim, he's been in good health. He has no health concerns. He is currently taking Hydrea 500mg BID. Today his platelet count is 493k, which is an improvement from last time platelet count of 567k during previous visit in October. Denies fatigue, headaches, dizziness, lightheadedness, visual disturbances, SOB, chest pain, abdominal pain, early satiety, paresthesia or peripheral edema

## 2024-01-10 NOTE — RESULTS/DATA
[FreeTextEntry1] : 1/10/24 Platelet count today 493k 493 < 567 < 542 < 325 < 459 < ... 947 (8/14/20) LDH from previous visit 252, pending repeat today

## 2024-01-10 NOTE — REASON FOR VISIT
[Initial Consultation] : an initial consultation for [Other: _____] : [unfilled] [FreeTextEntry2] : JAK2+ ET

## 2024-01-17 ENCOUNTER — APPOINTMENT (OUTPATIENT)
Dept: HEMATOLOGY ONCOLOGY | Facility: CLINIC | Age: 74
End: 2024-01-17

## 2024-01-17 ENCOUNTER — LABORATORY RESULT (OUTPATIENT)
Age: 74
End: 2024-01-17

## 2024-01-17 ENCOUNTER — APPOINTMENT (OUTPATIENT)
Dept: HEART AND VASCULAR | Facility: CLINIC | Age: 74
End: 2024-01-17
Payer: MEDICARE

## 2024-01-17 VITALS
BODY MASS INDEX: 26.81 KG/M2 | HEIGHT: 69 IN | HEART RATE: 64 BPM | WEIGHT: 181 LBS | DIASTOLIC BLOOD PRESSURE: 82 MMHG | SYSTOLIC BLOOD PRESSURE: 140 MMHG | TEMPERATURE: 98.1 F | OXYGEN SATURATION: 97 %

## 2024-01-17 DIAGNOSIS — I10 ESSENTIAL (PRIMARY) HYPERTENSION: ICD-10-CM

## 2024-01-17 DIAGNOSIS — I35.0 NONRHEUMATIC AORTIC (VALVE) STENOSIS: ICD-10-CM

## 2024-01-17 DIAGNOSIS — Z00.00 ENCOUNTER FOR GENERAL ADULT MEDICAL EXAMINATION W/OUT ABNORMAL FINDINGS: ICD-10-CM

## 2024-01-17 DIAGNOSIS — I25.10 ATHEROSCLEROTIC HEART DISEASE OF NATIVE CORONARY ARTERY W/OUT ANGINA PECTORIS: ICD-10-CM

## 2024-01-17 PROCEDURE — 99214 OFFICE O/P EST MOD 30 MIN: CPT | Mod: 25

## 2024-01-17 PROCEDURE — G2211 COMPLEX E/M VISIT ADD ON: CPT

## 2024-01-17 PROCEDURE — 36415 COLL VENOUS BLD VENIPUNCTURE: CPT

## 2024-01-17 RX ORDER — LISINOPRIL 5 MG/1
5 TABLET ORAL
Qty: 90 | Refills: 3 | Status: ACTIVE | COMMUNITY
Start: 2021-06-22 | End: 1900-01-01

## 2024-01-17 NOTE — PHYSICAL EXAM
[Well Developed] : well developed [Well Nourished] : well nourished [No Acute Distress] : no acute distress [Normal Conjunctiva] : normal conjunctiva [Normal Venous Pressure] : normal venous pressure [No Carotid Bruit] : no carotid bruit [Normal S1, S2] : normal S1, S2 [No Rub] : no rub [No Gallop] : no gallop [Murmur] : murmur [Clear Lung Fields] : clear lung fields [Good Air Entry] : good air entry [No Respiratory Distress] : no respiratory distress  [Soft] : abdomen soft [Non Tender] : non-tender [No Masses/organomegaly] : no masses/organomegaly [Normal Bowel Sounds] : normal bowel sounds [Normal Gait] : normal gait [No Edema] : no edema [No Cyanosis] : no cyanosis [No Clubbing] : no clubbing [No Rash] : no rash [Moves all extremities] : moves all extremities [No Focal Deficits] : no focal deficits [Normal Speech] : normal speech [Alert and Oriented] : alert and oriented [Normal memory] : normal memory [de-identified] : 2/6

## 2024-01-17 NOTE — DISCUSSION/SUMMARY
[FreeTextEntry1] : stable exam, labs in office CAD stable, no angina, secondary prevention AS stable, asymptomatic, mild HTN stable on meds

## 2024-01-17 NOTE — REASON FOR VISIT
[Structural Heart and Valve Disease] : structural heart and valve disease [Hypertension] : hypertension [Coronary Artery Disease] : coronary artery disease [Family Member] : family member

## 2024-01-18 LAB
ALBUMIN SERPL ELPH-MCNC: 4.6 G/DL
ALP BLD-CCNC: 82 U/L
ALT SERPL-CCNC: 49 U/L
ANION GAP SERPL CALC-SCNC: 11 MMOL/L
APPEARANCE: CLEAR
AST SERPL-CCNC: 27 U/L
BASOPHILS # BLD AUTO: 0.05 K/UL
BASOPHILS NFR BLD AUTO: 0.7 %
BILIRUB SERPL-MCNC: 0.4 MG/DL
BILIRUBIN URINE: NEGATIVE
BLOOD URINE: ABNORMAL
BUN SERPL-MCNC: 23 MG/DL
CALCIUM SERPL-MCNC: 9.7 MG/DL
CHLORIDE SERPL-SCNC: 103 MMOL/L
CHOLEST SERPL-MCNC: 109 MG/DL
CO2 SERPL-SCNC: 26 MMOL/L
COLOR: YELLOW
CREAT SERPL-MCNC: 0.95 MG/DL
CREAT SPEC-SCNC: 95 MG/DL
EGFR: 85 ML/MIN/1.73M2
EOSINOPHIL # BLD AUTO: 0.17 K/UL
EOSINOPHIL NFR BLD AUTO: 2.4 %
ESTIMATED AVERAGE GLUCOSE: 114 MG/DL
GLUCOSE QUALITATIVE U: NEGATIVE MG/DL
GLUCOSE SERPL-MCNC: 88 MG/DL
HBA1C MFR BLD HPLC: 5.6 %
HCT VFR BLD CALC: 47.3 %
HDLC SERPL-MCNC: 34 MG/DL
HGB BLD-MCNC: 15.9 G/DL
IMM GRANULOCYTES NFR BLD AUTO: 0.3 %
KETONES URINE: NEGATIVE MG/DL
LDLC SERPL CALC-MCNC: 56 MG/DL
LEUKOCYTE ESTERASE URINE: ABNORMAL
LYMPHOCYTES # BLD AUTO: 1.8 K/UL
LYMPHOCYTES NFR BLD AUTO: 25.4 %
MAN DIFF?: NORMAL
MCHC RBC-ENTMCNC: 33.6 GM/DL
MCHC RBC-ENTMCNC: 37.3 PG
MCV RBC AUTO: 111 FL
MICROALBUMIN 24H UR DL<=1MG/L-MCNC: <1.2 MG/DL
MICROALBUMIN/CREAT 24H UR-RTO: NORMAL MG/G
MONOCYTES # BLD AUTO: 0.56 K/UL
MONOCYTES NFR BLD AUTO: 7.9 %
NEUTROPHILS # BLD AUTO: 4.49 K/UL
NEUTROPHILS NFR BLD AUTO: 63.3 %
NITRITE URINE: POSITIVE
NONHDLC SERPL-MCNC: 74 MG/DL
PH URINE: 5.5
PLATELET # BLD AUTO: 517 K/UL
POTASSIUM SERPL-SCNC: 5 MMOL/L
PROT SERPL-MCNC: 7.1 G/DL
PROTEIN URINE: NEGATIVE MG/DL
PSA SERPL-MCNC: 3.68 NG/ML
RBC # BLD: 4.26 M/UL
RBC # FLD: 13.8 %
SODIUM SERPL-SCNC: 139 MMOL/L
SPECIFIC GRAVITY URINE: 1.02
TRIGL SERPL-MCNC: 94 MG/DL
TSH SERPL-ACNC: 1.11 UIU/ML
UROBILINOGEN URINE: 0.2 MG/DL
WBC # FLD AUTO: 7.09 K/UL

## 2024-01-19 ENCOUNTER — TRANSCRIPTION ENCOUNTER (OUTPATIENT)
Age: 74
End: 2024-01-19
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
## 2024-04-19 ENCOUNTER — OUTPATIENT (OUTPATIENT)
Dept: OUTPATIENT SERVICES | Facility: HOSPITAL | Age: 74
LOS: 1 days | Discharge: HOME | End: 2024-04-19

## 2024-04-19 DIAGNOSIS — D47.1 CHRONIC MYELOPROLIFERATIVE DISEASE: ICD-10-CM

## 2024-04-28 ENCOUNTER — NON-APPOINTMENT (OUTPATIENT)
Age: 74
End: 2024-04-28

## 2024-04-29 ENCOUNTER — RESULT REVIEW (OUTPATIENT)
Age: 74
End: 2024-04-29

## 2024-04-29 ENCOUNTER — APPOINTMENT (OUTPATIENT)
Dept: HEMATOLOGY ONCOLOGY | Facility: CLINIC | Age: 74
End: 2024-04-29

## 2024-04-29 ENCOUNTER — APPOINTMENT (OUTPATIENT)
Dept: HEMATOLOGY ONCOLOGY | Facility: CLINIC | Age: 74
End: 2024-04-29
Payer: MEDICARE

## 2024-04-29 VITALS
DIASTOLIC BLOOD PRESSURE: 84 MMHG | SYSTOLIC BLOOD PRESSURE: 145 MMHG | OXYGEN SATURATION: 98 % | HEART RATE: 74 BPM | RESPIRATION RATE: 16 BRPM | BODY MASS INDEX: 27.44 KG/M2 | HEIGHT: 69 IN | TEMPERATURE: 98.2 F | WEIGHT: 185.25 LBS

## 2024-04-29 DIAGNOSIS — D47.3 ESSENTIAL (HEMORRHAGIC) THROMBOCYTHEMIA: ICD-10-CM

## 2024-04-29 DIAGNOSIS — Z15.89 GENETIC SUSCEPTIBILITY TO OTHER DISEASE: ICD-10-CM

## 2024-04-29 LAB
BASOPHILS # BLD AUTO: 0.05 K/UL — SIGNIFICANT CHANGE UP (ref 0–0.2)
BASOPHILS NFR BLD AUTO: 0.8 % — SIGNIFICANT CHANGE UP (ref 0–2)
EOSINOPHIL # BLD AUTO: 0.1 K/UL — SIGNIFICANT CHANGE UP (ref 0–0.5)
EOSINOPHIL NFR BLD AUTO: 1.6 % — SIGNIFICANT CHANGE UP (ref 0–6)
HCT VFR BLD CALC: 40.8 % — SIGNIFICANT CHANGE UP (ref 39–50)
HGB BLD-MCNC: 14.1 G/DL — SIGNIFICANT CHANGE UP (ref 13–17)
IMM GRANULOCYTES NFR BLD AUTO: 0.3 % — SIGNIFICANT CHANGE UP (ref 0–0.9)
LYMPHOCYTES # BLD AUTO: 1.6 K/UL — SIGNIFICANT CHANGE UP (ref 1–3.3)
LYMPHOCYTES # BLD AUTO: 25.4 % — SIGNIFICANT CHANGE UP (ref 13–44)
MCHC RBC-ENTMCNC: 34.6 G/DL — SIGNIFICANT CHANGE UP (ref 32–36)
MCHC RBC-ENTMCNC: 37.3 PG — HIGH (ref 27–34)
MCV RBC AUTO: 107.9 FL — HIGH (ref 80–100)
MONOCYTES # BLD AUTO: 0.45 K/UL — SIGNIFICANT CHANGE UP (ref 0–0.9)
MONOCYTES NFR BLD AUTO: 7.1 % — SIGNIFICANT CHANGE UP (ref 2–14)
NEUTROPHILS # BLD AUTO: 4.09 K/UL — SIGNIFICANT CHANGE UP (ref 1.8–7.4)
NEUTROPHILS NFR BLD AUTO: 64.8 % — SIGNIFICANT CHANGE UP (ref 43–77)
NRBC # BLD: 0 /100 WBCS — SIGNIFICANT CHANGE UP (ref 0–0)
PLATELET # BLD AUTO: 381 K/UL — SIGNIFICANT CHANGE UP (ref 150–400)
RBC # BLD: 3.78 M/UL — LOW (ref 4.2–5.8)
RBC # FLD: 13.7 % — SIGNIFICANT CHANGE UP (ref 10.3–14.5)
WBC # BLD: 6.31 K/UL — SIGNIFICANT CHANGE UP (ref 3.8–10.5)
WBC # FLD AUTO: 6.31 K/UL — SIGNIFICANT CHANGE UP (ref 3.8–10.5)

## 2024-04-29 PROCEDURE — 99214 OFFICE O/P EST MOD 30 MIN: CPT

## 2024-04-29 NOTE — PHYSICAL EXAM
[Fully active, able to carry on all pre-disease performance without restriction] : Status 0 - Fully active, able to carry on all pre-disease performance without restriction [Normal] : normal appearance, no rash, nodules, vesicles, ulcers, erythema [de-identified] : No cervical, axillary or inguinal LAD noted

## 2024-04-29 NOTE — HISTORY OF PRESENT ILLNESS
[de-identified] : Initial Consultation on 10/18/2023 Referred by: Dr. Argenis Cervantes- retired earlier this year. Accompanied by: Son- in-law Srinath -translating for patient (Patient speaks Serbian) CC: JAK2+ ET further evaluation and treatment   HPI: 73-year-old man with CAD s/p stents X 3, LV thrombus resolved, ischemic cardiomyopathy, aortic stenosis, hypertension, and hyperlipidemia here for continued evaluation and treatment of essential thrombocytosis.  In June 2020, patient was noted to have acute MI c/b left ventricular thrombus.  He underwent cardiac stents X 3.  He was treated with Aspirin and Plavix for the stents and warfarin for the LV thrombus.  He completed a course of warfarin (1.5-2 years of Warfarin).  Recent echocardiogram on 10/16/2023 showed no evidence of thrombus. He was noted at the time of acute MI/LV thrombus to have an elevated platelet count.  He was evaluated by Hematology in 8/2020 by Dr. Argenis Cervantes.  He was noted to have platelets as high as 885,000. Blood work in 7/2020 showed JAK2+ in 14.9% of cells.  He was started on Hydrea 500mg.  He is currently on Hydrea 500mg 2 tablet alternating with 1 tablet daily for the past 1.5 years.  He is tolerating the current Hydrea dose well without any ill side effects. In EMR, platelets have been running between 500-600,000.  Hemoglobin has been running 15-16g/dL.  Patient denies any additional thrombotic events.   He feels good overall.  He reports a good energy level and appetite without any recent weight loss.  He walks 7-10 miles per day. Patient denies any fever/chills, recent infections, CP, palpitations, SOB, abdominal pain, n/v/d, bloody/black stools, frequent headaches, dizziness, change in vision, neuropathy, erythromelalgia, skin changes/mouth ulcers, new skeletal pain, swollen glands or any night sweats.   Hospitalizations: 6/2020 at [facility] for MI, CAD, LV thrombus  Medications: Patient does not recall list of medications.  Also takes B12, Vitamin D3 and Vitamin C  Allergies: NKDA  Social History: . Has two children.  Retired . Denies smoking or alcohol use.  Born in [place].   Family History: Denies pertinent family history.   Healthcare Maintenance: Primary care doctor- Dr. Ramila Feldman- last seen>1 year ago.  Last colonoscopy- 5 years ago, normal Denies endoscopy. Cardiology- Dr. Jose Antonio TRACY- Dr. Smyth- last seen for infection in 8/2021 Recent stress test was normal  [de-identified] : She is alert and oriented vital signs are stable.  Examination left wrist hand and thumb revealed no swelling.  She no tenderness on the first dorsal compartment.  She had no tenderness around the scaphoid and the anatomical snuffbox.  No tenderness volarly.  She had minimal tenderness on the left thumb carpometacarpal joint with a negative carpometacarpal joint grind.  The FPL and EPL are intact.  She was neurovascular intact.  She had negative Finkelstein's test.  She had full range of motion of the wrist.  She has the most tenderness around the scaphotrapezial -trapezoid joint (STT). She had full range of motion of the elbow.  Normal neurologic exam, normal vascular exam normal skin exam.  There is no evidence of open wounds infection or compartment syndrome. [de-identified] : 10/18/24: Initial Visit   1/10/24 Patient seen in follow up for JAK2+ ET. Accompanied by his son in law. Interim, he's been in good health. He has no health concerns. He is currently taking Hydrea 500mg BID. Today his platelet count is 493k, which is an improvement from last time platelet count of 567k during previous visit in October. Denies fatigue, headaches, dizziness, lightheadedness, visual disturbances, SOB, chest pain, abdominal pain, early satiety, paresthesia or peripheral edema.  4/29/2024 Visit conducted via [employer] [facility]. Patient returns for a follow up. He has done well in the interim. His platelet count remains ~500K. He is on Hydrea 500 mg x2 pills (1000 mg) once a day. His HGB and WBC are WN. He tolerates well. He has no h/o thrombosis or abnormal bleeding.  He is in good health and offers no complaints.     - [Left] : left wrist [FreeTextEntry8] : X-rays left wrist 3 views revealed no fractures or dislocations.  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

## 2024-04-29 NOTE — ASSESSMENT
[FreeTextEntry1] : 73-year-old man with CAD s/p stents X 3, LV thrombus resolved, ischemic cardiomyopathy, aortic stenosis, hypertension, and hyperlipidemia here for continued evaluation and treatment of JAK2+ essential thrombocytosis.   He is currently on Hydrea 500mg 2 tablet alternating with 1 tablet daily for the past 1.5 years.  He is tolerating the current Hydrea dose well without any ill side effects. In EMR, platelets have been running between 500-600,000.  Hemoglobin has been running 15-16g/dL. Risks and benefits of increasing dose discussed with patient at length including the risk of further thrombotic events with an elevated platelet count.   [] Essential thrombocytosis, JAK2+ - Goal PLT count 400,000 and Goal HGB> or = to 12.5 - Platelet count with improvement noted 495k  - Continue Hydrea 500mg BID - Pending CMP, LDH, uric acid today  - Pt did not obtain abd US yet - recommend again to obtain baseline abd US to assess spleen size  RTC in 3 months

## 2024-04-29 NOTE — RESULTS/DATA
[FreeTextEntry1] : 4/29/2024  JAK2 14.9% (7/24/2020)  Today's CBC  Normal WBC and HGB  LDH pending   1/10/24 Platelet count today 493k 493 < 567 < 542 < 325 < 459 < ... 947 (8/14/20) LDH from previous visit 252, pending repeat today  *

## 2024-05-01 LAB
ALBUMIN SERPL ELPH-MCNC: 4.1 G/DL
ALP BLD-CCNC: 63 U/L
ALT SERPL-CCNC: 34 U/L
ANION GAP SERPL CALC-SCNC: 11 MMOL/L
AST SERPL-CCNC: 20 U/L
BILIRUB SERPL-MCNC: 0.4 MG/DL
BUN SERPL-MCNC: 24 MG/DL
CALCIUM SERPL-MCNC: 9.1 MG/DL
CHLORIDE SERPL-SCNC: 111 MMOL/L
CO2 SERPL-SCNC: 21 MMOL/L
CREAT SERPL-MCNC: 0.91 MG/DL
EGFR: 90 ML/MIN/1.73M2
GLUCOSE SERPL-MCNC: 91 MG/DL
LDH SERPL-CCNC: 230 U/L
POTASSIUM SERPL-SCNC: 4 MMOL/L
PROT SERPL-MCNC: 6.2 G/DL
SODIUM SERPL-SCNC: 143 MMOL/L

## 2024-07-24 ENCOUNTER — RX RENEWAL (OUTPATIENT)
Age: 74
End: 2024-07-24

## 2024-08-01 ENCOUNTER — OUTPATIENT (OUTPATIENT)
Dept: OUTPATIENT SERVICES | Facility: HOSPITAL | Age: 74
LOS: 1 days | Discharge: HOME | End: 2024-08-01

## 2024-08-01 DIAGNOSIS — D47.1 CHRONIC MYELOPROLIFERATIVE DISEASE: ICD-10-CM

## 2024-08-05 ENCOUNTER — RESULT REVIEW (OUTPATIENT)
Age: 74
End: 2024-08-05

## 2024-08-05 ENCOUNTER — APPOINTMENT (OUTPATIENT)
Dept: HEMATOLOGY ONCOLOGY | Facility: CLINIC | Age: 74
End: 2024-08-05

## 2024-08-05 LAB
BASOPHILS # BLD AUTO: 0.04 K/UL — SIGNIFICANT CHANGE UP (ref 0–0.2)
BASOPHILS NFR BLD AUTO: 0.7 % — SIGNIFICANT CHANGE UP (ref 0–2)
EOSINOPHIL # BLD AUTO: 0.07 K/UL — SIGNIFICANT CHANGE UP (ref 0–0.5)
EOSINOPHIL NFR BLD AUTO: 1.2 % — SIGNIFICANT CHANGE UP (ref 0–6)
HCT VFR BLD CALC: 45.1 % — SIGNIFICANT CHANGE UP (ref 39–50)
HGB BLD-MCNC: 15.4 G/DL — SIGNIFICANT CHANGE UP (ref 13–17)
IMM GRANULOCYTES NFR BLD AUTO: 1.7 % — HIGH (ref 0–0.9)
LYMPHOCYTES # BLD AUTO: 1.15 K/UL — SIGNIFICANT CHANGE UP (ref 1–3.3)
LYMPHOCYTES # BLD AUTO: 19.5 % — SIGNIFICANT CHANGE UP (ref 13–44)
MCHC RBC-ENTMCNC: 34.1 G/DL — SIGNIFICANT CHANGE UP (ref 32–36)
MCHC RBC-ENTMCNC: 37.4 PG — HIGH (ref 27–34)
MCV RBC AUTO: 109.5 FL — HIGH (ref 80–100)
MONOCYTES # BLD AUTO: 0.4 K/UL — SIGNIFICANT CHANGE UP (ref 0–0.9)
MONOCYTES NFR BLD AUTO: 6.8 % — SIGNIFICANT CHANGE UP (ref 2–14)
NEUTROPHILS # BLD AUTO: 4.13 K/UL — SIGNIFICANT CHANGE UP (ref 1.8–7.4)
NEUTROPHILS NFR BLD AUTO: 70.1 % — SIGNIFICANT CHANGE UP (ref 43–77)
NRBC # BLD: 0 /100 WBCS — SIGNIFICANT CHANGE UP (ref 0–0)
NRBC BLD-RTO: 0 /100 WBCS — SIGNIFICANT CHANGE UP (ref 0–0)
PLATELET # BLD AUTO: 383 K/UL — SIGNIFICANT CHANGE UP (ref 150–400)
RBC # BLD: 4.12 M/UL — LOW (ref 4.2–5.8)
RBC # FLD: 12.6 % — SIGNIFICANT CHANGE UP (ref 10.3–14.5)
WBC # BLD: 5.89 K/UL — SIGNIFICANT CHANGE UP (ref 3.8–10.5)
WBC # FLD AUTO: 5.89 K/UL — SIGNIFICANT CHANGE UP (ref 3.8–10.5)

## 2024-08-05 PROCEDURE — 99214 OFFICE O/P EST MOD 30 MIN: CPT

## 2024-08-05 NOTE — PHYSICAL EXAM
[Fully active, able to carry on all pre-disease performance without restriction] : Status 0 - Fully active, able to carry on all pre-disease performance without restriction [Normal] : normal appearance, no rash, nodules, vesicles, ulcers, erythema [de-identified] : No cervical, axillary or inguinal LAD noted

## 2024-08-05 NOTE — RESULTS/DATA
[FreeTextEntry1] : 8/5/2024 JAK2 positive ET GHB 15.4 WBC 5.89  K  LDH pending    4/29/2024  JAK2 14.9% (7/24/2020)  Today's CBC  Normal WBC and HGB  LDH pending   1/10/24 Platelet count today 493k 493 < 567 < 542 < 325 < 459 < ... 947 (8/14/20) LDH from previous visit 252, pending repeat today  *

## 2024-08-05 NOTE — HISTORY OF PRESENT ILLNESS
[de-identified] : Initial Consultation on 10/18/2023 Referred by: Dr. Argenis Cervantes- retired earlier this year. Accompanied by: Son- in-law Srinath -translating for patient (Patient speaks Turkmen) CC: JAK2+ ET further evaluation and treatment   HPI: 73-year-old man with CAD s/p stents X 3, LV thrombus resolved, ischemic cardiomyopathy, aortic stenosis, hypertension, and hyperlipidemia here for continued evaluation and treatment of essential thrombocytosis.  In June 2020, patient was noted to have acute MI c/b left ventricular thrombus.  He underwent cardiac stents X 3.  He was treated with Aspirin and Plavix for the stents and warfarin for the LV thrombus.  He completed a course of warfarin (1.5-2 years of Warfarin).  Recent echocardiogram on 10/16/2023 showed no evidence of thrombus. He was noted at the time of acute MI/LV thrombus to have an elevated platelet count.  He was evaluated by Hematology in 8/2020 by Dr. Argenis Cervantes.  He was noted to have platelets as high as 885,000. Blood work in 7/2020 showed JAK2+ in 14.9% of cells.  He was started on Hydrea 500mg.  He is currently on Hydrea 500mg 2 tablet alternating with 1 tablet daily for the past 1.5 years.  He is tolerating the current Hydrea dose well without any ill side effects. In EMR, platelets have been running between 500-600,000.  Hemoglobin has been running 15-16g/dL.  Patient denies any additional thrombotic events.   He feels good overall.  He reports a good energy level and appetite without any recent weight loss.  He walks 7-10 miles per day. Patient denies any fever/chills, recent infections, CP, palpitations, SOB, abdominal pain, n/v/d, bloody/black stools, frequent headaches, dizziness, change in vision, neuropathy, erythromelalgia, skin changes/mouth ulcers, new skeletal pain, swollen glands or any night sweats.   Hospitalizations: 6/2020 at [facility] for MI, CAD, LV thrombus  Medications: Patient does not recall list of medications.  Also takes B12, Vitamin D3 and Vitamin C  Allergies: NKDA  Social History: . Has two children.  Retired . Denies smoking or alcohol use.  Born in [place].   Family History: Denies pertinent family history.   Healthcare Maintenance: Primary care doctor- Dr. Ramila Feldman- last seen>1 year ago.  Last colonoscopy- 5 years ago, normal Denies endoscopy. Cardiology- Dr. Jose Antonio TRACY- Dr. Smyth- last seen for infection in 8/2021 Recent stress test was normal  [de-identified] : 10/18/24: Initial Visit   1/10/24 Patient seen in follow up for JAK2+ ET. Accompanied by his son in law. Interim, he's been in good health. He has no health concerns. He is currently taking Hydrea 500mg BID. Today his platelet count is 493k, which is an improvement from last time platelet count of 567k during previous visit in October. Denies fatigue, headaches, dizziness, lightheadedness, visual disturbances, SOB, chest pain, abdominal pain, early satiety, paresthesia or peripheral edema.  4/29/2024 Visit conducted via [employer] [facility]. Patient returns for a follow up. He has done well in the interim. His platelet count remains ~500K. He is on Hydrea 500 mg x2 pills (1000 mg) once a day. His HGB and WBC are WN. He tolerates well. He has no h/o thrombosis or abnormal bleeding.  He is in good health and offers no complaints.   8/5/2024 Visit conducted via [employer].  Patient seen in follow up for JAK2 positive ET. He is on Hydrea 1000 mg daily (500 mg BID) and ASA, tolerating well. His counts are WNL (, HGB 15.4). He has no complaints.    -

## 2024-08-05 NOTE — ASSESSMENT
[FreeTextEntry1] : 73-year-old man with CAD s/p stents X 3, LV thrombus resolved, ischemic cardiomyopathy, aortic stenosis, hypertension, and hyperlipidemia here for continued evaluation and treatment of JAK2+ essential thrombocytosis.   He is currently on Hydrea 500 mg Tab 1 Tab BID.  He is tolerating the current Hydrea dose well without any ill side effects. In EMR, platelets have been running ~400K .  Hemoglobin has been running 15-16g/dL. Risks and benefits of increasing dose discussed with patient at length including the risk of further thrombotic events with an elevated platelet count.    [] Essential thrombocytosis, JAK2+ - Goal PLT count ~400,000 and Goal HGB >/= 12.5 - Platelet count with improvement noted 383K (today 8/5/24)  - Continue Hydrea 500 mg BID  -  Continue ASA 81 mg daily  - Pending CMP, LDH, uric acid today  - Pt did not obtain abd US yet - recommend again to obtain baseline abd US to assess spleen size  RTC in 6 months

## 2024-08-07 LAB
ALBUMIN SERPL ELPH-MCNC: 4.3 G/DL
ALP BLD-CCNC: 77 U/L
ALT SERPL-CCNC: 32 U/L
ANION GAP SERPL CALC-SCNC: 12 MMOL/L
AST SERPL-CCNC: 24 U/L
BILIRUB SERPL-MCNC: 0.6 MG/DL
BUN SERPL-MCNC: 17 MG/DL
CALCIUM SERPL-MCNC: 9.5 MG/DL
CHLORIDE SERPL-SCNC: 105 MMOL/L
CO2 SERPL-SCNC: 24 MMOL/L
CREAT SERPL-MCNC: 0.92 MG/DL
EGFR: 88 ML/MIN/1.73M2
FERRITIN SERPL-MCNC: 232 NG/ML
GLUCOSE SERPL-MCNC: 102 MG/DL
IRON SATN MFR SERPL: 32 %
IRON SERPL-MCNC: 86 UG/DL
LDH SERPL-CCNC: 255 U/L
POTASSIUM SERPL-SCNC: 4.7 MMOL/L
PROT SERPL-MCNC: 6.8 G/DL
SODIUM SERPL-SCNC: 141 MMOL/L
TIBC SERPL-MCNC: 274 UG/DL
UIBC SERPL-MCNC: 187 UG/DL
URATE SERPL-MCNC: 5.2 MG/DL

## 2024-08-27 ENCOUNTER — RX RENEWAL (OUTPATIENT)
Age: 74
End: 2024-08-27

## 2024-08-28 ENCOUNTER — APPOINTMENT (OUTPATIENT)
Dept: HEART AND VASCULAR | Facility: CLINIC | Age: 74
End: 2024-08-28
Payer: MEDICARE

## 2024-08-28 ENCOUNTER — NON-APPOINTMENT (OUTPATIENT)
Age: 74
End: 2024-08-28

## 2024-08-28 VITALS
HEIGHT: 69 IN | SYSTOLIC BLOOD PRESSURE: 128 MMHG | OXYGEN SATURATION: 97 % | DIASTOLIC BLOOD PRESSURE: 79 MMHG | BODY MASS INDEX: 26.51 KG/M2 | WEIGHT: 179 LBS | HEART RATE: 66 BPM | TEMPERATURE: 97.6 F

## 2024-08-28 DIAGNOSIS — I35.0 NONRHEUMATIC AORTIC (VALVE) STENOSIS: ICD-10-CM

## 2024-08-28 DIAGNOSIS — E78.5 HYPERLIPIDEMIA, UNSPECIFIED: ICD-10-CM

## 2024-08-28 DIAGNOSIS — I25.10 ATHEROSCLEROTIC HEART DISEASE OF NATIVE CORONARY ARTERY W/OUT ANGINA PECTORIS: ICD-10-CM

## 2024-08-28 DIAGNOSIS — I10 ESSENTIAL (PRIMARY) HYPERTENSION: ICD-10-CM

## 2024-08-28 DIAGNOSIS — I25.5 ISCHEMIC CARDIOMYOPATHY: ICD-10-CM

## 2024-08-28 PROCEDURE — 99215 OFFICE O/P EST HI 40 MIN: CPT

## 2024-08-28 PROCEDURE — G2211 COMPLEX E/M VISIT ADD ON: CPT

## 2024-08-28 PROCEDURE — 93000 ELECTROCARDIOGRAM COMPLETE: CPT

## 2024-08-28 NOTE — DISCUSSION/SUMMARY
[FreeTextEntry1] : stable exam CAD stable, no angina, secondary prevention AS stable, f/u with echo eval CMP- stable, ischemic, mild reduced LVEF, no active CHF HTN stabvle Lipids stable [EKG obtained to assist in diagnosis and management of assessed problem(s)] : EKG obtained to assist in diagnosis and management of assessed problem(s)

## 2024-10-09 ENCOUNTER — APPOINTMENT (OUTPATIENT)
Dept: HEART AND VASCULAR | Facility: CLINIC | Age: 74
End: 2024-10-09

## 2024-10-09 ENCOUNTER — APPOINTMENT (OUTPATIENT)
Dept: HEART AND VASCULAR | Facility: CLINIC | Age: 74
End: 2024-10-09
Payer: MEDICARE

## 2024-10-09 PROCEDURE — 93306 TTE W/DOPPLER COMPLETE: CPT

## 2024-12-17 ENCOUNTER — TRANSCRIPTION ENCOUNTER (OUTPATIENT)
Age: 74
End: 2024-12-17

## 2025-01-15 ENCOUNTER — LABORATORY RESULT (OUTPATIENT)
Age: 75
End: 2025-01-15

## 2025-01-15 ENCOUNTER — NON-APPOINTMENT (OUTPATIENT)
Age: 75
End: 2025-01-15

## 2025-01-15 ENCOUNTER — APPOINTMENT (OUTPATIENT)
Dept: HEART AND VASCULAR | Facility: CLINIC | Age: 75
End: 2025-01-15
Payer: MEDICARE

## 2025-01-15 VITALS
WEIGHT: 181 LBS | HEIGHT: 69 IN | BODY MASS INDEX: 26.81 KG/M2 | SYSTOLIC BLOOD PRESSURE: 140 MMHG | HEART RATE: 77 BPM | TEMPERATURE: 98.1 F | DIASTOLIC BLOOD PRESSURE: 74 MMHG | OXYGEN SATURATION: 98 %

## 2025-01-15 DIAGNOSIS — I10 ESSENTIAL (PRIMARY) HYPERTENSION: ICD-10-CM

## 2025-01-15 DIAGNOSIS — E78.5 HYPERLIPIDEMIA, UNSPECIFIED: ICD-10-CM

## 2025-01-15 DIAGNOSIS — I25.10 ATHEROSCLEROTIC HEART DISEASE OF NATIVE CORONARY ARTERY W/OUT ANGINA PECTORIS: ICD-10-CM

## 2025-01-15 DIAGNOSIS — I35.0 NONRHEUMATIC AORTIC (VALVE) STENOSIS: ICD-10-CM

## 2025-01-15 PROCEDURE — G2211 COMPLEX E/M VISIT ADD ON: CPT

## 2025-01-15 PROCEDURE — 99214 OFFICE O/P EST MOD 30 MIN: CPT

## 2025-01-15 PROCEDURE — 36415 COLL VENOUS BLD VENIPUNCTURE: CPT

## 2025-01-15 PROCEDURE — 93000 ELECTROCARDIOGRAM COMPLETE: CPT

## 2025-01-16 LAB
ESTIMATED AVERAGE GLUCOSE: 114 MG/DL
HBA1C MFR BLD HPLC: 5.6 %

## 2025-01-17 ENCOUNTER — NON-APPOINTMENT (OUTPATIENT)
Age: 75
End: 2025-01-17

## 2025-01-17 LAB
ALBUMIN SERPL ELPH-MCNC: 4.2 G/DL
ALP BLD-CCNC: 88 U/L
ALT SERPL-CCNC: 40 U/L
ANION GAP SERPL CALC-SCNC: 17 MMOL/L
AST SERPL-CCNC: 23 U/L
BASOPHILS # BLD AUTO: 0.04 K/UL
BASOPHILS NFR BLD AUTO: 0.6 %
BILIRUB SERPL-MCNC: 0.3 MG/DL
BUN SERPL-MCNC: 23 MG/DL
CALCIUM SERPL-MCNC: 9.5 MG/DL
CHLORIDE SERPL-SCNC: 105 MMOL/L
CHOLEST SERPL-MCNC: 115 MG/DL
CO2 SERPL-SCNC: 21 MMOL/L
CREAT SERPL-MCNC: 0.88 MG/DL
EGFR: 91 ML/MIN/1.73M2
EOSINOPHIL # BLD AUTO: 0.14 K/UL
EOSINOPHIL NFR BLD AUTO: 2 %
GLUCOSE SERPL-MCNC: 94 MG/DL
HCT VFR BLD CALC: 45.9 %
HDLC SERPL-MCNC: 35 MG/DL
HGB BLD-MCNC: 15 G/DL
IMM GRANULOCYTES NFR BLD AUTO: 0.3 %
LDLC SERPL CALC-MCNC: 55 MG/DL
LYMPHOCYTES # BLD AUTO: 1.37 K/UL
LYMPHOCYTES NFR BLD AUTO: 19.3 %
MAN DIFF?: NORMAL
MCHC RBC-ENTMCNC: 32.7 G/DL
MCHC RBC-ENTMCNC: 37.4 PG
MCV RBC AUTO: 114.5 FL
MONOCYTES # BLD AUTO: 0.62 K/UL
MONOCYTES NFR BLD AUTO: 8.7 %
NEUTROPHILS # BLD AUTO: 4.9 K/UL
NEUTROPHILS NFR BLD AUTO: 69.1 %
NONHDLC SERPL-MCNC: 80 MG/DL
PLATELET # BLD AUTO: 399 K/UL
POTASSIUM SERPL-SCNC: 4.2 MMOL/L
PROT SERPL-MCNC: 7 G/DL
PSA SERPL-MCNC: 2.69 NG/ML
RBC # BLD: 4.01 M/UL
RBC # FLD: 13.1 %
SODIUM SERPL-SCNC: 142 MMOL/L
TRIGL SERPL-MCNC: 144 MG/DL
TSH SERPL-ACNC: 1.13 UIU/ML
WBC # FLD AUTO: 7.09 K/UL

## 2025-01-28 DIAGNOSIS — D47.3 ESSENTIAL (HEMORRHAGIC) THROMBOCYTHEMIA: ICD-10-CM

## 2025-02-04 ENCOUNTER — OUTPATIENT (OUTPATIENT)
Dept: OUTPATIENT SERVICES | Facility: HOSPITAL | Age: 75
LOS: 1 days | Discharge: HOME | End: 2025-02-04

## 2025-02-04 DIAGNOSIS — D47.1 CHRONIC MYELOPROLIFERATIVE DISEASE: ICD-10-CM

## 2025-02-06 ENCOUNTER — APPOINTMENT (OUTPATIENT)
Dept: HEMATOLOGY ONCOLOGY | Facility: CLINIC | Age: 75
End: 2025-02-06

## 2025-04-02 ENCOUNTER — APPOINTMENT (OUTPATIENT)
Dept: HEMATOLOGY ONCOLOGY | Facility: CLINIC | Age: 75
End: 2025-04-02

## 2025-04-02 ENCOUNTER — APPOINTMENT (OUTPATIENT)
Dept: HEMATOLOGY ONCOLOGY | Facility: CLINIC | Age: 75
End: 2025-04-02
Payer: MEDICARE

## 2025-04-02 ENCOUNTER — RESULT REVIEW (OUTPATIENT)
Age: 75
End: 2025-04-02

## 2025-04-02 VITALS
WEIGHT: 181.88 LBS | SYSTOLIC BLOOD PRESSURE: 160 MMHG | TEMPERATURE: 98.6 F | HEART RATE: 67 BPM | OXYGEN SATURATION: 98 % | DIASTOLIC BLOOD PRESSURE: 89 MMHG | RESPIRATION RATE: 16 BRPM | BODY MASS INDEX: 26.86 KG/M2

## 2025-04-02 DIAGNOSIS — Z15.89 GENETIC SUSCEPTIBILITY TO OTHER DISEASE: ICD-10-CM

## 2025-04-02 DIAGNOSIS — Z79.01 LONG TERM (CURRENT) USE OF ANTICOAGULANTS: ICD-10-CM

## 2025-04-02 DIAGNOSIS — D47.3 ESSENTIAL (HEMORRHAGIC) THROMBOCYTHEMIA: ICD-10-CM

## 2025-04-02 LAB
BASOPHILS # BLD AUTO: 0.03 K/UL — SIGNIFICANT CHANGE UP (ref 0–0.2)
BASOPHILS NFR BLD AUTO: 0.5 % — SIGNIFICANT CHANGE UP (ref 0–2)
EOSINOPHIL # BLD AUTO: 0.1 K/UL — SIGNIFICANT CHANGE UP (ref 0–0.5)
EOSINOPHIL NFR BLD AUTO: 1.6 % — SIGNIFICANT CHANGE UP (ref 0–6)
HCT VFR BLD CALC: 43.6 % — SIGNIFICANT CHANGE UP (ref 39–50)
HGB BLD-MCNC: 15.1 G/DL — SIGNIFICANT CHANGE UP (ref 13–17)
IMM GRANULOCYTES NFR BLD AUTO: 0.2 % — SIGNIFICANT CHANGE UP (ref 0–0.9)
LYMPHOCYTES # BLD AUTO: 1.79 K/UL — SIGNIFICANT CHANGE UP (ref 1–3.3)
LYMPHOCYTES # BLD AUTO: 29.1 % — SIGNIFICANT CHANGE UP (ref 13–44)
MCHC RBC-ENTMCNC: 34.6 G/DL — SIGNIFICANT CHANGE UP (ref 32–36)
MCHC RBC-ENTMCNC: 38.2 PG — HIGH (ref 27–34)
MCV RBC AUTO: 110.4 FL — HIGH (ref 80–100)
MONOCYTES # BLD AUTO: 0.57 K/UL — SIGNIFICANT CHANGE UP (ref 0–0.9)
MONOCYTES NFR BLD AUTO: 9.3 % — SIGNIFICANT CHANGE UP (ref 2–14)
NEUTROPHILS # BLD AUTO: 3.66 K/UL — SIGNIFICANT CHANGE UP (ref 1.8–7.4)
NEUTROPHILS NFR BLD AUTO: 59.3 % — SIGNIFICANT CHANGE UP (ref 43–77)
NRBC BLD AUTO-RTO: 0 /100 WBCS — SIGNIFICANT CHANGE UP (ref 0–0)
PLATELET # BLD AUTO: 355 K/UL — SIGNIFICANT CHANGE UP (ref 150–400)
RBC # BLD: 3.95 M/UL — LOW (ref 4.2–5.8)
RBC # FLD: 13.3 % — SIGNIFICANT CHANGE UP (ref 10.3–14.5)
WBC # BLD: 6.16 K/UL — SIGNIFICANT CHANGE UP (ref 3.8–10.5)
WBC # FLD AUTO: 6.16 K/UL — SIGNIFICANT CHANGE UP (ref 3.8–10.5)

## 2025-04-02 PROCEDURE — 99213 OFFICE O/P EST LOW 20 MIN: CPT

## 2025-07-01 ENCOUNTER — RX RENEWAL (OUTPATIENT)
Age: 75
End: 2025-07-01

## 2025-08-19 ENCOUNTER — APPOINTMENT (OUTPATIENT)
Dept: HEART AND VASCULAR | Facility: CLINIC | Age: 75
End: 2025-08-19
Payer: MEDICARE

## 2025-08-19 VITALS
OXYGEN SATURATION: 99 % | BODY MASS INDEX: 26.66 KG/M2 | HEART RATE: 63 BPM | TEMPERATURE: 97.8 F | WEIGHT: 180 LBS | SYSTOLIC BLOOD PRESSURE: 140 MMHG | DIASTOLIC BLOOD PRESSURE: 80 MMHG | HEIGHT: 69 IN

## 2025-08-19 DIAGNOSIS — Z95.5 PRESENCE OF CORONARY ANGIOPLASTY IMPLANT AND GRAFT: ICD-10-CM

## 2025-08-19 DIAGNOSIS — I35.0 NONRHEUMATIC AORTIC (VALVE) STENOSIS: ICD-10-CM

## 2025-08-19 DIAGNOSIS — I23.6 THROMBOSIS OF ATRIUM, AURICULAR APPENDAGE, AND VENTRICLE AS CURRENT COMPLICATIONS FOLLOWING ACUTE MYOCARDIAL INFARCTION: ICD-10-CM

## 2025-08-19 DIAGNOSIS — Z01.818 ENCOUNTER FOR OTHER PREPROCEDURAL EXAMINATION: ICD-10-CM

## 2025-08-19 DIAGNOSIS — E78.5 HYPERLIPIDEMIA, UNSPECIFIED: ICD-10-CM

## 2025-08-19 PROCEDURE — 93000 ELECTROCARDIOGRAM COMPLETE: CPT

## 2025-08-19 PROCEDURE — 99214 OFFICE O/P EST MOD 30 MIN: CPT | Mod: 25

## 2025-08-21 PROBLEM — Z01.818 PRE-OP EVALUATION: Status: ACTIVE | Noted: 2025-08-21

## 2025-09-03 ENCOUNTER — RESULT REVIEW (OUTPATIENT)
Age: 75
End: 2025-09-03

## 2025-09-03 ENCOUNTER — APPOINTMENT (OUTPATIENT)
Dept: HEMATOLOGY ONCOLOGY | Facility: CLINIC | Age: 75
End: 2025-09-03
Payer: MEDICARE

## 2025-09-03 VITALS
RESPIRATION RATE: 16 BRPM | HEART RATE: 57 BPM | SYSTOLIC BLOOD PRESSURE: 132 MMHG | OXYGEN SATURATION: 95 % | TEMPERATURE: 97.2 F | WEIGHT: 182.54 LBS | BODY MASS INDEX: 26.96 KG/M2 | DIASTOLIC BLOOD PRESSURE: 68 MMHG

## 2025-09-03 DIAGNOSIS — D47.3 ESSENTIAL (HEMORRHAGIC) THROMBOCYTHEMIA: ICD-10-CM

## 2025-09-03 DIAGNOSIS — Z15.89 GENETIC SUSCEPTIBILITY TO OTHER DISEASE: ICD-10-CM

## 2025-09-03 PROCEDURE — 99214 OFFICE O/P EST MOD 30 MIN: CPT

## 2025-09-03 PROCEDURE — G2211 COMPLEX E/M VISIT ADD ON: CPT

## 2025-09-05 LAB
ALBUMIN SERPL ELPH-MCNC: 4.2 G/DL
ALP BLD-CCNC: 72 U/L
ALT SERPL-CCNC: 49 U/L
ANION GAP SERPL CALC-SCNC: 16 MMOL/L
AST SERPL-CCNC: 34 U/L
BILIRUB SERPL-MCNC: 0.5 MG/DL
BUN SERPL-MCNC: 18 MG/DL
CALCIUM SERPL-MCNC: 9.1 MG/DL
CHLORIDE SERPL-SCNC: 103 MMOL/L
CO2 SERPL-SCNC: 22 MMOL/L
CREAT SERPL-MCNC: 0.87 MG/DL
EGFRCR SERPLBLD CKD-EPI 2021: 91 ML/MIN/1.73M2
GLUCOSE SERPL-MCNC: 89 MG/DL
LDH SERPL-CCNC: 230 U/L
POTASSIUM SERPL-SCNC: 3.9 MMOL/L
PROT SERPL-MCNC: 6.5 G/DL
SODIUM SERPL-SCNC: 141 MMOL/L
URATE SERPL-MCNC: 5.1 MG/DL